# Patient Record
Sex: MALE | Race: BLACK OR AFRICAN AMERICAN | NOT HISPANIC OR LATINO
[De-identification: names, ages, dates, MRNs, and addresses within clinical notes are randomized per-mention and may not be internally consistent; named-entity substitution may affect disease eponyms.]

---

## 2017-01-23 ENCOUNTER — APPOINTMENT (OUTPATIENT)
Dept: UROLOGY | Facility: CLINIC | Age: 72
End: 2017-01-23

## 2017-01-23 VITALS
DIASTOLIC BLOOD PRESSURE: 83 MMHG | OXYGEN SATURATION: 99 % | HEIGHT: 62 IN | BODY MASS INDEX: 34.04 KG/M2 | WEIGHT: 185 LBS | HEART RATE: 42 BPM | SYSTOLIC BLOOD PRESSURE: 137 MMHG | TEMPERATURE: 98.1 F

## 2017-01-31 ENCOUNTER — FORM ENCOUNTER (OUTPATIENT)
Age: 72
End: 2017-01-31

## 2017-02-01 ENCOUNTER — OUTPATIENT (OUTPATIENT)
Dept: OUTPATIENT SERVICES | Facility: HOSPITAL | Age: 72
LOS: 1 days | End: 2017-02-01
Payer: MEDICARE

## 2017-02-01 PROCEDURE — 71260 CT THORAX DX C+: CPT

## 2017-02-01 PROCEDURE — 71260 CT THORAX DX C+: CPT | Mod: 26

## 2017-02-22 ENCOUNTER — APPOINTMENT (OUTPATIENT)
Dept: UROLOGY | Facility: CLINIC | Age: 72
End: 2017-02-22

## 2017-02-22 VITALS
SYSTOLIC BLOOD PRESSURE: 144 MMHG | TEMPERATURE: 97.7 F | HEART RATE: 51 BPM | WEIGHT: 181 LBS | RESPIRATION RATE: 99 BRPM | DIASTOLIC BLOOD PRESSURE: 76 MMHG | BODY MASS INDEX: 23.23 KG/M2 | HEIGHT: 74 IN

## 2017-02-23 ENCOUNTER — OTHER (OUTPATIENT)
Age: 72
End: 2017-02-23

## 2017-02-23 LAB
ALBUMIN SERPL ELPH-MCNC: 4.4 G/DL
ALP BLD-CCNC: 127 U/L
ALT SERPL-CCNC: 61 U/L
ANION GAP SERPL CALC-SCNC: 14 MMOL/L
AST SERPL-CCNC: 64 U/L
BASOPHILS # BLD AUTO: 0.03 K/UL
BASOPHILS NFR BLD AUTO: 0.4 %
BILIRUB SERPL-MCNC: 0.4 MG/DL
BUN SERPL-MCNC: 14 MG/DL
CALCIUM SERPL-MCNC: 9.3 MG/DL
CHLORIDE SERPL-SCNC: 101 MMOL/L
CO2 SERPL-SCNC: 26 MMOL/L
CREAT SERPL-MCNC: 1.22 MG/DL
EOSINOPHIL # BLD AUTO: 0.13 K/UL
EOSINOPHIL NFR BLD AUTO: 1.9 %
GLUCOSE SERPL-MCNC: 100 MG/DL
HCT VFR BLD CALC: 39 %
HGB BLD-MCNC: 12.7 G/DL
IMM GRANULOCYTES NFR BLD AUTO: 0.3 %
LYMPHOCYTES # BLD AUTO: 1.64 K/UL
LYMPHOCYTES NFR BLD AUTO: 24.2 %
MAN DIFF?: NORMAL
MCHC RBC-ENTMCNC: 32.3 PG
MCHC RBC-ENTMCNC: 32.6 GM/DL
MCV RBC AUTO: 99.2 FL
MONOCYTES # BLD AUTO: 0.69 K/UL
MONOCYTES NFR BLD AUTO: 10.2 %
NEUTROPHILS # BLD AUTO: 4.26 K/UL
NEUTROPHILS NFR BLD AUTO: 63 %
PLATELET # BLD AUTO: 201 K/UL
POTASSIUM SERPL-SCNC: 5 MMOL/L
PROT SERPL-MCNC: 7.8 G/DL
PSA SERPL-MCNC: 28.64 NG/ML
RBC # BLD: 3.93 M/UL
RBC # FLD: 12.9 %
SODIUM SERPL-SCNC: 141 MMOL/L
TESTOST SERPL-MCNC: 372.9 NG/DL
WBC # FLD AUTO: 6.77 K/UL

## 2017-03-10 ENCOUNTER — OTHER (OUTPATIENT)
Age: 72
End: 2017-03-10

## 2017-03-16 ENCOUNTER — APPOINTMENT (OUTPATIENT)
Dept: THORACIC SURGERY | Facility: CLINIC | Age: 72
End: 2017-03-16

## 2017-03-16 VITALS
RESPIRATION RATE: 17 BRPM | HEIGHT: 74 IN | HEART RATE: 57 BPM | DIASTOLIC BLOOD PRESSURE: 60 MMHG | WEIGHT: 184 LBS | SYSTOLIC BLOOD PRESSURE: 113 MMHG | TEMPERATURE: 98 F | BODY MASS INDEX: 23.61 KG/M2 | OXYGEN SATURATION: 99 %

## 2017-03-16 DIAGNOSIS — R06.02 SHORTNESS OF BREATH: ICD-10-CM

## 2017-03-16 DIAGNOSIS — Z87.09 PERSONAL HISTORY OF OTHER DISEASES OF THE RESPIRATORY SYSTEM: ICD-10-CM

## 2017-03-16 DIAGNOSIS — Z87.891 PERSONAL HISTORY OF NICOTINE DEPENDENCE: ICD-10-CM

## 2017-03-16 DIAGNOSIS — Z87.81 PERSONAL HISTORY OF (HEALED) TRAUMATIC FRACTURE: ICD-10-CM

## 2017-03-16 DIAGNOSIS — Z80.9 FAMILY HISTORY OF MALIGNANT NEOPLASM, UNSPECIFIED: ICD-10-CM

## 2017-03-16 DIAGNOSIS — Z86.79 PERSONAL HISTORY OF OTHER DISEASES OF THE CIRCULATORY SYSTEM: ICD-10-CM

## 2017-03-16 DIAGNOSIS — R51 HEADACHE: ICD-10-CM

## 2017-03-16 DIAGNOSIS — R40.20 UNSPECIFIED COMA: ICD-10-CM

## 2017-04-03 DIAGNOSIS — I77.819 AORTIC ECTASIA, UNSPECIFIED SITE: ICD-10-CM

## 2017-04-05 ENCOUNTER — APPOINTMENT (OUTPATIENT)
Dept: THORACIC SURGERY | Facility: CLINIC | Age: 72
End: 2017-04-05

## 2017-04-05 ENCOUNTER — APPOINTMENT (OUTPATIENT)
Dept: CARDIOTHORACIC SURGERY | Facility: CLINIC | Age: 72
End: 2017-04-05

## 2017-04-05 ENCOUNTER — OUTPATIENT (OUTPATIENT)
Dept: OUTPATIENT SERVICES | Facility: HOSPITAL | Age: 72
LOS: 1 days | End: 2017-04-05
Payer: MEDICARE

## 2017-04-05 VITALS
HEART RATE: 70 BPM | OXYGEN SATURATION: 99 % | TEMPERATURE: 97.5 F | DIASTOLIC BLOOD PRESSURE: 71 MMHG | BODY MASS INDEX: 24.14 KG/M2 | SYSTOLIC BLOOD PRESSURE: 139 MMHG | RESPIRATION RATE: 18 BRPM | WEIGHT: 188 LBS

## 2017-04-05 VITALS — BODY MASS INDEX: 24.14 KG/M2 | HEIGHT: 74 IN

## 2017-04-05 DIAGNOSIS — R59.9 ENLARGED LYMPH NODES, UNSPECIFIED: ICD-10-CM

## 2017-04-05 LAB
ALBUMIN SERPL ELPH-MCNC: 4.1 G/DL — SIGNIFICANT CHANGE UP (ref 3.4–5)
ALP SERPL-CCNC: 175 U/L — HIGH (ref 40–120)
ALT FLD-CCNC: 56 U/L — HIGH (ref 12–42)
ANION GAP SERPL CALC-SCNC: 4 MMOL/L — LOW (ref 9–16)
APTT BLD: 29.3 SEC — SIGNIFICANT CHANGE UP (ref 27.5–37.4)
AST SERPL-CCNC: 45 U/L — HIGH (ref 15–37)
BASOPHILS NFR BLD AUTO: 0.4 % — SIGNIFICANT CHANGE UP (ref 0–2)
BILIRUB SERPL-MCNC: 0.6 MG/DL — SIGNIFICANT CHANGE UP (ref 0.2–1.2)
BUN SERPL-MCNC: 15 MG/DL — SIGNIFICANT CHANGE UP (ref 7–23)
CALCIUM SERPL-MCNC: 9 MG/DL — SIGNIFICANT CHANGE UP (ref 8.5–10.5)
CHLORIDE SERPL-SCNC: 103 MMOL/L — SIGNIFICANT CHANGE UP (ref 96–108)
CO2 SERPL-SCNC: 33 MMOL/L — HIGH (ref 22–31)
CREAT SERPL-MCNC: 1.07 MG/DL — SIGNIFICANT CHANGE UP (ref 0.5–1.3)
EOSINOPHIL NFR BLD AUTO: 2.3 % — SIGNIFICANT CHANGE UP (ref 0–6)
GLUCOSE SERPL-MCNC: 106 MG/DL — HIGH (ref 70–99)
HCT VFR BLD CALC: 36.9 % — LOW (ref 39–50)
HGB BLD-MCNC: 12.8 G/DL — LOW (ref 13–17)
INR BLD: 1.1 — SIGNIFICANT CHANGE UP (ref 0.88–1.16)
LYMPHOCYTES # BLD AUTO: 26.7 % — SIGNIFICANT CHANGE UP (ref 13–44)
MCHC RBC-ENTMCNC: 32.8 PG — SIGNIFICANT CHANGE UP (ref 27–34)
MCHC RBC-ENTMCNC: 34.7 G/DL — SIGNIFICANT CHANGE UP (ref 32–36)
MCV RBC AUTO: 94.6 FL — SIGNIFICANT CHANGE UP (ref 80–100)
MONOCYTES NFR BLD AUTO: 9.2 % — SIGNIFICANT CHANGE UP (ref 2–14)
NEUTROPHILS NFR BLD AUTO: 61.4 % — SIGNIFICANT CHANGE UP (ref 43–77)
PLATELET # BLD AUTO: 188 K/UL — SIGNIFICANT CHANGE UP (ref 150–400)
POTASSIUM SERPL-MCNC: 4.8 MMOL/L — SIGNIFICANT CHANGE UP (ref 3.5–5.3)
POTASSIUM SERPL-SCNC: 4.8 MMOL/L — SIGNIFICANT CHANGE UP (ref 3.5–5.3)
PROT SERPL-MCNC: 8.2 G/DL — SIGNIFICANT CHANGE UP (ref 6.4–8.2)
PROTHROM AB SERPL-ACNC: 12.2 SEC — SIGNIFICANT CHANGE UP (ref 9.8–12.7)
RBC # BLD: 3.9 M/UL — LOW (ref 4.2–5.8)
RBC # FLD: 12.6 % — SIGNIFICANT CHANGE UP (ref 10.3–16.9)
SODIUM SERPL-SCNC: 140 MMOL/L — SIGNIFICANT CHANGE UP (ref 135–145)
WBC # BLD: 7.5 K/UL — SIGNIFICANT CHANGE UP (ref 3.8–10.5)
WBC # FLD AUTO: 7.5 K/UL — SIGNIFICANT CHANGE UP (ref 3.8–10.5)

## 2017-04-05 PROCEDURE — 85730 THROMBOPLASTIN TIME PARTIAL: CPT

## 2017-04-05 PROCEDURE — 80053 COMPREHEN METABOLIC PANEL: CPT

## 2017-04-05 PROCEDURE — 85025 COMPLETE CBC W/AUTO DIFF WBC: CPT

## 2017-04-05 PROCEDURE — 85610 PROTHROMBIN TIME: CPT

## 2017-04-05 RX ORDER — LISINOPRIL 10 MG/1
10 TABLET ORAL
Qty: 30 | Refills: 0 | Status: COMPLETED | COMMUNITY
Start: 2016-10-24

## 2017-04-11 ENCOUNTER — RESULT REVIEW (OUTPATIENT)
Age: 72
End: 2017-04-11

## 2017-04-12 ENCOUNTER — OUTPATIENT (OUTPATIENT)
Dept: OUTPATIENT SERVICES | Facility: HOSPITAL | Age: 72
LOS: 1 days | End: 2017-04-12
Payer: MEDICARE

## 2017-04-12 PROCEDURE — 10022: CPT

## 2017-04-12 PROCEDURE — 76942 ECHO GUIDE FOR BIOPSY: CPT | Mod: 26

## 2017-04-12 PROCEDURE — 76942 ECHO GUIDE FOR BIOPSY: CPT

## 2017-04-12 PROCEDURE — 88173 CYTOPATH EVAL FNA REPORT: CPT

## 2017-04-12 PROCEDURE — 88341 IMHCHEM/IMCYTCHM EA ADD ANTB: CPT

## 2017-04-12 PROCEDURE — 88305 TISSUE EXAM BY PATHOLOGIST: CPT

## 2017-04-13 LAB — NON-GYNECOLOGICAL CYTOLOGY STUDY: SIGNIFICANT CHANGE UP

## 2017-04-20 ENCOUNTER — RESULT CHARGE (OUTPATIENT)
Age: 72
End: 2017-04-20

## 2017-04-21 ENCOUNTER — OTHER (OUTPATIENT)
Age: 72
End: 2017-04-21

## 2017-04-21 ENCOUNTER — APPOINTMENT (OUTPATIENT)
Dept: THORACIC SURGERY | Facility: CLINIC | Age: 72
End: 2017-04-21

## 2017-04-27 ENCOUNTER — APPOINTMENT (OUTPATIENT)
Dept: PULMONOLOGY | Facility: CLINIC | Age: 72
End: 2017-04-27

## 2017-04-27 VITALS
SYSTOLIC BLOOD PRESSURE: 90 MMHG | WEIGHT: 188 LBS | HEART RATE: 54 BPM | OXYGEN SATURATION: 98 % | TEMPERATURE: 97.6 F | BODY MASS INDEX: 24.13 KG/M2 | DIASTOLIC BLOOD PRESSURE: 60 MMHG | HEIGHT: 74 IN

## 2017-04-27 DIAGNOSIS — R59.9 ENLARGED LYMPH NODES, UNSPECIFIED: ICD-10-CM

## 2017-04-28 ENCOUNTER — OTHER (OUTPATIENT)
Age: 72
End: 2017-04-28

## 2017-04-28 LAB
ANION GAP SERPL CALC-SCNC: 15 MMOL/L
BASOPHILS # BLD AUTO: 0.02 K/UL
BASOPHILS NFR BLD AUTO: 0.3 %
BUN SERPL-MCNC: 23 MG/DL
CALCIUM SERPL-MCNC: 9.2 MG/DL
CHLORIDE SERPL-SCNC: 99 MMOL/L
CO2 SERPL-SCNC: 25 MMOL/L
CREAT SERPL-MCNC: 1.28 MG/DL
EOSINOPHIL # BLD AUTO: 0.11 K/UL
EOSINOPHIL NFR BLD AUTO: 1.7 %
GLUCOSE SERPL-MCNC: 96 MG/DL
HCT VFR BLD CALC: 38.1 %
HGB BLD-MCNC: 12 G/DL
IMM GRANULOCYTES NFR BLD AUTO: 0.3 %
LYMPHOCYTES # BLD AUTO: 1.41 K/UL
LYMPHOCYTES NFR BLD AUTO: 21.9 %
MAN DIFF?: NORMAL
MCHC RBC-ENTMCNC: 31.5 GM/DL
MCHC RBC-ENTMCNC: 31.6 PG
MCV RBC AUTO: 100.3 FL
MONOCYTES # BLD AUTO: 0.42 K/UL
MONOCYTES NFR BLD AUTO: 6.5 %
NEUTROPHILS # BLD AUTO: 4.45 K/UL
NEUTROPHILS NFR BLD AUTO: 69.3 %
PLATELET # BLD AUTO: 201 K/UL
POTASSIUM SERPL-SCNC: 4 MMOL/L
RBC # BLD: 3.8 M/UL
RBC # FLD: 13.7 %
SODIUM SERPL-SCNC: 139 MMOL/L
WBC # FLD AUTO: 6.43 K/UL

## 2017-05-10 ENCOUNTER — OUTPATIENT (OUTPATIENT)
Dept: OUTPATIENT SERVICES | Facility: HOSPITAL | Age: 72
LOS: 1 days | End: 2017-05-10
Payer: MEDICARE

## 2017-05-10 DIAGNOSIS — I71.9 AORTIC ANEURYSM OF UNSPECIFIED SITE, WITHOUT RUPTURE: ICD-10-CM

## 2017-05-10 PROCEDURE — 93306 TTE W/DOPPLER COMPLETE: CPT | Mod: 26

## 2017-05-10 PROCEDURE — 93306 TTE W/DOPPLER COMPLETE: CPT

## 2017-05-16 ENCOUNTER — APPOINTMENT (OUTPATIENT)
Dept: PULMONOLOGY | Facility: HOSPITAL | Age: 72
End: 2017-05-16

## 2017-05-17 ENCOUNTER — APPOINTMENT (OUTPATIENT)
Dept: INTERNAL MEDICINE | Facility: CLINIC | Age: 72
End: 2017-05-17

## 2017-05-31 ENCOUNTER — OTHER (OUTPATIENT)
Age: 72
End: 2017-05-31

## 2017-06-05 ENCOUNTER — LABORATORY RESULT (OUTPATIENT)
Age: 72
End: 2017-06-05

## 2017-06-05 ENCOUNTER — APPOINTMENT (OUTPATIENT)
Dept: PULMONOLOGY | Facility: CLINIC | Age: 72
End: 2017-06-05

## 2017-06-05 VITALS
OXYGEN SATURATION: 99 % | HEART RATE: 62 BPM | DIASTOLIC BLOOD PRESSURE: 60 MMHG | BODY MASS INDEX: 23.61 KG/M2 | HEIGHT: 74 IN | SYSTOLIC BLOOD PRESSURE: 98 MMHG | TEMPERATURE: 97.9 F | WEIGHT: 184 LBS

## 2017-06-05 DIAGNOSIS — R91.1 SOLITARY PULMONARY NODULE: ICD-10-CM

## 2017-06-06 LAB
ANION GAP SERPL CALC-SCNC: 17 MMOL/L
BUN SERPL-MCNC: 16 MG/DL
CALCIUM SERPL-MCNC: 9.9 MG/DL
CHLORIDE SERPL-SCNC: 98 MMOL/L
CO2 SERPL-SCNC: 26 MMOL/L
CREAT SERPL-MCNC: 1.29 MG/DL
GLUCOSE SERPL-MCNC: 99 MG/DL
POTASSIUM SERPL-SCNC: 4.8 MMOL/L
SODIUM SERPL-SCNC: 141 MMOL/L

## 2017-06-07 ENCOUNTER — APPOINTMENT (OUTPATIENT)
Dept: UROLOGY | Facility: CLINIC | Age: 72
End: 2017-06-07

## 2017-06-07 VITALS
TEMPERATURE: 98.6 F | DIASTOLIC BLOOD PRESSURE: 71 MMHG | SYSTOLIC BLOOD PRESSURE: 104 MMHG | HEIGHT: 74 IN | HEART RATE: 77 BPM | WEIGHT: 184 LBS | BODY MASS INDEX: 23.61 KG/M2

## 2017-06-08 LAB
ALBUMIN SERPL ELPH-MCNC: 4.4 G/DL
ALP BLD-CCNC: 176 U/L
ALT SERPL-CCNC: 67 U/L
ANION GAP SERPL CALC-SCNC: 18 MMOL/L
AST SERPL-CCNC: 78 U/L
BILIRUB SERPL-MCNC: 0.3 MG/DL
BUN SERPL-MCNC: 13 MG/DL
CALCIUM SERPL-MCNC: 9.5 MG/DL
CHLORIDE SERPL-SCNC: 99 MMOL/L
CO2 SERPL-SCNC: 24 MMOL/L
CREAT SERPL-MCNC: 1.29 MG/DL
GLUCOSE SERPL-MCNC: 126 MG/DL
POTASSIUM SERPL-SCNC: 5 MMOL/L
PROT SERPL-MCNC: 8 G/DL
PSA SERPL-MCNC: 40.05 NG/ML
SODIUM SERPL-SCNC: 141 MMOL/L

## 2017-06-09 ENCOUNTER — RX RENEWAL (OUTPATIENT)
Age: 72
End: 2017-06-09

## 2017-06-09 LAB — TESTOST SERPL-MCNC: 532.6 NG/DL

## 2017-06-13 ENCOUNTER — APPOINTMENT (OUTPATIENT)
Dept: PULMONOLOGY | Facility: HOSPITAL | Age: 72
End: 2017-06-13

## 2017-06-15 ENCOUNTER — FORM ENCOUNTER (OUTPATIENT)
Age: 72
End: 2017-06-15

## 2017-06-16 ENCOUNTER — OUTPATIENT (OUTPATIENT)
Dept: OUTPATIENT SERVICES | Facility: HOSPITAL | Age: 72
LOS: 1 days | End: 2017-06-16
Payer: MEDICARE

## 2017-06-16 PROCEDURE — 71250 CT THORAX DX C-: CPT

## 2017-06-16 PROCEDURE — 71250 CT THORAX DX C-: CPT | Mod: 26

## 2017-06-20 ENCOUNTER — FORM ENCOUNTER (OUTPATIENT)
Age: 72
End: 2017-06-20

## 2017-06-21 ENCOUNTER — OUTPATIENT (OUTPATIENT)
Dept: OUTPATIENT SERVICES | Facility: HOSPITAL | Age: 72
LOS: 1 days | End: 2017-06-21
Payer: MEDICARE

## 2017-06-21 PROCEDURE — 71260 CT THORAX DX C+: CPT

## 2017-06-21 PROCEDURE — 71260 CT THORAX DX C+: CPT | Mod: 26

## 2017-06-21 PROCEDURE — 74177 CT ABD & PELVIS W/CONTRAST: CPT

## 2017-06-21 PROCEDURE — 74177 CT ABD & PELVIS W/CONTRAST: CPT | Mod: 26

## 2017-06-22 ENCOUNTER — FORM ENCOUNTER (OUTPATIENT)
Age: 72
End: 2017-06-22

## 2017-06-23 ENCOUNTER — RESULT REVIEW (OUTPATIENT)
Age: 72
End: 2017-06-23

## 2017-06-23 ENCOUNTER — APPOINTMENT (OUTPATIENT)
Dept: PULMONOLOGY | Facility: HOSPITAL | Age: 72
End: 2017-06-23

## 2017-06-23 ENCOUNTER — OUTPATIENT (OUTPATIENT)
Dept: OUTPATIENT SERVICES | Facility: HOSPITAL | Age: 72
LOS: 1 days | Discharge: HOME | End: 2017-06-23
Payer: MEDICARE

## 2017-06-23 PROCEDURE — 88173 CYTOPATH EVAL FNA REPORT: CPT

## 2017-06-23 PROCEDURE — 87305 ASPERGILLUS AG IA: CPT

## 2017-06-23 PROCEDURE — 31652 BRONCH EBUS SAMPLNG 1/2 NODE: CPT

## 2017-06-23 PROCEDURE — 31622 DX BRONCHOSCOPE/WASH: CPT | Mod: 59

## 2017-06-23 PROCEDURE — 87070 CULTURE OTHR SPECIMN AEROBIC: CPT

## 2017-06-23 PROCEDURE — 87102 FUNGUS ISOLATION CULTURE: CPT

## 2017-06-23 PROCEDURE — 31623 DX BRONCHOSCOPE/BRUSH: CPT

## 2017-06-23 PROCEDURE — 87449 NOS EACH ORGANISM AG IA: CPT

## 2017-06-23 PROCEDURE — 71045 X-RAY EXAM CHEST 1 VIEW: CPT

## 2017-06-23 PROCEDURE — 88342 IMHCHEM/IMCYTCHM 1ST ANTB: CPT

## 2017-06-23 PROCEDURE — 71010: CPT | Mod: 26

## 2017-06-23 PROCEDURE — 87206 SMEAR FLUORESCENT/ACID STAI: CPT

## 2017-06-23 PROCEDURE — 31624 DX BRONCHOSCOPE/LAVAGE: CPT

## 2017-06-23 PROCEDURE — 31627 NAVIGATIONAL BRONCHOSCOPY: CPT

## 2017-06-23 PROCEDURE — 31628 BRONCHOSCOPY/LUNG BX EACH: CPT

## 2017-06-23 PROCEDURE — 88305 TISSUE EXAM BY PATHOLOGIST: CPT

## 2017-06-23 PROCEDURE — 31654 BRONCH EBUS IVNTJ PERPH LES: CPT

## 2017-06-23 PROCEDURE — 87015 SPECIMEN INFECT AGNT CONCNTJ: CPT

## 2017-06-23 PROCEDURE — 88341 IMHCHEM/IMCYTCHM EA ADD ANTB: CPT

## 2017-06-23 PROCEDURE — 87116 MYCOBACTERIA CULTURE: CPT

## 2017-06-24 LAB
GRAM STN FLD: SIGNIFICANT CHANGE UP
NIGHT BLUE STAIN TISS: SIGNIFICANT CHANGE UP
SPECIMEN SOURCE: SIGNIFICANT CHANGE UP
SPECIMEN SOURCE: SIGNIFICANT CHANGE UP

## 2017-06-26 LAB
CULTURE RESULTS: SIGNIFICANT CHANGE UP
GALACTOMANNAN AG SPEC IA-ACNC: <0.5 INDEX — SIGNIFICANT CHANGE UP
NON-GYNECOLOGICAL CYTOLOGY STUDY: SIGNIFICANT CHANGE UP
NON-GYNECOLOGICAL CYTOLOGY STUDY: SIGNIFICANT CHANGE UP
SPECIMEN SOURCE: SIGNIFICANT CHANGE UP

## 2017-06-28 ENCOUNTER — APPOINTMENT (OUTPATIENT)
Dept: UROLOGY | Facility: CLINIC | Age: 72
End: 2017-06-28

## 2017-06-28 VITALS
HEIGHT: 74 IN | WEIGHT: 184 LBS | DIASTOLIC BLOOD PRESSURE: 75 MMHG | BODY MASS INDEX: 23.61 KG/M2 | SYSTOLIC BLOOD PRESSURE: 117 MMHG | TEMPERATURE: 98.4 F | HEART RATE: 60 BPM

## 2017-06-28 DIAGNOSIS — Z85.46 PERSONAL HISTORY OF MALIGNANT NEOPLASM OF PROSTATE: ICD-10-CM

## 2017-06-28 DIAGNOSIS — R91.1 SOLITARY PULMONARY NODULE: ICD-10-CM

## 2017-06-28 DIAGNOSIS — C77.1 SECONDARY AND UNSPECIFIED MALIGNANT NEOPLASM OF INTRATHORACIC LYMPH NODES: ICD-10-CM

## 2017-06-29 ENCOUNTER — OTHER (OUTPATIENT)
Age: 72
End: 2017-06-29

## 2017-06-29 LAB
FUNGITELL B-D-GLUCAN,  BRONCHIAL WASH: SIGNIFICANT CHANGE UP
PSA SERPL-MCNC: 5.03 NG/ML
SURGICAL PATHOLOGY STUDY: SIGNIFICANT CHANGE UP
TESTOST SERPL-MCNC: 477 NG/DL

## 2017-07-05 ENCOUNTER — APPOINTMENT (OUTPATIENT)
Dept: PULMONOLOGY | Facility: CLINIC | Age: 72
End: 2017-07-05

## 2017-07-05 ENCOUNTER — APPOINTMENT (OUTPATIENT)
Dept: PULMONOLOGY | Facility: HOSPITAL | Age: 72
End: 2017-07-05

## 2017-07-12 ENCOUNTER — APPOINTMENT (OUTPATIENT)
Dept: CARDIOTHORACIC SURGERY | Facility: CLINIC | Age: 72
End: 2017-07-12

## 2017-07-12 VITALS
TEMPERATURE: 97.7 F | BODY MASS INDEX: 24.39 KG/M2 | HEART RATE: 57 BPM | SYSTOLIC BLOOD PRESSURE: 115 MMHG | OXYGEN SATURATION: 99 % | DIASTOLIC BLOOD PRESSURE: 67 MMHG | RESPIRATION RATE: 17 BRPM | WEIGHT: 190 LBS

## 2017-07-12 VITALS
DIASTOLIC BLOOD PRESSURE: 67 MMHG | TEMPERATURE: 97.7 F | SYSTOLIC BLOOD PRESSURE: 115 MMHG | RESPIRATION RATE: 17 BRPM | HEIGHT: 74 IN | OXYGEN SATURATION: 99 % | WEIGHT: 190 LBS | HEART RATE: 57 BPM | BODY MASS INDEX: 24.38 KG/M2

## 2017-07-14 VITALS
SYSTOLIC BLOOD PRESSURE: 96 MMHG | RESPIRATION RATE: 17 BRPM | DIASTOLIC BLOOD PRESSURE: 54 MMHG | TEMPERATURE: 96 F | OXYGEN SATURATION: 99 % | HEART RATE: 46 BPM

## 2017-07-14 RX ORDER — CHLORHEXIDINE GLUCONATE 213 G/1000ML
1 SOLUTION TOPICAL ONCE
Qty: 0 | Refills: 0 | Status: DISCONTINUED | OUTPATIENT
Start: 2017-07-17 | End: 2017-07-17

## 2017-07-14 NOTE — H&P ADULT - ASSESSMENT
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 Recent repeat CT scan (6/21/2017) showed aortic root measuring 5.6x 5.2cm with aneurysmal dilatation of ascending aorta. Patient admits to SOB when walking too fast up a hill, but used to run for miles before he found out he had a problem with his aorta. 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 Recent repeat CT scan (6/21/2017) showed aortic root measuring 5.6x 5.2cm with aneurysmal dilatation of ascending aorta. Patient admits to SOB when walking too fast up a hill, but used to run for miles before he found out he had a problem with his aorta. In light of patient's risk factors, CT findings, and echocardiogram findings, patient is referred for right and left heart catheterization to r/o CAD in preparation for upcoming surgery. Patient does not yet have a scheduled surgery date.     Patient took ASA 81 mg this AM prior to arrival. As per Dr. Baldwin, patient loaded with  mg and Plavix 600 mg prior to procedure.     ASA III and Mallampati III  Risks & benefits of procedure and alternative therapy have been explained to the patient including but not limited to: allergic reaction, bleeding w/possible need for blood transfusion, infection, renal and vascular compromise, limb damage, arrhythmia, stroke, vessel dissection/perforation, Myocardial infarction, emergent CABG. Informed consent obtained and in chart.

## 2017-07-14 NOTE — H&P ADULT - NSHPSOCIALHISTORY_GEN_ALL_CORE
ETOH-used to occasionally drink, TOBACCO-quit 2006 used to smoke on and off since college, DRUGS-cocaine/marjuana in univeristy, but none recently. ETOH-used to occasionally drink, TOBACCO-quit 2006 used to smoke on and off since college, DRUGS-cocaine/marjuana in university, but none recently.

## 2017-07-14 NOTE — H&P ADULT - HISTORY OF PRESENT ILLNESS
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 Recent repeat CT scan (6/21/2017) showed aortic root measuring 5.6x 5.2cm with aneurysmal dilatation of ascending aorta.       Of note, PET CT scan revealed multiple FDG avid lymph nodes and the left supraclavicular node was biopsied. Pt follows with Dr Avalos (thoracic surgeon).     Echo (5/10/17): EF 40%, moderate concentric LVH, mild global hypokinesis of LV, impaired left ventricular relaxation with mildly elevated LA pressure, mild-moderate AR, PA pressure 25-30mmHg, dilated aortic root. 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 Recent repeat CT scan (6/21/2017) showed aortic root measuring 5.6x 5.2cm with aneurysmal dilatation of ascending aorta. Patient admits to SOB when walking too fast up a hill, but used to run for miles before he found out he had a problem with his aorta.     Echo (5/10/17): EF 40%, moderate concentric LVH, mild global hypokinesis of LV, impaired left ventricular relaxation with mildly elevated LA pressure, mild-moderate AR, PA pressure 25-30mmHg, dilated aortic root.      Of note, PET CT scan revealed multiple FDG avid lymph nodes and the left supraclavicular node was biopsied. Pt follows with Dr Avalos (thoracic surgeon). 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 Recent repeat CT scan (6/21/2017) showed aortic root measuring 5.6x 5.2cm with aneurysmal dilatation of ascending aorta. Patient admits to SOB when walking too fast up a hill, but used to run for miles before he found out he had a problem with his aorta.     Echo (5/10/17): EF 40%, moderate concentric LVH, mild global hypokinesis of LV, impaired left ventricular relaxation with mildly elevated LA pressure, mild-moderate AR, PA pressure 25-30mmHg, dilated aortic root.      Of note, PET CT scan revealed multiple FDG avid lymph nodes and the left supraclavicular node was biopsied. Pt follows with Dr Avalos (thoracic surgeon). 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 Recent repeat CT scan (6/21/2017) showed aortic root measuring 5.6x 5.2cm with aneurysmal dilatation of ascending aorta. Patient admits to SOB when walking too fast up a hill, but used to run for miles before he found out he had a problem with his aorta.     Echo (5/10/17): EF 40%, moderate concentric LVH, mild global hypokinesis of LV, impaired left ventricular relaxation with mildly elevated LA pressure, mild-moderate AR, PA pressure 25-30mmHg, dilated aortic root.      Of note, PET CT scan revealed multiple FDG avid lymph nodes and the left supraclavicular node was biopsied. Pt follows with Dr Avalos (thoracic surgeon). Follows with his Urologist for his cancer who referred him to an oncologist at [facility] who did not accept his insurance, currently looking for an oncologist

## 2017-07-16 ENCOUNTER — FORM ENCOUNTER (OUTPATIENT)
Age: 72
End: 2017-07-16

## 2017-07-17 ENCOUNTER — OUTPATIENT (OUTPATIENT)
Dept: OUTPATIENT SERVICES | Facility: HOSPITAL | Age: 72
LOS: 1 days | Discharge: NURSING, REHAB OR LONG-TERM CARE | End: 2017-07-17
Payer: MEDICARE

## 2017-07-17 DIAGNOSIS — S42.009A FRACTURE OF UNSPECIFIED PART OF UNSPECIFIED CLAVICLE, INITIAL ENCOUNTER FOR CLOSED FRACTURE: Chronic | ICD-10-CM

## 2017-07-17 LAB
ALBUMIN SERPL ELPH-MCNC: 4.1 G/DL — SIGNIFICANT CHANGE UP (ref 3.3–5)
ALP SERPL-CCNC: 151 U/L — HIGH (ref 40–120)
ALT FLD-CCNC: 38 U/L — SIGNIFICANT CHANGE UP (ref 10–45)
ANION GAP SERPL CALC-SCNC: 15 MMOL/L — SIGNIFICANT CHANGE UP (ref 5–17)
APPEARANCE UR: CLEAR — SIGNIFICANT CHANGE UP
APTT BLD: 26.7 SEC — LOW (ref 27.5–37.4)
AST SERPL-CCNC: 51 U/L — HIGH (ref 10–40)
BASOPHILS NFR BLD AUTO: 0.3 % — SIGNIFICANT CHANGE UP (ref 0–2)
BILIRUB SERPL-MCNC: 0.4 MG/DL — SIGNIFICANT CHANGE UP (ref 0.2–1.2)
BILIRUB UR-MCNC: NEGATIVE — SIGNIFICANT CHANGE UP
BLD GP AB SCN SERPL QL: NEGATIVE — SIGNIFICANT CHANGE UP
BUN SERPL-MCNC: 16 MG/DL — SIGNIFICANT CHANGE UP (ref 7–23)
CALCIUM SERPL-MCNC: 8.9 MG/DL — SIGNIFICANT CHANGE UP (ref 8.4–10.5)
CHLORIDE SERPL-SCNC: 103 MMOL/L — SIGNIFICANT CHANGE UP (ref 96–108)
CHOLEST SERPL-MCNC: 163 MG/DL — SIGNIFICANT CHANGE UP (ref 10–199)
CO2 SERPL-SCNC: 21 MMOL/L — LOW (ref 22–31)
COLOR SPEC: YELLOW — SIGNIFICANT CHANGE UP
CREAT SERPL-MCNC: 1.2 MG/DL — SIGNIFICANT CHANGE UP (ref 0.5–1.3)
DIFF PNL FLD: NEGATIVE — SIGNIFICANT CHANGE UP
EOSINOPHIL NFR BLD AUTO: 2.3 % — SIGNIFICANT CHANGE UP (ref 0–6)
GLUCOSE SERPL-MCNC: 136 MG/DL — HIGH (ref 70–99)
GLUCOSE UR QL: NEGATIVE — SIGNIFICANT CHANGE UP
HBA1C BLD-MCNC: 5.7 % — HIGH (ref 4–5.6)
HCT VFR BLD CALC: 37.5 % — LOW (ref 39–50)
HDLC SERPL-MCNC: 78 MG/DL — SIGNIFICANT CHANGE UP (ref 40–125)
HGB BLD-MCNC: 13.2 G/DL — SIGNIFICANT CHANGE UP (ref 13–17)
INR BLD: 1.06 — SIGNIFICANT CHANGE UP (ref 0.88–1.16)
KETONES UR-MCNC: NEGATIVE — SIGNIFICANT CHANGE UP
LEUKOCYTE ESTERASE UR-ACNC: NEGATIVE — SIGNIFICANT CHANGE UP
LIPID PNL WITH DIRECT LDL SERPL: 68 MG/DL — SIGNIFICANT CHANGE UP
LYMPHOCYTES # BLD AUTO: 27.9 % — SIGNIFICANT CHANGE UP (ref 13–44)
MCHC RBC-ENTMCNC: 32.7 PG — SIGNIFICANT CHANGE UP (ref 27–34)
MCHC RBC-ENTMCNC: 35.2 G/DL — SIGNIFICANT CHANGE UP (ref 32–36)
MCV RBC AUTO: 92.8 FL — SIGNIFICANT CHANGE UP (ref 80–100)
MONOCYTES NFR BLD AUTO: 6.8 % — SIGNIFICANT CHANGE UP (ref 2–14)
NEUTROPHILS NFR BLD AUTO: 62.7 % — SIGNIFICANT CHANGE UP (ref 43–77)
NITRITE UR-MCNC: NEGATIVE — SIGNIFICANT CHANGE UP
PH UR: 6 — SIGNIFICANT CHANGE UP (ref 5–8)
PLATELET # BLD AUTO: 185 K/UL — SIGNIFICANT CHANGE UP (ref 150–400)
POTASSIUM SERPL-MCNC: 3.9 MMOL/L — SIGNIFICANT CHANGE UP (ref 3.5–5.3)
POTASSIUM SERPL-SCNC: 3.9 MMOL/L — SIGNIFICANT CHANGE UP (ref 3.5–5.3)
PROT SERPL-MCNC: 8.2 G/DL — SIGNIFICANT CHANGE UP (ref 6–8.3)
PROT UR-MCNC: (no result) MG/DL
PROTHROM AB SERPL-ACNC: 11.8 SEC — SIGNIFICANT CHANGE UP (ref 9.8–12.7)
RBC # BLD: 4.04 M/UL — LOW (ref 4.2–5.8)
RBC # FLD: 13 % — SIGNIFICANT CHANGE UP (ref 10.3–16.9)
RH IG SCN BLD-IMP: POSITIVE — SIGNIFICANT CHANGE UP
SODIUM SERPL-SCNC: 139 MMOL/L — SIGNIFICANT CHANGE UP (ref 135–145)
SP GR SPEC: 1.02 — SIGNIFICANT CHANGE UP (ref 1–1.03)
TOTAL CHOLESTEROL/HDL RATIO MEASUREMENT: 2.1 RATIO — LOW (ref 3.4–9.6)
TRIGL SERPL-MCNC: 83 MG/DL — SIGNIFICANT CHANGE UP (ref 10–149)
TSH SERPL-MCNC: 2.87 UIU/ML — SIGNIFICANT CHANGE UP (ref 0.35–4.94)
UROBILINOGEN FLD QL: 0.2 E.U./DL — SIGNIFICANT CHANGE UP
WBC # BLD: 6.2 K/UL — SIGNIFICANT CHANGE UP (ref 3.8–10.5)
WBC # FLD AUTO: 6.2 K/UL — SIGNIFICANT CHANGE UP (ref 3.8–10.5)

## 2017-07-17 PROCEDURE — 86850 RBC ANTIBODY SCREEN: CPT

## 2017-07-17 PROCEDURE — 93567 NJX CAR CTH SPRVLV AORTGRPHY: CPT

## 2017-07-17 PROCEDURE — 93460 R&L HRT ART/VENTRICLE ANGIO: CPT

## 2017-07-17 PROCEDURE — 82248 BILIRUBIN DIRECT: CPT

## 2017-07-17 PROCEDURE — 80053 COMPREHEN METABOLIC PANEL: CPT

## 2017-07-17 PROCEDURE — C1889: CPT

## 2017-07-17 PROCEDURE — 93880 EXTRACRANIAL BILAT STUDY: CPT

## 2017-07-17 PROCEDURE — 93010 ELECTROCARDIOGRAM REPORT: CPT

## 2017-07-17 PROCEDURE — 83036 HEMOGLOBIN GLYCOSYLATED A1C: CPT

## 2017-07-17 PROCEDURE — 85025 COMPLETE CBC W/AUTO DIFF WBC: CPT

## 2017-07-17 PROCEDURE — 80061 LIPID PANEL: CPT

## 2017-07-17 PROCEDURE — 85610 PROTHROMBIN TIME: CPT

## 2017-07-17 PROCEDURE — 81001 URINALYSIS AUTO W/SCOPE: CPT

## 2017-07-17 PROCEDURE — 94150 VITAL CAPACITY TEST: CPT

## 2017-07-17 PROCEDURE — 94010 BREATHING CAPACITY TEST: CPT | Mod: 26

## 2017-07-17 PROCEDURE — C1887: CPT

## 2017-07-17 PROCEDURE — 85730 THROMBOPLASTIN TIME PARTIAL: CPT

## 2017-07-17 PROCEDURE — 71010: CPT | Mod: 26

## 2017-07-17 PROCEDURE — 93005 ELECTROCARDIOGRAM TRACING: CPT

## 2017-07-17 PROCEDURE — C1894: CPT

## 2017-07-17 PROCEDURE — 86900 BLOOD TYPING SEROLOGIC ABO: CPT

## 2017-07-17 PROCEDURE — 93880 EXTRACRANIAL BILAT STUDY: CPT | Mod: 26

## 2017-07-17 PROCEDURE — 93460 R&L HRT ART/VENTRICLE ANGIO: CPT | Mod: 26

## 2017-07-17 PROCEDURE — 71045 X-RAY EXAM CHEST 1 VIEW: CPT

## 2017-07-17 PROCEDURE — 86901 BLOOD TYPING SEROLOGIC RH(D): CPT

## 2017-07-17 PROCEDURE — C1769: CPT

## 2017-07-17 PROCEDURE — 84443 ASSAY THYROID STIM HORMONE: CPT

## 2017-07-17 RX ORDER — CLOPIDOGREL BISULFATE 75 MG/1
600 TABLET, FILM COATED ORAL ONCE
Qty: 0 | Refills: 0 | Status: COMPLETED | OUTPATIENT
Start: 2017-07-17 | End: 2017-07-17

## 2017-07-17 RX ORDER — ASPIRIN/CALCIUM CARB/MAGNESIUM 324 MG
243 TABLET ORAL ONCE
Qty: 0 | Refills: 0 | Status: COMPLETED | OUTPATIENT
Start: 2017-07-17 | End: 2017-07-17

## 2017-07-17 RX ADMIN — CLOPIDOGREL BISULFATE 600 MILLIGRAM(S): 75 TABLET, FILM COATED ORAL at 09:53

## 2017-07-17 RX ADMIN — Medication 243 MILLIGRAM(S): at 09:55

## 2017-07-17 NOTE — PROGRESS NOTE ADULT - SUBJECTIVE AND OBJECTIVE BOX
Interventional Cardiology PA SDA Discharge Note    Patient without complaints. 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 Recent repeat CT scan (6/21/2017) showed aortic root measuring 5.6x 5.2cm with aneurysmal dilatation of ascending aorta. Patient admits to SOB when walking too fast up a hill, but used to run for miles before he found out he had a problem with his aorta. In light of patient's risk factors, CT findings, and echocardiogram findings, patient is referred for right and left heart catheterization to r/o CAD in preparation for upcoming surgery. Patient does not yet have a scheduled surgery date. Patient took ASA 81 mg this AM prior to arrival. As per Dr. Baldwin, patient loaded with  mg and Plavix 600 mg prior to procedure. Cardiac Cath 7/17/17: R heart pressures normal(RV: 23/1, RA 4, PA 25/6, PCW 5, PA Sat 69%), LHC with normal coronaries, 3+ AI with root dilatation. EF 50%, EDP 6.                 1.	Stable for discharge as per attending Dr. Baldwin after bed rest, pt voids, wrist stable and 30 minutes of ambulation and all pre-op testing complete  2.	Follow-up with PMD/Cardiologist Dr. Fletcher for surgery  3.	Discharged forms signed and copies in chart

## 2017-07-19 ENCOUNTER — APPOINTMENT (OUTPATIENT)
Dept: UROLOGY | Facility: CLINIC | Age: 72
End: 2017-07-19

## 2017-07-19 VITALS
BODY MASS INDEX: 24.38 KG/M2 | DIASTOLIC BLOOD PRESSURE: 70 MMHG | TEMPERATURE: 98.5 F | SYSTOLIC BLOOD PRESSURE: 115 MMHG | WEIGHT: 190 LBS | HEIGHT: 74 IN | HEART RATE: 64 BPM

## 2017-07-20 LAB
PSA SERPL-MCNC: 1.35 NG/ML
TESTOST SERPL-MCNC: 568.7 NG/DL

## 2017-07-20 NOTE — H&P ADULT - ASSESSMENT
71 year old male, former smoker, with pmHx of HTN, Systolic CHF (EF 40%), MI @ [place] in 2016 (tx medically), questionable Afib (on aspirin only per patient), recurrent prostate cancer with metastasis to the vertebrae and lungs (dx 2011) s/p ADT and EBRT with increased severity of dyspnea on exertion presents for surgical consult. CTA chest revealed aortic root measuring 5.6x 5.2cm with aneurysmal dilatation of ascending aorta.  Dr. Fletcher reviewed the CTA, cardiac cath images and echocardiogram images with the patient and  and discussed the case with Dr. Avalos.  Dr. Fletcher discussed the risks, benefits and alternatives to surgery. Risks include but not limited to death, heart attack, bleeding, stroke, kidney problems and infection.  Dr. Fletcher quoted a 5% operative mortality and complication risk. Dr. Fletcher feels the patient will benefit from and is a candidate for Minimally invasive valve sparing aortic root remodeling, hemiarch repair.  All questions were addressed and the patient agrees to proceed with surgery.     Plan  PST completed 7/17/17   3 soaps given  SDA= Minimally invasive valve sparing aortic root remodeling, hemiarch repair.    Patient should follow up with oncologist Dr. Olmstead postoperatively

## 2017-07-20 NOTE — H&P ADULT - HISTORY OF PRESENT ILLNESS
71 year old male, former smoker, with pmHx of HTN, Systolic CHF (EF 40%), MI @ [place] in 2016 (tx medically), questionable Afib (on aspirin only per patient), recurrent prostate cancer with metastasis to the vertebrae and lungs (dx 2011) s/p ADT and EBRT was recently advised to have another ADT/monotherapy with high dose androgen blocker for his prostate cancer, but refused the treatment and would like to explore alternative therapies per MD note.  CT scan (6/21/2017) showed aortic root measuring 5.6x 5.2cm with aneurysmal dilatation of ascending aorta. Patient was originally seen by Dr. Avalos from thoracic surgery for prostate cancer metastases to the lungs (biopsy proven by Dr. Romano). Patient was presented to tumor boards, and it was the general consensus that patient should have surgery to repair aorta and forego further treatment with oncology and urologist Dr. Gomez. Patient was then seen by Dr. Fletcher in the office for surgical evaluation. Patient admits to SOB when walking too fast up a hill, but used to run for miles before he found out he had a problem with his aorta. Echo (5/10/17): EF 40%, moderate concentric LVH, mild global hypokinesis of LV, impaired left ventricular relaxation with mildly elevated LA pressure, mild-moderate AR, PA pressure 25-30mmHg, dilated aortic root. Patient had cardiac cath done on 7/17/17 by Dr. Grayson Baldwin which revealed no coronary artery disease. He now presents for elective surgery with Dr. Fletcher.

## 2017-07-20 NOTE — H&P ADULT - PMH
Benign prostatic hyperplasia, presence of lower urinary tract symptoms unspecified, unspecified morphology    Bronchitis    Congestive heart failure, unspecified congestive heart failure chronicity, unspecified congestive heart failure type    Metastasis    Paroxysmal atrial fibrillation    Prostate cancer    Shortness of breath

## 2017-07-20 NOTE — H&P ADULT - FAMILY HISTORY
Mother  Still living? Unknown  Family history of diabetes mellitus, Age at diagnosis: Age Unknown     Sibling  Still living? Unknown  Family history of cancer in sister, Age at diagnosis: Age Unknown

## 2017-07-20 NOTE — H&P ADULT - NSHPLABSRESULTS_GEN_ALL_CORE
Hgb A1C = 5.7  creat = 1.20  hct= 37.5  hgb= 13.2  plt= 185  WBC= 6.2  INR= 1.06  tot alb= 4.1  tot bili= 0.2    TSH= 32.872    Chest xray: stable enlarged cardio silhouette, without acute cardiopulmonary process    EKG: SB HR 46bpm    Carotid US: no carotid artery disease    PFT's: FEV 1 of 2.60L, 71% flow    Echo (5/10/17): EF 40%, moderate concentric LVH, mild global hypokinesis of LV, impaired left ventricular relaxation with mildly elevated LA pressure, mild-moderate AR, PA pressure 25-30mmHg, dilated aortic root    Cardiac Cath 7/17 with Dr. Grayson Baldwin- no coronary artery disease

## 2017-07-20 NOTE — H&P ADULT - NSHPPHYSICALEXAM_GEN_ALL_CORE
T/HR/RR/BP:  · Temp (F)	 96.5 Degrees F  · Temp (C)	 35.8 Degrees C  · Heart Rate	 46 /min  · Respiration Rate (breaths/min)	17 /min  · BP Systolic	 96 mm Hg  · BP Diastolic	 54 mm Hg  · BP Noninvasive Mean	68 mm Hg  · SpO2 (%)	99 %  · O2 delivery	room air    Physical Exam:  · Constitutional	Well-developed, well nourished  · Eyes	EOMI; PERRL; no drainage or redness  · ENMT	No oral lesions; no gross abnormalities  · Neck	No bruits; no thyromegaly or nodules  · Breasts	not examined  · Back	No deformity or limitation of movement  · Respiratory	Breath Sounds equal & clear to percussion & auscultation, no accessory muscle use  · Cardiovascular	detailed exam  · Cardiovascular Details	bradycardia  · Gastrointestinal	Soft, non-tender, no hepatosplenomegaly, normal bowel sounds  · Genitourinary	not examined  · Rectal	patient refused  · Extremities	No cyanosis, clubbing or edema  · Vascular	detailed exam  · Carotid Pulse	right normal; left normal; 2+ B/L  · Radial Pulse	right normal; left normal; 2+ B/L  · Femoral Pulse	right normal; left normal; 2+ B/L  · DP Pulse	right normal; left normal; 2+ B/L  · PT Pulse	right normal; left normal; 2+ B/L  · Neurological	Alert & oriented; no sensory, motor or coordination deficits, normal reflexes  · Skin	No lesions; no rash  · Lymph Nodes	not examined  · Musculoskeletal	No joint pain, swelling or deformity; no limitation of movement  · Psychiatric	Affect and characteristics of appearance, verbalizations, behaviors are appropriate

## 2017-07-22 LAB
CULTURE RESULTS: SIGNIFICANT CHANGE UP
SPECIMEN SOURCE: SIGNIFICANT CHANGE UP

## 2017-07-24 PROBLEM — I50.9 HEART FAILURE, UNSPECIFIED: Chronic | Status: ACTIVE | Noted: 2017-07-20

## 2017-07-24 PROBLEM — R06.02 SHORTNESS OF BREATH: Chronic | Status: ACTIVE | Noted: 2017-07-20

## 2017-07-24 PROBLEM — N40.0 BENIGN PROSTATIC HYPERPLASIA WITHOUT LOWER URINARY TRACT SYMPTOMS: Chronic | Status: ACTIVE | Noted: 2017-07-20

## 2017-07-24 PROBLEM — C61 MALIGNANT NEOPLASM OF PROSTATE: Chronic | Status: ACTIVE | Noted: 2017-07-20

## 2017-07-24 PROBLEM — C79.9 SECONDARY MALIGNANT NEOPLASM OF UNSPECIFIED SITE: Chronic | Status: ACTIVE | Noted: 2017-07-20

## 2017-07-24 PROBLEM — I48.0 PAROXYSMAL ATRIAL FIBRILLATION: Chronic | Status: ACTIVE | Noted: 2017-07-20

## 2017-07-24 PROBLEM — J40 BRONCHITIS, NOT SPECIFIED AS ACUTE OR CHRONIC: Chronic | Status: ACTIVE | Noted: 2017-07-20

## 2017-07-25 DIAGNOSIS — Z01.810 ENCOUNTER FOR PREPROCEDURAL CARDIOVASCULAR EXAMINATION: ICD-10-CM

## 2017-07-25 DIAGNOSIS — I10 ESSENTIAL (PRIMARY) HYPERTENSION: ICD-10-CM

## 2017-07-25 DIAGNOSIS — I25.110 ATHEROSCLEROTIC HEART DISEASE OF NATIVE CORONARY ARTERY WITH UNSTABLE ANGINA PECTORIS: ICD-10-CM

## 2017-07-25 DIAGNOSIS — E78.5 HYPERLIPIDEMIA, UNSPECIFIED: ICD-10-CM

## 2017-07-26 ENCOUNTER — APPOINTMENT (OUTPATIENT)
Dept: INTERNAL MEDICINE | Facility: CLINIC | Age: 72
End: 2017-07-26

## 2017-07-26 VITALS
WEIGHT: 188.94 LBS | HEART RATE: 50 BPM | RESPIRATION RATE: 16 BRPM | TEMPERATURE: 97 F | HEIGHT: 74 IN | DIASTOLIC BLOOD PRESSURE: 72 MMHG | SYSTOLIC BLOOD PRESSURE: 158 MMHG

## 2017-07-28 RX ORDER — LISINOPRIL 2.5 MG/1
1 TABLET ORAL
Qty: 0 | Refills: 0 | COMMUNITY

## 2017-07-31 ENCOUNTER — INPATIENT (INPATIENT)
Facility: HOSPITAL | Age: 72
LOS: 9 days | Discharge: HOME HEALTH | DRG: 220 | End: 2017-08-10
Attending: THORACIC SURGERY (CARDIOTHORACIC VASCULAR SURGERY) | Admitting: THORACIC SURGERY (CARDIOTHORACIC VASCULAR SURGERY)
Payer: MEDICARE

## 2017-07-31 ENCOUNTER — APPOINTMENT (OUTPATIENT)
Dept: CARDIOTHORACIC SURGERY | Facility: HOSPITAL | Age: 72
End: 2017-07-31
Payer: MEDICARE

## 2017-07-31 ENCOUNTER — RESULT REVIEW (OUTPATIENT)
Age: 72
End: 2017-07-31

## 2017-07-31 DIAGNOSIS — J90 PLEURAL EFFUSION, NOT ELSEWHERE CLASSIFIED: ICD-10-CM
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
## 2017-07-31 DIAGNOSIS — S42.009A FRACTURE OF UNSPECIFIED PART OF UNSPECIFIED CLAVICLE, INITIAL ENCOUNTER FOR CLOSED FRACTURE: Chronic | ICD-10-CM

## 2017-07-31 LAB
ALBUMIN SERPL ELPH-MCNC: 3.7 G/DL — SIGNIFICANT CHANGE UP (ref 3.3–5)
ALP SERPL-CCNC: 77 U/L — SIGNIFICANT CHANGE UP (ref 40–120)
ALT FLD-CCNC: 42 U/L — SIGNIFICANT CHANGE UP (ref 10–45)
ANION GAP SERPL CALC-SCNC: 13 MMOL/L — SIGNIFICANT CHANGE UP (ref 5–17)
ANION GAP SERPL CALC-SCNC: 15 MMOL/L — SIGNIFICANT CHANGE UP (ref 5–17)
ANION GAP SERPL CALC-SCNC: 19 MMOL/L — HIGH (ref 5–17)
APTT BLD: 28.2 SEC — SIGNIFICANT CHANGE UP (ref 27.5–37.4)
APTT BLD: 29.3 SEC — SIGNIFICANT CHANGE UP (ref 27.5–37.4)
APTT BLD: 34 SEC — SIGNIFICANT CHANGE UP (ref 27.5–37.4)
AST SERPL-CCNC: 97 U/L — HIGH (ref 10–40)
BASE EXCESS BLDA CALC-SCNC: -1.1 MMOL/L — SIGNIFICANT CHANGE UP (ref -2–3)
BASE EXCESS BLDA CALC-SCNC: -5.5 MMOL/L — LOW (ref -2–3)
BASE EXCESS BLDA CALC-SCNC: -6.3 MMOL/L — LOW (ref -2–3)
BASOPHILS NFR BLD AUTO: 0.1 % — SIGNIFICANT CHANGE UP (ref 0–2)
BILIRUB SERPL-MCNC: 2.2 MG/DL — HIGH (ref 0.2–1.2)
BUN SERPL-MCNC: 13 MG/DL — SIGNIFICANT CHANGE UP (ref 7–23)
BUN SERPL-MCNC: 14 MG/DL — SIGNIFICANT CHANGE UP (ref 7–23)
BUN SERPL-MCNC: 16 MG/DL — SIGNIFICANT CHANGE UP (ref 7–23)
CA-I BLDA-SCNC: 1.1 MMOL/L — LOW (ref 1.12–1.3)
CALCIUM SERPL-MCNC: 10.4 MG/DL — SIGNIFICANT CHANGE UP (ref 8.4–10.5)
CALCIUM SERPL-MCNC: 9.5 MG/DL — SIGNIFICANT CHANGE UP (ref 8.4–10.5)
CALCIUM SERPL-MCNC: 9.8 MG/DL — SIGNIFICANT CHANGE UP (ref 8.4–10.5)
CHLORIDE SERPL-SCNC: 100 MMOL/L — SIGNIFICANT CHANGE UP (ref 96–108)
CHLORIDE SERPL-SCNC: 102 MMOL/L — SIGNIFICANT CHANGE UP (ref 96–108)
CHLORIDE SERPL-SCNC: 104 MMOL/L — SIGNIFICANT CHANGE UP (ref 96–108)
CO2 SERPL-SCNC: 18 MMOL/L — LOW (ref 22–31)
CO2 SERPL-SCNC: 22 MMOL/L — SIGNIFICANT CHANGE UP (ref 22–31)
CO2 SERPL-SCNC: 23 MMOL/L — SIGNIFICANT CHANGE UP (ref 22–31)
COHGB MFR BLDA: 0.3 % — SIGNIFICANT CHANGE UP
CREAT SERPL-MCNC: 0.6 MG/DL — SIGNIFICANT CHANGE UP (ref 0.5–1.3)
CREAT SERPL-MCNC: 1 MG/DL — SIGNIFICANT CHANGE UP (ref 0.5–1.3)
CREAT SERPL-MCNC: 1.1 MG/DL — SIGNIFICANT CHANGE UP (ref 0.5–1.3)
EOSINOPHIL NFR BLD AUTO: 0.1 % — SIGNIFICANT CHANGE UP (ref 0–6)
GAS PNL BLDA: SIGNIFICANT CHANGE UP
GLUCOSE SERPL-MCNC: 156 MG/DL — HIGH (ref 70–99)
GLUCOSE SERPL-MCNC: 189 MG/DL — HIGH (ref 70–99)
GLUCOSE SERPL-MCNC: 192 MG/DL — HIGH (ref 70–99)
HCO3 BLDA-SCNC: 20 MMOL/L — LOW (ref 21–28)
HCO3 BLDA-SCNC: 21 MMOL/L — SIGNIFICANT CHANGE UP (ref 21–28)
HCO3 BLDA-SCNC: 21 MMOL/L — SIGNIFICANT CHANGE UP (ref 21–28)
HCT VFR BLD CALC: 25.5 % — LOW (ref 39–50)
HCT VFR BLD CALC: 29.7 % — LOW (ref 39–50)
HCT VFR BLD CALC: 31.6 % — LOW (ref 39–50)
HGB BLD-MCNC: 10.8 G/DL — LOW (ref 13–17)
HGB BLD-MCNC: 8.4 G/DL — LOW (ref 13–17)
HGB BLD-MCNC: 9.9 G/DL — LOW (ref 13–17)
HGB BLDA-MCNC: 11.8 G/DL — LOW (ref 13–17)
INR BLD: 1.03 — SIGNIFICANT CHANGE UP (ref 0.88–1.16)
INR BLD: 1.04 — SIGNIFICANT CHANGE UP (ref 0.88–1.16)
INR BLD: 1.04 — SIGNIFICANT CHANGE UP (ref 0.88–1.16)
LACTATE SERPL-SCNC: 2.7 MMOL/L — HIGH (ref 0.5–2)
LACTATE SERPL-SCNC: 4.7 MMOL/L — CRITICAL HIGH (ref 0.5–2)
LYMPHOCYTES # BLD AUTO: 11 % — LOW (ref 13–44)
MAGNESIUM SERPL-MCNC: 2.8 MG/DL — HIGH (ref 1.6–2.6)
MAGNESIUM SERPL-MCNC: 3 MG/DL — HIGH (ref 1.6–2.6)
MAGNESIUM SERPL-MCNC: 3 MG/DL — HIGH (ref 1.6–2.6)
MCHC RBC-ENTMCNC: 31.2 PG — SIGNIFICANT CHANGE UP (ref 27–34)
MCHC RBC-ENTMCNC: 31.5 PG — SIGNIFICANT CHANGE UP (ref 27–34)
MCHC RBC-ENTMCNC: 31.6 PG — SIGNIFICANT CHANGE UP (ref 27–34)
MCHC RBC-ENTMCNC: 32.9 G/DL — SIGNIFICANT CHANGE UP (ref 32–36)
MCHC RBC-ENTMCNC: 33.3 G/DL — SIGNIFICANT CHANGE UP (ref 32–36)
MCHC RBC-ENTMCNC: 34.2 G/DL — SIGNIFICANT CHANGE UP (ref 32–36)
MCV RBC AUTO: 92.4 FL — SIGNIFICANT CHANGE UP (ref 80–100)
MCV RBC AUTO: 93.7 FL — SIGNIFICANT CHANGE UP (ref 80–100)
MCV RBC AUTO: 95.5 FL — SIGNIFICANT CHANGE UP (ref 80–100)
METHGB MFR BLDA: 0.4 % — SIGNIFICANT CHANGE UP
MONOCYTES NFR BLD AUTO: 7.3 % — SIGNIFICANT CHANGE UP (ref 2–14)
NEUTROPHILS NFR BLD AUTO: 81.5 % — HIGH (ref 43–77)
O2 CT VFR BLDA CALC: 17.5 ML/DL — SIGNIFICANT CHANGE UP (ref 15–23)
OXYHGB MFR BLDA: 98 % — SIGNIFICANT CHANGE UP (ref 94–100)
PCO2 BLDA: 29 MMHG — LOW (ref 35–48)
PCO2 BLDA: 40 MMHG — SIGNIFICANT CHANGE UP (ref 35–48)
PCO2 BLDA: 45 MMHG — SIGNIFICANT CHANGE UP (ref 35–48)
PH BLDA: 7.29 — LOW (ref 7.35–7.45)
PH BLDA: 7.3 — LOW (ref 7.35–7.45)
PH BLDA: 7.49 — HIGH (ref 7.35–7.45)
PHOSPHATE SERPL-MCNC: 3.6 MG/DL — SIGNIFICANT CHANGE UP (ref 2.5–4.5)
PHOSPHATE SERPL-MCNC: 4.8 MG/DL — HIGH (ref 2.5–4.5)
PLATELET # BLD AUTO: 150 K/UL — SIGNIFICANT CHANGE UP (ref 150–400)
PLATELET # BLD AUTO: 166 K/UL — SIGNIFICANT CHANGE UP (ref 150–400)
PLATELET # BLD AUTO: 169 K/UL — SIGNIFICANT CHANGE UP (ref 150–400)
PO2 BLDA: 332 MMHG — HIGH (ref 83–108)
PO2 BLDA: 421 MMHG — HIGH (ref 83–108)
PO2 BLDA: 91 MMHG — SIGNIFICANT CHANGE UP (ref 83–108)
POTASSIUM BLDA-SCNC: 4 MMOL/L — SIGNIFICANT CHANGE UP (ref 3.5–4.9)
POTASSIUM SERPL-MCNC: 4.3 MMOL/L — SIGNIFICANT CHANGE UP (ref 3.5–5.3)
POTASSIUM SERPL-MCNC: 4.6 MMOL/L — SIGNIFICANT CHANGE UP (ref 3.5–5.3)
POTASSIUM SERPL-MCNC: 4.8 MMOL/L — SIGNIFICANT CHANGE UP (ref 3.5–5.3)
POTASSIUM SERPL-SCNC: 4.3 MMOL/L — SIGNIFICANT CHANGE UP (ref 3.5–5.3)
POTASSIUM SERPL-SCNC: 4.6 MMOL/L — SIGNIFICANT CHANGE UP (ref 3.5–5.3)
POTASSIUM SERPL-SCNC: 4.8 MMOL/L — SIGNIFICANT CHANGE UP (ref 3.5–5.3)
PROT SERPL-MCNC: 6.8 G/DL — SIGNIFICANT CHANGE UP (ref 6–8.3)
PROTHROM AB SERPL-ACNC: 11.4 SEC — SIGNIFICANT CHANGE UP (ref 9.8–12.7)
PROTHROM AB SERPL-ACNC: 11.5 SEC — SIGNIFICANT CHANGE UP (ref 9.8–12.7)
PROTHROM AB SERPL-ACNC: 11.6 SEC — SIGNIFICANT CHANGE UP (ref 9.8–12.7)
RBC # BLD: 2.67 M/UL — LOW (ref 4.2–5.8)
RBC # BLD: 3.17 M/UL — LOW (ref 4.2–5.8)
RBC # BLD: 3.42 M/UL — LOW (ref 4.2–5.8)
RBC # FLD: 13.8 % — SIGNIFICANT CHANGE UP (ref 10.3–16.9)
RBC # FLD: 14.6 % — SIGNIFICANT CHANGE UP (ref 10.3–16.9)
RBC # FLD: 15.1 % — SIGNIFICANT CHANGE UP (ref 10.3–16.9)
SAO2 % BLDA: 100 % — SIGNIFICANT CHANGE UP (ref 95–100)
SAO2 % BLDA: 96 % — SIGNIFICANT CHANGE UP (ref 95–100)
SAO2 % BLDA: 99 % — SIGNIFICANT CHANGE UP (ref 95–100)
SODIUM BLDA-SCNC: 140 MMOL/L — SIGNIFICANT CHANGE UP (ref 138–146)
SODIUM SERPL-SCNC: 137 MMOL/L — SIGNIFICANT CHANGE UP (ref 135–145)
SODIUM SERPL-SCNC: 139 MMOL/L — SIGNIFICANT CHANGE UP (ref 135–145)
SODIUM SERPL-SCNC: 140 MMOL/L — SIGNIFICANT CHANGE UP (ref 135–145)
WBC # BLD: 11.4 K/UL — HIGH (ref 3.8–10.5)
WBC # BLD: 7.7 K/UL — SIGNIFICANT CHANGE UP (ref 3.8–10.5)
WBC # BLD: 8.3 K/UL — SIGNIFICANT CHANGE UP (ref 3.8–10.5)
WBC # FLD AUTO: 11.4 K/UL — HIGH (ref 3.8–10.5)
WBC # FLD AUTO: 7.7 K/UL — SIGNIFICANT CHANGE UP (ref 3.8–10.5)
WBC # FLD AUTO: 8.3 K/UL — SIGNIFICANT CHANGE UP (ref 3.8–10.5)

## 2017-07-31 PROCEDURE — 33864 ASCENDING AORTIC GRAFT: CPT

## 2017-07-31 PROCEDURE — 93010 ELECTROCARDIOGRAM REPORT: CPT

## 2017-07-31 PROCEDURE — 71010: CPT | Mod: 26

## 2017-07-31 PROCEDURE — 33390 VALVULOPLASTY AORTIC VALVE: CPT | Mod: 59

## 2017-07-31 PROCEDURE — 33870: CPT | Mod: 80,59

## 2017-07-31 PROCEDURE — 33864 ASCENDING AORTIC GRAFT: CPT | Mod: 80

## 2017-07-31 PROCEDURE — 33870: CPT | Mod: 59

## 2017-07-31 PROCEDURE — 33390 VALVULOPLASTY AORTIC VALVE: CPT | Mod: 80,59

## 2017-07-31 PROCEDURE — 99291 CRITICAL CARE FIRST HOUR: CPT

## 2017-07-31 RX ORDER — INSULIN HUMAN 100 [IU]/ML
1 INJECTION, SOLUTION SUBCUTANEOUS
Qty: 250 | Refills: 0 | Status: DISCONTINUED | OUTPATIENT
Start: 2017-07-31 | End: 2017-08-01

## 2017-07-31 RX ORDER — CEFAZOLIN SODIUM 1 G
2000 VIAL (EA) INJECTION EVERY 8 HOURS
Qty: 0 | Refills: 0 | Status: COMPLETED | OUTPATIENT
Start: 2017-07-31 | End: 2017-08-02

## 2017-07-31 RX ORDER — BICALUTAMIDE 50 MG/1
50 TABLET, FILM COATED ORAL DAILY
Qty: 0 | Refills: 0 | Status: DISCONTINUED | OUTPATIENT
Start: 2017-07-31 | End: 2017-08-02

## 2017-07-31 RX ORDER — HEPARIN SODIUM 5000 [USP'U]/ML
5000 INJECTION INTRAVENOUS; SUBCUTANEOUS EVERY 8 HOURS
Qty: 0 | Refills: 0 | Status: DISCONTINUED | OUTPATIENT
Start: 2017-07-31 | End: 2017-08-09

## 2017-07-31 RX ORDER — FENTANYL CITRATE 50 UG/ML
25 INJECTION INTRAVENOUS
Qty: 0 | Refills: 0 | Status: DISCONTINUED | OUTPATIENT
Start: 2017-07-31 | End: 2017-07-31

## 2017-07-31 RX ORDER — ACETAMINOPHEN 500 MG
1000 TABLET ORAL ONCE
Qty: 0 | Refills: 0 | Status: COMPLETED | OUTPATIENT
Start: 2017-07-31 | End: 2017-08-01

## 2017-07-31 RX ORDER — DEXMEDETOMIDINE HYDROCHLORIDE IN 0.9% SODIUM CHLORIDE 4 UG/ML
0.2 INJECTION INTRAVENOUS
Qty: 200 | Refills: 0 | Status: DISCONTINUED | OUTPATIENT
Start: 2017-07-31 | End: 2017-08-01

## 2017-07-31 RX ORDER — MORPHINE SULFATE 50 MG/1
2 CAPSULE, EXTENDED RELEASE ORAL ONCE
Qty: 0 | Refills: 0 | Status: DISCONTINUED | OUTPATIENT
Start: 2017-07-31 | End: 2017-07-31

## 2017-07-31 RX ORDER — TAMSULOSIN HYDROCHLORIDE 0.4 MG/1
0.4 CAPSULE ORAL AT BEDTIME
Qty: 0 | Refills: 0 | Status: DISCONTINUED | OUTPATIENT
Start: 2017-07-31 | End: 2017-08-10

## 2017-07-31 RX ORDER — FAMOTIDINE 10 MG/ML
20 INJECTION INTRAVENOUS EVERY 12 HOURS
Qty: 0 | Refills: 0 | Status: DISCONTINUED | OUTPATIENT
Start: 2017-07-31 | End: 2017-08-01

## 2017-07-31 RX ORDER — ATORVASTATIN CALCIUM 80 MG/1
40 TABLET, FILM COATED ORAL AT BEDTIME
Qty: 0 | Refills: 0 | Status: DISCONTINUED | OUTPATIENT
Start: 2017-07-31 | End: 2017-08-10

## 2017-07-31 RX ORDER — SODIUM BICARBONATE 1 MEQ/ML
50 SYRINGE (ML) INTRAVENOUS ONCE
Qty: 0 | Refills: 0 | Status: COMPLETED | OUTPATIENT
Start: 2017-07-31 | End: 2017-07-31

## 2017-07-31 RX ORDER — SODIUM CHLORIDE 9 MG/ML
1000 INJECTION, SOLUTION INTRAVENOUS
Qty: 0 | Refills: 0 | Status: DISCONTINUED | OUTPATIENT
Start: 2017-07-31 | End: 2017-08-10

## 2017-07-31 RX ORDER — MORPHINE SULFATE 50 MG/1
2 CAPSULE, EXTENDED RELEASE ORAL
Qty: 0 | Refills: 0 | Status: DISCONTINUED | OUTPATIENT
Start: 2017-07-31 | End: 2017-08-01

## 2017-07-31 RX ORDER — MEPERIDINE HYDROCHLORIDE 50 MG/ML
25 INJECTION INTRAMUSCULAR; INTRAVENOUS; SUBCUTANEOUS ONCE
Qty: 0 | Refills: 0 | Status: DISCONTINUED | OUTPATIENT
Start: 2017-07-31 | End: 2017-07-31

## 2017-07-31 RX ORDER — EPINEPHRINE 0.3 MG/.3ML
0.01 INJECTION INTRAMUSCULAR; SUBCUTANEOUS
Qty: 4 | Refills: 0 | Status: DISCONTINUED | OUTPATIENT
Start: 2017-07-31 | End: 2017-08-01

## 2017-07-31 RX ORDER — CLEVIDIPINE BUTYRATE 50MG/100ML
2 VIAL (ML) INTRAVENOUS
Qty: 25 | Refills: 0 | Status: DISCONTINUED | OUTPATIENT
Start: 2017-07-31 | End: 2017-08-01

## 2017-07-31 RX ORDER — PROPOFOL 10 MG/ML
5 INJECTION, EMULSION INTRAVENOUS
Qty: 1000 | Refills: 0 | Status: DISCONTINUED | OUTPATIENT
Start: 2017-07-31 | End: 2017-08-01

## 2017-07-31 RX ADMIN — MORPHINE SULFATE 2 MILLIGRAM(S): 50 CAPSULE, EXTENDED RELEASE ORAL at 19:09

## 2017-07-31 RX ADMIN — MORPHINE SULFATE 2 MILLIGRAM(S): 50 CAPSULE, EXTENDED RELEASE ORAL at 21:00

## 2017-07-31 RX ADMIN — DEXMEDETOMIDINE HYDROCHLORIDE IN 0.9% SODIUM CHLORIDE 4.29 MICROGRAM(S)/KG/HR: 4 INJECTION INTRAVENOUS at 16:55

## 2017-07-31 RX ADMIN — PROPOFOL 2.57 MICROGRAM(S)/KG/MIN: 10 INJECTION, EMULSION INTRAVENOUS at 16:16

## 2017-07-31 RX ADMIN — Medication 100 MILLIGRAM(S): at 21:34

## 2017-07-31 RX ADMIN — MORPHINE SULFATE 2 MILLIGRAM(S): 50 CAPSULE, EXTENDED RELEASE ORAL at 21:15

## 2017-07-31 RX ADMIN — EPINEPHRINE 3.21 MICROGRAM(S)/KG/MIN: 0.3 INJECTION INTRAMUSCULAR; SUBCUTANEOUS at 16:56

## 2017-07-31 RX ADMIN — Medication 50 MILLIEQUIVALENT(S): at 17:46

## 2017-07-31 RX ADMIN — INSULIN HUMAN 1 UNIT(S)/HR: 100 INJECTION, SOLUTION SUBCUTANEOUS at 16:55

## 2017-07-31 NOTE — PROGRESS NOTE ADULT - ASSESSMENT
70yo with history of prostate cancer mets to lung, lumbar spine (L4), supraclavicular and RP lymph nodes.  Pt had recent CT scan that showed aortic root aneurysm 5.5 X5.1 cm, per daughter dyspnea on exertion and recurrent episodes of  syncope.  Pt has h/o CHF with EF 40%.  He had an elective valve sparing root with  hemiarch replacement.  Received multiple blood products in the OR, arrived on low dose pressors which were quickly weaned.      Problems  1. S/p complex aortic root aneurysm replacement  2. hemodynamic instability  3. post op respiratory failure    Plan  Neuro -- pt s/p Circ arrest, early sedation weaning once hemodynamics have stabilized   CVS - hemodynamic support, monitor for arrhythmia.  Monitor for bleeding. Goal SBP < 120  wean pressors as tolerated  chest tube management  Pulm - vent weaning as tolerated   GI - GI proph   - monitor UOP  Endo - glycemic control  Heme - correct coagulapathy, monitor for bleeding  ID - periop antibiotics.       Critical post op.    Critical care time spent 50 min

## 2017-07-31 NOTE — BRIEF OPERATIVE NOTE - PROCEDURE
Aortic root replacement, valve sparing  07/31/2017  Aortic Valve Repair, Hemiarch replacement  Active  MODONOGUE

## 2017-07-31 NOTE — PROGRESS NOTE ADULT - SUBJECTIVE AND OBJECTIVE BOX
Date of surgery : 7/31/2017    Surgeon : Chance     Anesthesia : Leggat    Procedure : Valve sparing aortic root with pt's own native valve sewn into valsalva graft, re-implanted coronaries, Hemiarch replacement with selective antegrade cerebral perfusion     Pump Time :        156cm                 Aortic X -Clamp :      147cm                     Circulatory Arrest : CA+ ACP =19 min    EF :     Pre OP :       40             Post OP :        45%                                   Airway :      Difficult Airway :                   [ ] Yes     [ x] No      ETT             Size :       8      Lip Line :    21cm         Ventilator setting :              [x ] ON          [ x] BS +           [x ] ETCO2 : 33      Mode: AC/ CMV (Assist Control/ Continuous Mandatory Ventilation)  RR (machine): 16  TV (machine): 500  FiO2: 80  PEEP: 10  ITime: 1  MAP: 14  PIP: 24          Lines :      [ ] PA catheter      [x ] Radial Arterial Line              [ x ] Right              [  ] Left       [ ] Femoral Arterial Line          [  ] Right              [  ] Left      [ x] Central Line   IJ                     [ x ] Right              [  ] Left      [ ] Femoral Central line            [  ] Right              [  ] Left     Tubes :      Blakes :                                      [ 3 ] Med.             [  ] Pleural      Chest Tubes :                           [ right ] Med.             [  ] Pleural       Pacing     Wires :             [   ] Atrial                  [x ]  Venticular      Pacer Setting :        VVI @ 40        Threshold  :                Sensitivity :       Intake     Drips :   Epi 0.02mcg/kg/min     IVF : 3000ml     Albumin :     Product :             [  3]  PRBC       [2] Platelet     [ 4  ] FFP          [ 10units] Cryoprecipitate                                  [   ]  Cell saver : 650                                  [   ]  Hemostatic agent :                                  [  x ]   Factors :  FEIBA 1000units     Output      EBL :      Urine : 2100ml       Antibiotics : Ancef   ICU Vital Signs Last 24 Hrs  T(C): 35.9 (07-31-17 @ 15:15), Max: 35.9 (07-31-17 @ 15:15)  T(F): 96.7 (07-31-17 @ 15:15), Max: 96.7 (07-31-17 @ 15:15)  HR: 78 (07-31-17 @ 16:45) (76 - 86)  ABP: 120/72 (07-31-17 @ 16:45) (106/66 - 126/84)  ABP(mean): 90 (07-31-17 @ 16:45) (82 - 100)  RR: 16 (07-31-17 @ 16:45) (15 - 23)  SpO2: 100% (07-31-17 @ 16:45) (100% - 100%)    I&O's Summary    31 Jul 2017 07:01  -  31 Jul 2017 16:57  --------------------------------------------------------  IN: 335 mL / OUT: 260 mL / NET: 75 mL      ABG - ( 31 Jul 2017 16:37 )  pH: 7.29  /  pCO2: 45    /  pO2: 332   / HCO3: 21    / Base Excess: -5.5  /  SaO2: 100                             8.4    11.4  )-----------( 166      ( 31 Jul 2017 15:55 )             25.5     31 Jul 2017 15:54    137    |  100    |  13     ----------------------------<  189    4.3     |  18     |  0.60     Ca    10.4       31 Jul 2017 15:54  Phos  4.8       31 Jul 2017 15:54  Mg     3.0       31 Jul 2017 15:54      PT/INR - ( 31 Jul 2017 15:55 )   PT: 11.4 sec;   INR: 1.03      PTT - ( 31 Jul 2017 15:55 )  PTT:29.3 sec    MEDICATIONS  (STANDING):  sodium chloride 0.45%. 1000 milliLiter(s) IV Continuous <Continuous>  heparin  Injectable 5000 Unit(s) SubCutaneous every 8 hours  ceFAZolin   IVPB 2000 milliGRAM(s) IV Intermittent every 8 hours  insulin Infusion 1 Unit(s)/Hr IV Continuous <Continuous>  propofol Infusion 5 MICROgram(s)/kG/Min IV Continuous <Continuous>  EPINEPHrine     Infusion 0.01 MICROgram(s)/kG/Min IV Continuous <Continuous>  dexmedetomidine Infusion 0.2 MICROgram(s)/kG/Hr IV Continuous <Continuous>      Shortness of breath  Bronchitis  Paroxysmal atrial fibrillation  Congestive heart failure, unspecified congestive heart failure chronicity, unspecified congestive heart failure type  Metastasis  Prostate cancer  Benign prostatic hyperplasia, presence of lower urinary tract symptoms unspecified, unspecified morphology  Aortic root aneurysm  Aortic root replacement, valve sparing  Collar bone fracture      PHYSICAL EXAM:  Gen : no acute distress  Neck: No LAD, No JVD  Respiratory: decreased in the bases  Cardiovascular: S1 and S2, RRR, no M/G/R  Gastrointestinal: BS+, soft, NT/ND  Extremities: No peripheral edema  Vascular: 2+ peripheral pulses  Neurological: A/O x 3, no focal deficits  Incision: clean dry/ no sign of infection  Lines: no sign of infection Date of surgery : 7/31/2017    Surgeon : Chance     Anesthesia : Leggat    Procedure : Valve sparing aortic root with pt's own native valve sewn into valsalva graft, re-implanted coronaries, Hemiarch replacement with selective antegrade cerebral perfusion     Pump Time :        156cm                 Aortic X -Clamp :      147cm                     Circulatory Arrest : CA+ ACP =19 min    EF :     Pre OP :       40             Post OP :        45%                                   Airway :      Difficult Airway :                   [ ] Yes     [ x] No      ETT             Size :       8      Lip Line :    21cm         Ventilator setting :              [x ] ON          [ x] BS +           [x ] ETCO2 : 33      Mode: AC/ CMV (Assist Control/ Continuous Mandatory Ventilation)  RR (machine): 16  TV (machine): 500  FiO2: 80  PEEP: 10  ITime: 1  MAP: 14  PIP: 24          Lines :      [ ] PA catheter      [x ] Radial Arterial Line              [ x ] Right              [  ] Left       [ ] Femoral Arterial Line          [  ] Right              [  ] Left      [ x] Central Line   IJ                     [ x ] Right              [  ] Left      [ ] Femoral Central line            [  ] Right              [  ] Left     Tubes :      Blakes :                                      [ 3 ] Med.             [  ] Pleural      Chest Tubes :                           [ right ] Med.             [  ] Pleural       Pacing     Wires :             [   ] Atrial                  [x ]  Venticular      Pacer Setting :        VVI @ 40        Threshold  :                Sensitivity :       Intake     Drips :   Epi 0.02mcg/kg/min     IVF : 3000ml     Albumin :     Product :             [  3]  PRBC       [2] Platelet     [ 4  ] FFP          [ 10units] Cryoprecipitate                                  [   ]  Cell saver : 650                                  [   ]  Hemostatic agent :                                  [  x ]   Factors :  FEIBA 1000units     Output      EBL :      Urine : 2100ml       Antibiotics : Ancef   ICU Vital Signs Last 24 Hrs  T(C): 35.9 (07-31-17 @ 15:15), Max: 35.9 (07-31-17 @ 15:15)  T(F): 96.7 (07-31-17 @ 15:15), Max: 96.7 (07-31-17 @ 15:15)  HR: 78 (07-31-17 @ 16:45) (76 - 86)  ABP: 120/72 (07-31-17 @ 16:45) (106/66 - 126/84)  ABP(mean): 90 (07-31-17 @ 16:45) (82 - 100)  RR: 16 (07-31-17 @ 16:45) (15 - 23)  SpO2: 100% (07-31-17 @ 16:45) (100% - 100%)    I&O's Summary    31 Jul 2017 07:01  -  31 Jul 2017 16:57  --------------------------------------------------------  IN: 335 mL / OUT: 260 mL / NET: 75 mL      ABG - ( 31 Jul 2017 16:37 )  pH: 7.29  /  pCO2: 45    /  pO2: 332   / HCO3: 21    / Base Excess: -5.5  /  SaO2: 100                             8.4    11.4  )-----------( 166      ( 31 Jul 2017 15:55 )             25.5     31 Jul 2017 15:54    137    |  100    |  13     ----------------------------<  189    4.3     |  18     |  0.60     Ca    10.4       31 Jul 2017 15:54  Phos  4.8       31 Jul 2017 15:54  Mg     3.0       31 Jul 2017 15:54      PT/INR - ( 31 Jul 2017 15:55 )   PT: 11.4 sec;   INR: 1.03      PTT - ( 31 Jul 2017 15:55 )  PTT:29.3 sec    MEDICATIONS  (STANDING):  sodium chloride 0.45%. 1000 milliLiter(s) IV Continuous <Continuous>  heparin  Injectable 5000 Unit(s) SubCutaneous every 8 hours  ceFAZolin   IVPB 2000 milliGRAM(s) IV Intermittent every 8 hours  insulin Infusion 1 Unit(s)/Hr IV Continuous <Continuous>  propofol Infusion 5 MICROgram(s)/kG/Min IV Continuous <Continuous>  EPINEPHrine     Infusion 0.01 MICROgram(s)/kG/Min IV Continuous <Continuous>  dexmedetomidine Infusion 0.2 MICROgram(s)/kG/Hr IV Continuous <Continuous>      Paroxysmal atrial fibrillation  Congestive heart failure, unspecified congestive heart failure chronicity, unspecified congestive heart failure type  Metastasis  Prostate cancer  Benign prostatic hyperplasia, presence of lower urinary tract symptoms unspecified, unspecified morphology  Aortic root aneurysm  Aortic root replacement, valve sparing  Collar bone fracture      PHYSICAL EXAM:  Gen : iontubated sedated   Respiratory: decreased in the bases  Cardiovascular: S1 and S2, RRR, no M/G/R  Gastrointestinal: BS+, soft, NT/ND  Extremities: No peripheral edema  Vascular: 2+ peripheral pulses  Incision: clean dry/ no sign of infection

## 2017-08-01 LAB
ALBUMIN SERPL ELPH-MCNC: 3.4 G/DL — SIGNIFICANT CHANGE UP (ref 3.3–5)
ALBUMIN SERPL ELPH-MCNC: 3.4 G/DL — SIGNIFICANT CHANGE UP (ref 3.3–5)
ALBUMIN SERPL ELPH-MCNC: 3.6 G/DL — SIGNIFICANT CHANGE UP (ref 3.3–5)
ALP SERPL-CCNC: 61 U/L — SIGNIFICANT CHANGE UP (ref 40–120)
ALP SERPL-CCNC: 68 U/L — SIGNIFICANT CHANGE UP (ref 40–120)
ALP SERPL-CCNC: 73 U/L — SIGNIFICANT CHANGE UP (ref 40–120)
ALT FLD-CCNC: 29 U/L — SIGNIFICANT CHANGE UP (ref 10–45)
ALT FLD-CCNC: 35 U/L — SIGNIFICANT CHANGE UP (ref 10–45)
ALT FLD-CCNC: 37 U/L — SIGNIFICANT CHANGE UP (ref 10–45)
ANION GAP SERPL CALC-SCNC: 12 MMOL/L — SIGNIFICANT CHANGE UP (ref 5–17)
ANION GAP SERPL CALC-SCNC: 15 MMOL/L — SIGNIFICANT CHANGE UP (ref 5–17)
ANION GAP SERPL CALC-SCNC: 17 MMOL/L — SIGNIFICANT CHANGE UP (ref 5–17)
APTT BLD: 24.8 SEC — LOW (ref 27.5–37.4)
APTT BLD: 27.6 SEC — SIGNIFICANT CHANGE UP (ref 27.5–37.4)
AST SERPL-CCNC: 105 U/L — HIGH (ref 10–40)
AST SERPL-CCNC: 106 U/L — HIGH (ref 10–40)
AST SERPL-CCNC: 99 U/L — HIGH (ref 10–40)
BASE EXCESS BLDA CALC-SCNC: -3 MMOL/L — LOW (ref -2–3)
BILIRUB SERPL-MCNC: 0.8 MG/DL — SIGNIFICANT CHANGE UP (ref 0.2–1.2)
BILIRUB SERPL-MCNC: 1.3 MG/DL — HIGH (ref 0.2–1.2)
BILIRUB SERPL-MCNC: 2 MG/DL — HIGH (ref 0.2–1.2)
BUN SERPL-MCNC: 18 MG/DL — SIGNIFICANT CHANGE UP (ref 7–23)
BUN SERPL-MCNC: 18 MG/DL — SIGNIFICANT CHANGE UP (ref 7–23)
BUN SERPL-MCNC: 23 MG/DL — SIGNIFICANT CHANGE UP (ref 7–23)
BUN SERPL-MCNC: 32 MG/DL — HIGH (ref 7–23)
BUN SERPL-MCNC: 36 MG/DL — HIGH (ref 7–23)
CALCIUM SERPL-MCNC: 8.6 MG/DL — SIGNIFICANT CHANGE UP (ref 8.4–10.5)
CALCIUM SERPL-MCNC: 8.6 MG/DL — SIGNIFICANT CHANGE UP (ref 8.4–10.5)
CALCIUM SERPL-MCNC: 8.9 MG/DL — SIGNIFICANT CHANGE UP (ref 8.4–10.5)
CALCIUM SERPL-MCNC: 9.1 MG/DL — SIGNIFICANT CHANGE UP (ref 8.4–10.5)
CALCIUM SERPL-MCNC: 9.4 MG/DL — SIGNIFICANT CHANGE UP (ref 8.4–10.5)
CHLORIDE SERPL-SCNC: 102 MMOL/L — SIGNIFICANT CHANGE UP (ref 96–108)
CHLORIDE SERPL-SCNC: 102 MMOL/L — SIGNIFICANT CHANGE UP (ref 96–108)
CHLORIDE SERPL-SCNC: 105 MMOL/L — SIGNIFICANT CHANGE UP (ref 96–108)
CHLORIDE SERPL-SCNC: 105 MMOL/L — SIGNIFICANT CHANGE UP (ref 96–108)
CHLORIDE SERPL-SCNC: 106 MMOL/L — SIGNIFICANT CHANGE UP (ref 96–108)
CO2 SERPL-SCNC: 18 MMOL/L — LOW (ref 22–31)
CO2 SERPL-SCNC: 20 MMOL/L — LOW (ref 22–31)
CO2 SERPL-SCNC: 22 MMOL/L — SIGNIFICANT CHANGE UP (ref 22–31)
CO2 SERPL-SCNC: 23 MMOL/L — SIGNIFICANT CHANGE UP (ref 22–31)
CO2 SERPL-SCNC: 23 MMOL/L — SIGNIFICANT CHANGE UP (ref 22–31)
CREAT SERPL-MCNC: 1 MG/DL — SIGNIFICANT CHANGE UP (ref 0.5–1.3)
CREAT SERPL-MCNC: 1 MG/DL — SIGNIFICANT CHANGE UP (ref 0.5–1.3)
CREAT SERPL-MCNC: 1.1 MG/DL — SIGNIFICANT CHANGE UP (ref 0.5–1.3)
CREAT SERPL-MCNC: 1.5 MG/DL — HIGH (ref 0.5–1.3)
CREAT SERPL-MCNC: 1.6 MG/DL — HIGH (ref 0.5–1.3)
GAS PNL BLDA: SIGNIFICANT CHANGE UP
GLUCOSE SERPL-MCNC: 101 MG/DL — HIGH (ref 70–99)
GLUCOSE SERPL-MCNC: 156 MG/DL — HIGH (ref 70–99)
GLUCOSE SERPL-MCNC: 198 MG/DL — HIGH (ref 70–99)
GLUCOSE SERPL-MCNC: 204 MG/DL — HIGH (ref 70–99)
GLUCOSE SERPL-MCNC: 96 MG/DL — SIGNIFICANT CHANGE UP (ref 70–99)
HCO3 BLDA-SCNC: 20 MMOL/L — LOW (ref 21–28)
HCT VFR BLD CALC: 27 % — LOW (ref 39–50)
HCT VFR BLD CALC: 28 % — LOW (ref 39–50)
HCT VFR BLD CALC: 29.1 % — LOW (ref 39–50)
HCT VFR BLD CALC: 31.2 % — LOW (ref 39–50)
HGB BLD-MCNC: 10.2 G/DL — LOW (ref 13–17)
HGB BLD-MCNC: 10.6 G/DL — LOW (ref 13–17)
HGB BLD-MCNC: 9.4 G/DL — LOW (ref 13–17)
HGB BLD-MCNC: 9.6 G/DL — LOW (ref 13–17)
INR BLD: 1.16 — SIGNIFICANT CHANGE UP (ref 0.88–1.16)
INR BLD: 1.41 — HIGH (ref 0.88–1.16)
LACTATE SERPL-SCNC: 1.5 MMOL/L — SIGNIFICANT CHANGE UP (ref 0.5–2)
LACTATE SERPL-SCNC: 1.5 MMOL/L — SIGNIFICANT CHANGE UP (ref 0.5–2)
LACTATE SERPL-SCNC: 2.8 MMOL/L — HIGH (ref 0.5–2)
LACTATE SERPL-SCNC: 3.5 MMOL/L — HIGH (ref 0.5–2)
MAGNESIUM SERPL-MCNC: 2.4 MG/DL — SIGNIFICANT CHANGE UP (ref 1.6–2.6)
MAGNESIUM SERPL-MCNC: 2.4 MG/DL — SIGNIFICANT CHANGE UP (ref 1.6–2.6)
MAGNESIUM SERPL-MCNC: 2.5 MG/DL — SIGNIFICANT CHANGE UP (ref 1.6–2.6)
MAGNESIUM SERPL-MCNC: 2.6 MG/DL — SIGNIFICANT CHANGE UP (ref 1.6–2.6)
MCHC RBC-ENTMCNC: 31 PG — SIGNIFICANT CHANGE UP (ref 27–34)
MCHC RBC-ENTMCNC: 31.2 PG — SIGNIFICANT CHANGE UP (ref 27–34)
MCHC RBC-ENTMCNC: 31.2 PG — SIGNIFICANT CHANGE UP (ref 27–34)
MCHC RBC-ENTMCNC: 31.8 PG — SIGNIFICANT CHANGE UP (ref 27–34)
MCHC RBC-ENTMCNC: 34 G/DL — SIGNIFICANT CHANGE UP (ref 32–36)
MCHC RBC-ENTMCNC: 34.3 G/DL — SIGNIFICANT CHANGE UP (ref 32–36)
MCHC RBC-ENTMCNC: 34.8 G/DL — SIGNIFICANT CHANGE UP (ref 32–36)
MCHC RBC-ENTMCNC: 35.1 G/DL — SIGNIFICANT CHANGE UP (ref 32–36)
MCV RBC AUTO: 89 FL — SIGNIFICANT CHANGE UP (ref 80–100)
MCV RBC AUTO: 89.1 FL — SIGNIFICANT CHANGE UP (ref 80–100)
MCV RBC AUTO: 91.8 FL — SIGNIFICANT CHANGE UP (ref 80–100)
MCV RBC AUTO: 92.7 FL — SIGNIFICANT CHANGE UP (ref 80–100)
PCO2 BLDA: 29 MMHG — LOW (ref 35–48)
PCO2 BLDA: 30 MMHG — LOW (ref 35–48)
PH BLDA: 7.44 — SIGNIFICANT CHANGE UP (ref 7.35–7.45)
PH BLDA: 7.45 — SIGNIFICANT CHANGE UP (ref 7.35–7.45)
PHOSPHATE SERPL-MCNC: 3.4 MG/DL — SIGNIFICANT CHANGE UP (ref 2.5–4.5)
PHOSPHATE SERPL-MCNC: 3.6 MG/DL — SIGNIFICANT CHANGE UP (ref 2.5–4.5)
PHOSPHATE SERPL-MCNC: 4.4 MG/DL — SIGNIFICANT CHANGE UP (ref 2.5–4.5)
PLATELET # BLD AUTO: 124 K/UL — LOW (ref 150–400)
PLATELET # BLD AUTO: 129 K/UL — LOW (ref 150–400)
PLATELET # BLD AUTO: 154 K/UL — SIGNIFICANT CHANGE UP (ref 150–400)
PLATELET # BLD AUTO: 165 K/UL — SIGNIFICANT CHANGE UP (ref 150–400)
PO2 BLDA: 101 MMHG — SIGNIFICANT CHANGE UP (ref 83–108)
PO2 BLDA: 126 MMHG — HIGH (ref 83–108)
POTASSIUM SERPL-MCNC: 4 MMOL/L — SIGNIFICANT CHANGE UP (ref 3.5–5.3)
POTASSIUM SERPL-MCNC: 4.2 MMOL/L — SIGNIFICANT CHANGE UP (ref 3.5–5.3)
POTASSIUM SERPL-MCNC: 4.2 MMOL/L — SIGNIFICANT CHANGE UP (ref 3.5–5.3)
POTASSIUM SERPL-MCNC: 4.4 MMOL/L — SIGNIFICANT CHANGE UP (ref 3.5–5.3)
POTASSIUM SERPL-MCNC: 4.6 MMOL/L — SIGNIFICANT CHANGE UP (ref 3.5–5.3)
POTASSIUM SERPL-SCNC: 4 MMOL/L — SIGNIFICANT CHANGE UP (ref 3.5–5.3)
POTASSIUM SERPL-SCNC: 4.2 MMOL/L — SIGNIFICANT CHANGE UP (ref 3.5–5.3)
POTASSIUM SERPL-SCNC: 4.2 MMOL/L — SIGNIFICANT CHANGE UP (ref 3.5–5.3)
POTASSIUM SERPL-SCNC: 4.4 MMOL/L — SIGNIFICANT CHANGE UP (ref 3.5–5.3)
POTASSIUM SERPL-SCNC: 4.6 MMOL/L — SIGNIFICANT CHANGE UP (ref 3.5–5.3)
PROT SERPL-MCNC: 6.6 G/DL — SIGNIFICANT CHANGE UP (ref 6–8.3)
PROT SERPL-MCNC: 6.7 G/DL — SIGNIFICANT CHANGE UP (ref 6–8.3)
PROT SERPL-MCNC: 6.7 G/DL — SIGNIFICANT CHANGE UP (ref 6–8.3)
PROTHROM AB SERPL-ACNC: 12.9 SEC — HIGH (ref 9.8–12.7)
PROTHROM AB SERPL-ACNC: 15.8 SEC — HIGH (ref 9.8–12.7)
RBC # BLD: 3.02 M/UL — LOW (ref 4.2–5.8)
RBC # BLD: 3.03 M/UL — LOW (ref 4.2–5.8)
RBC # BLD: 3.27 M/UL — LOW (ref 4.2–5.8)
RBC # BLD: 3.4 M/UL — LOW (ref 4.2–5.8)
RBC # FLD: 15.2 % — SIGNIFICANT CHANGE UP (ref 10.3–16.9)
RBC # FLD: 15.3 % — SIGNIFICANT CHANGE UP (ref 10.3–16.9)
RBC # FLD: 15.5 % — SIGNIFICANT CHANGE UP (ref 10.3–16.9)
RBC # FLD: 15.7 % — SIGNIFICANT CHANGE UP (ref 10.3–16.9)
SAO2 % BLDA: 98 % — SIGNIFICANT CHANGE UP (ref 95–100)
SAO2 % BLDA: 99 % — SIGNIFICANT CHANGE UP (ref 95–100)
SODIUM SERPL-SCNC: 136 MMOL/L — SIGNIFICANT CHANGE UP (ref 135–145)
SODIUM SERPL-SCNC: 137 MMOL/L — SIGNIFICANT CHANGE UP (ref 135–145)
SODIUM SERPL-SCNC: 140 MMOL/L — SIGNIFICANT CHANGE UP (ref 135–145)
SODIUM SERPL-SCNC: 140 MMOL/L — SIGNIFICANT CHANGE UP (ref 135–145)
SODIUM SERPL-SCNC: 141 MMOL/L — SIGNIFICANT CHANGE UP (ref 135–145)
WBC # BLD: 11.4 K/UL — HIGH (ref 3.8–10.5)
WBC # BLD: 11.7 K/UL — HIGH (ref 3.8–10.5)
WBC # BLD: 12.3 K/UL — HIGH (ref 3.8–10.5)
WBC # BLD: 8.2 K/UL — SIGNIFICANT CHANGE UP (ref 3.8–10.5)
WBC # FLD AUTO: 11.4 K/UL — HIGH (ref 3.8–10.5)
WBC # FLD AUTO: 11.7 K/UL — HIGH (ref 3.8–10.5)
WBC # FLD AUTO: 12.3 K/UL — HIGH (ref 3.8–10.5)
WBC # FLD AUTO: 8.2 K/UL — SIGNIFICANT CHANGE UP (ref 3.8–10.5)

## 2017-08-01 PROCEDURE — 99291 CRITICAL CARE FIRST HOUR: CPT

## 2017-08-01 PROCEDURE — 93010 ELECTROCARDIOGRAM REPORT: CPT

## 2017-08-01 PROCEDURE — 71010: CPT | Mod: 26

## 2017-08-01 RX ORDER — POLYETHYLENE GLYCOL 3350 17 G/17G
17 POWDER, FOR SOLUTION ORAL DAILY
Qty: 0 | Refills: 0 | Status: DISCONTINUED | OUTPATIENT
Start: 2017-08-01 | End: 2017-08-10

## 2017-08-01 RX ORDER — AMIODARONE HYDROCHLORIDE 400 MG/1
200 TABLET ORAL DAILY
Qty: 0 | Refills: 0 | Status: COMPLETED | OUTPATIENT
Start: 2017-08-05 | End: 2018-07-04

## 2017-08-01 RX ORDER — GLUCAGON INJECTION, SOLUTION 0.5 MG/.1ML
1 INJECTION, SOLUTION SUBCUTANEOUS ONCE
Qty: 0 | Refills: 0 | Status: DISCONTINUED | OUTPATIENT
Start: 2017-08-01 | End: 2017-08-10

## 2017-08-01 RX ORDER — DEXTROSE 50 % IN WATER 50 %
25 SYRINGE (ML) INTRAVENOUS ONCE
Qty: 0 | Refills: 0 | Status: DISCONTINUED | OUTPATIENT
Start: 2017-08-01 | End: 2017-08-10

## 2017-08-01 RX ORDER — FENTANYL CITRATE 50 UG/ML
12.5 INJECTION INTRAVENOUS ONCE
Qty: 0 | Refills: 0 | Status: DISCONTINUED | OUTPATIENT
Start: 2017-08-01 | End: 2017-08-01

## 2017-08-01 RX ORDER — AMIODARONE HYDROCHLORIDE 400 MG/1
400 TABLET ORAL EVERY 8 HOURS
Qty: 0 | Refills: 0 | Status: COMPLETED | OUTPATIENT
Start: 2017-08-01 | End: 2017-08-04

## 2017-08-01 RX ORDER — SODIUM CHLORIDE 9 MG/ML
500 INJECTION INTRAMUSCULAR; INTRAVENOUS; SUBCUTANEOUS ONCE
Qty: 0 | Refills: 0 | Status: COMPLETED | OUTPATIENT
Start: 2017-08-01 | End: 2017-08-01

## 2017-08-01 RX ORDER — KETOROLAC TROMETHAMINE 30 MG/ML
15 SYRINGE (ML) INJECTION ONCE
Qty: 0 | Refills: 0 | Status: DISCONTINUED | OUTPATIENT
Start: 2017-08-01 | End: 2017-08-01

## 2017-08-01 RX ORDER — ACETAMINOPHEN 500 MG
650 TABLET ORAL EVERY 6 HOURS
Qty: 0 | Refills: 0 | Status: DISCONTINUED | OUTPATIENT
Start: 2017-08-01 | End: 2017-08-10

## 2017-08-01 RX ORDER — DEXTROSE 50 % IN WATER 50 %
1 SYRINGE (ML) INTRAVENOUS ONCE
Qty: 0 | Refills: 0 | Status: DISCONTINUED | OUTPATIENT
Start: 2017-08-01 | End: 2017-08-10

## 2017-08-01 RX ORDER — OXYCODONE AND ACETAMINOPHEN 5; 325 MG/1; MG/1
1 TABLET ORAL EVERY 4 HOURS
Qty: 0 | Refills: 0 | Status: DISCONTINUED | OUTPATIENT
Start: 2017-08-01 | End: 2017-08-03

## 2017-08-01 RX ORDER — FAMOTIDINE 10 MG/ML
20 INJECTION INTRAVENOUS
Qty: 0 | Refills: 0 | Status: DISCONTINUED | OUTPATIENT
Start: 2017-08-01 | End: 2017-08-10

## 2017-08-01 RX ORDER — METOCLOPRAMIDE HCL 10 MG
10 TABLET ORAL ONCE
Qty: 0 | Refills: 0 | Status: COMPLETED | OUTPATIENT
Start: 2017-08-01 | End: 2017-08-01

## 2017-08-01 RX ORDER — SODIUM CHLORIDE 9 MG/ML
1000 INJECTION, SOLUTION INTRAVENOUS
Qty: 0 | Refills: 0 | Status: DISCONTINUED | OUTPATIENT
Start: 2017-08-01 | End: 2017-08-10

## 2017-08-01 RX ORDER — FENTANYL CITRATE 50 UG/ML
25 INJECTION INTRAVENOUS ONCE
Qty: 0 | Refills: 0 | Status: DISCONTINUED | OUTPATIENT
Start: 2017-08-01 | End: 2017-08-01

## 2017-08-01 RX ORDER — DOCUSATE SODIUM 100 MG
100 CAPSULE ORAL THREE TIMES A DAY
Qty: 0 | Refills: 0 | Status: DISCONTINUED | OUTPATIENT
Start: 2017-08-01 | End: 2017-08-10

## 2017-08-01 RX ORDER — INSULIN LISPRO 100/ML
VIAL (ML) SUBCUTANEOUS
Qty: 0 | Refills: 0 | Status: DISCONTINUED | OUTPATIENT
Start: 2017-08-01 | End: 2017-08-07

## 2017-08-01 RX ORDER — OXYCODONE AND ACETAMINOPHEN 5; 325 MG/1; MG/1
2 TABLET ORAL EVERY 6 HOURS
Qty: 0 | Refills: 0 | Status: DISCONTINUED | OUTPATIENT
Start: 2017-08-01 | End: 2017-08-07

## 2017-08-01 RX ORDER — ASPIRIN/CALCIUM CARB/MAGNESIUM 324 MG
81 TABLET ORAL DAILY
Qty: 0 | Refills: 0 | Status: DISCONTINUED | OUTPATIENT
Start: 2017-08-01 | End: 2017-08-10

## 2017-08-01 RX ORDER — LIDOCAINE 4 G/100G
1 CREAM TOPICAL DAILY
Qty: 0 | Refills: 0 | Status: DISCONTINUED | OUTPATIENT
Start: 2017-08-01 | End: 2017-08-10

## 2017-08-01 RX ORDER — MORPHINE SULFATE 50 MG/1
2 CAPSULE, EXTENDED RELEASE ORAL ONCE
Qty: 0 | Refills: 0 | Status: DISCONTINUED | OUTPATIENT
Start: 2017-08-01 | End: 2017-08-01

## 2017-08-01 RX ORDER — SENNA PLUS 8.6 MG/1
2 TABLET ORAL AT BEDTIME
Qty: 0 | Refills: 0 | Status: DISCONTINUED | OUTPATIENT
Start: 2017-08-01 | End: 2017-08-10

## 2017-08-01 RX ORDER — ONDANSETRON 8 MG/1
4 TABLET, FILM COATED ORAL ONCE
Qty: 0 | Refills: 0 | Status: COMPLETED | OUTPATIENT
Start: 2017-08-01 | End: 2017-08-01

## 2017-08-01 RX ORDER — METOPROLOL TARTRATE 50 MG
12.5 TABLET ORAL EVERY 12 HOURS
Qty: 0 | Refills: 0 | Status: DISCONTINUED | OUTPATIENT
Start: 2017-08-01 | End: 2017-08-02

## 2017-08-01 RX ORDER — DEXTROSE 50 % IN WATER 50 %
12.5 SYRINGE (ML) INTRAVENOUS ONCE
Qty: 0 | Refills: 0 | Status: DISCONTINUED | OUTPATIENT
Start: 2017-08-01 | End: 2017-08-10

## 2017-08-01 RX ORDER — POTASSIUM CHLORIDE 20 MEQ
20 PACKET (EA) ORAL ONCE
Qty: 0 | Refills: 0 | Status: COMPLETED | OUTPATIENT
Start: 2017-08-01 | End: 2017-08-01

## 2017-08-01 RX ORDER — TRAMADOL HYDROCHLORIDE 50 MG/1
50 TABLET ORAL EVERY 6 HOURS
Qty: 0 | Refills: 0 | Status: DISCONTINUED | OUTPATIENT
Start: 2017-08-01 | End: 2017-08-08

## 2017-08-01 RX ADMIN — Medication 100 MILLIEQUIVALENT(S): at 04:59

## 2017-08-01 RX ADMIN — Medication 1000 MILLIGRAM(S): at 05:00

## 2017-08-01 RX ADMIN — FAMOTIDINE 20 MILLIGRAM(S): 10 INJECTION INTRAVENOUS at 17:07

## 2017-08-01 RX ADMIN — LIDOCAINE 1 PATCH: 4 CREAM TOPICAL at 23:00

## 2017-08-01 RX ADMIN — MORPHINE SULFATE 2 MILLIGRAM(S): 50 CAPSULE, EXTENDED RELEASE ORAL at 20:59

## 2017-08-01 RX ADMIN — FENTANYL CITRATE 12.5 MICROGRAM(S): 50 INJECTION INTRAVENOUS at 10:05

## 2017-08-01 RX ADMIN — FENTANYL CITRATE 12.5 MICROGRAM(S): 50 INJECTION INTRAVENOUS at 09:50

## 2017-08-01 RX ADMIN — ONDANSETRON 4 MILLIGRAM(S): 8 TABLET, FILM COATED ORAL at 04:45

## 2017-08-01 RX ADMIN — HEPARIN SODIUM 5000 UNIT(S): 5000 INJECTION INTRAVENOUS; SUBCUTANEOUS at 14:00

## 2017-08-01 RX ADMIN — MORPHINE SULFATE 2 MILLIGRAM(S): 50 CAPSULE, EXTENDED RELEASE ORAL at 06:00

## 2017-08-01 RX ADMIN — AMIODARONE HYDROCHLORIDE 400 MILLIGRAM(S): 400 TABLET ORAL at 22:41

## 2017-08-01 RX ADMIN — MORPHINE SULFATE 2 MILLIGRAM(S): 50 CAPSULE, EXTENDED RELEASE ORAL at 05:00

## 2017-08-01 RX ADMIN — Medication 100 MILLIGRAM(S): at 22:41

## 2017-08-01 RX ADMIN — AMIODARONE HYDROCHLORIDE 400 MILLIGRAM(S): 400 TABLET ORAL at 09:19

## 2017-08-01 RX ADMIN — FENTANYL CITRATE 12.5 MICROGRAM(S): 50 INJECTION INTRAVENOUS at 19:35

## 2017-08-01 RX ADMIN — LIDOCAINE 1 PATCH: 4 CREAM TOPICAL at 11:12

## 2017-08-01 RX ADMIN — OXYCODONE AND ACETAMINOPHEN 1 TABLET(S): 5; 325 TABLET ORAL at 17:05

## 2017-08-01 RX ADMIN — MORPHINE SULFATE 2 MILLIGRAM(S): 50 CAPSULE, EXTENDED RELEASE ORAL at 04:45

## 2017-08-01 RX ADMIN — SODIUM CHLORIDE 1000 MILLILITER(S): 9 INJECTION INTRAMUSCULAR; INTRAVENOUS; SUBCUTANEOUS at 10:15

## 2017-08-01 RX ADMIN — OXYCODONE AND ACETAMINOPHEN 2 TABLET(S): 5; 325 TABLET ORAL at 23:00

## 2017-08-01 RX ADMIN — Medication 4: at 17:07

## 2017-08-01 RX ADMIN — Medication 100 MILLIGRAM(S): at 14:00

## 2017-08-01 RX ADMIN — Medication 15 MILLIGRAM(S): at 09:05

## 2017-08-01 RX ADMIN — FENTANYL CITRATE 12.5 MICROGRAM(S): 50 INJECTION INTRAVENOUS at 19:20

## 2017-08-01 RX ADMIN — Medication 100 MILLIGRAM(S): at 06:21

## 2017-08-01 RX ADMIN — Medication 400 MILLIGRAM(S): at 04:45

## 2017-08-01 RX ADMIN — Medication 2: at 12:22

## 2017-08-01 RX ADMIN — TRAMADOL HYDROCHLORIDE 50 MILLIGRAM(S): 50 TABLET ORAL at 17:49

## 2017-08-01 RX ADMIN — Medication 15 MILLIGRAM(S): at 09:20

## 2017-08-01 RX ADMIN — OXYCODONE AND ACETAMINOPHEN 1 TABLET(S): 5; 325 TABLET ORAL at 17:35

## 2017-08-01 RX ADMIN — Medication 2: at 22:41

## 2017-08-01 RX ADMIN — Medication 12.5 MILLIGRAM(S): at 18:00

## 2017-08-01 RX ADMIN — SENNA PLUS 2 TABLET(S): 8.6 TABLET ORAL at 22:41

## 2017-08-01 RX ADMIN — OXYCODONE AND ACETAMINOPHEN 2 TABLET(S): 5; 325 TABLET ORAL at 22:41

## 2017-08-01 RX ADMIN — MORPHINE SULFATE 2 MILLIGRAM(S): 50 CAPSULE, EXTENDED RELEASE ORAL at 02:16

## 2017-08-01 RX ADMIN — TRAMADOL HYDROCHLORIDE 50 MILLIGRAM(S): 50 TABLET ORAL at 19:03

## 2017-08-01 RX ADMIN — Medication 81 MILLIGRAM(S): at 11:12

## 2017-08-01 RX ADMIN — HEPARIN SODIUM 5000 UNIT(S): 5000 INJECTION INTRAVENOUS; SUBCUTANEOUS at 06:20

## 2017-08-01 RX ADMIN — HEPARIN SODIUM 5000 UNIT(S): 5000 INJECTION INTRAVENOUS; SUBCUTANEOUS at 22:40

## 2017-08-01 RX ADMIN — TAMSULOSIN HYDROCHLORIDE 0.4 MILLIGRAM(S): 0.4 CAPSULE ORAL at 22:41

## 2017-08-01 RX ADMIN — MORPHINE SULFATE 2 MILLIGRAM(S): 50 CAPSULE, EXTENDED RELEASE ORAL at 21:20

## 2017-08-01 RX ADMIN — MORPHINE SULFATE 2 MILLIGRAM(S): 50 CAPSULE, EXTENDED RELEASE ORAL at 06:15

## 2017-08-01 RX ADMIN — ATORVASTATIN CALCIUM 40 MILLIGRAM(S): 80 TABLET, FILM COATED ORAL at 22:41

## 2017-08-01 RX ADMIN — AMIODARONE HYDROCHLORIDE 400 MILLIGRAM(S): 400 TABLET ORAL at 13:41

## 2017-08-01 RX ADMIN — Medication 10 MILLIGRAM(S): at 06:20

## 2017-08-01 RX ADMIN — MORPHINE SULFATE 2 MILLIGRAM(S): 50 CAPSULE, EXTENDED RELEASE ORAL at 02:31

## 2017-08-01 NOTE — PHYSICAL THERAPY INITIAL EVALUATION ADULT - GENERAL OBSERVATIONS, REHAB EVAL
Patient received seated out of bed no acute distress +telemetry +6L O2 NC +3 blakes to wall suction +dupree +IV +Gabriela +temp PPM. Patient left as found all lines intact +call ace. RN Yaz Livingston present and aware.

## 2017-08-01 NOTE — PROGRESS NOTE ADULT - SUBJECTIVE AND OBJECTIVE BOX
POD #1 s/p Valve sparing root and hemiarch replacement for aortic root aneurysm    Extubated around 430AM this morning currently stable on 6 L NC  OOB to chair  Stable hemodynamics, Cleviprex weaned off @ 5AM  Minimal drain output  Short runs of SVT noted on tele this AM, of note pt has h/o AFib.       PMH :  Shortness of breath  Bronchitis  Paroxysmal atrial fibrillation  Congestive heart failure, unspecified congestive heart failure chronicity, unspecified congestive heart failure type  Metastasis  Prostate cancer  Benign prostatic hyperplasia, presence of lower urinary tract symptoms unspecified, unspecified morphology  Aortic root replacement, valve sparing  Collar bone fracture    ROS + mild discomfort  All other systems reviewed and negative.    ICU Vital Signs Last 24 Hrs  T(C): 36.3 (08-01-17 @ 04:58), Max: 37 (07-31-17 @ 17:00)  T(F): 97.4 (08-01-17 @ 04:58), Max: 98.6 (07-31-17 @ 17:00)  HR: 74 (08-01-17 @ 08:00) (62 - 86)  BP: 115/80 (07-31-17 @ 22:00) (115/80 - 115/80)  BP(mean): 99 (07-31-17 @ 22:00) (99 - 99)  ABP: 108/56 (08-01-17 @ 08:00) (104/64 - 140/84)  ABP(mean): 72 (08-01-17 @ 08:00) (72 - 104)  RR: 25 (08-01-17 @ 08:00) (14 - 29)  SpO2: 100% (08-01-17 @ 08:00) (96% - 100%)    I&O's Summary    31 Jul 2017 07:01  -  01 Aug 2017 07:00  --------------------------------------------------------  IN: 1232.3 mL / OUT: 1950 mL / NET: -717.7 mL    01 Aug 2017 07:01  -  01 Aug 2017 08:36  --------------------------------------------------------  IN: 10 mL / OUT: 100 mL / NET: -90 mL      Mode: AC/ CMV (Assist Control/ Continuous Mandatory Ventilation)  RR (machine): 16  TV (machine): 500  FiO2: 40  PEEP: 7  ITime: 1  MAP: 10  PIP: 22    ABG - ( 01 Aug 2017 04:51 )  pH: 7.39  /  pCO2: 35    /  pO2: 102   / HCO3: 21    / Base Excess: -3.2  /  SaO2: 98                              10.6   8.2   )-----------( 154      ( 01 Aug 2017 03:40 )             31.2     01 Aug 2017 03:40    140    |  105    |  18     ----------------------------<  96     4.0     |  23     |  1.00     Ca    9.1        01 Aug 2017 03:40  Phos  4.4       01 Aug 2017 03:40  Mg     2.5       01 Aug 2017 03:40    TPro  6.7    /  Alb  3.4    /  TBili  2.0    /  DBili  x      /  AST  99     /  ALT  37     /  AlkPhos  73     01 Aug 2017 03:40    PT/INR - ( 01 Aug 2017 03:40 )   PT: 12.9 sec;   INR: 1.16     PTT - ( 01 Aug 2017 03:40 )  PTT:24.8 sec    MEDICATIONS  (STANDING):  sodium chloride 0.45%. 1000 milliLiter(s) IV Continuous <Continuous>  heparin  Injectable 5000 Unit(s) SubCutaneous every 8 hours  ceFAZolin   IVPB 2000 milliGRAM(s) IV Intermittent every 8 hours  clevidipine Infusion 2 mG/Hr IV Continuous <Continuous>  bicalutamide 50 milliGRAM(s) Oral daily  tamsulosin 0.4 milliGRAM(s) Oral at bedtime  atorvastatin 40 milliGRAM(s) Oral at bedtime  insulin lispro (HumaLOG) corrective regimen sliding scale   SubCutaneous Before meals and at bedtime  famotidine    Tablet 20 milliGRAM(s) Oral two times a day  aspirin enteric coated 81 milliGRAM(s) Oral daily    PHYSICAL EXAM:  Gen : no acute distress  Respiratory: decreased in the bases  Cardiovascular: S1 and S2, RRR, no M/G/R  Gastrointestinal: BS+, soft, NT/ND  Extremities: No peripheral edema  Vascular: 2+ peripheral pulses

## 2017-08-01 NOTE — PHYSICAL THERAPY INITIAL EVALUATION ADULT - ADDITIONAL COMMENTS
patient reports he was independent for all mobility and ADLs, uses straight cane sometimes patient reports he uses straight cane sometimes; has home health aide 5 days a week for 4 hours/day

## 2017-08-01 NOTE — PHYSICAL THERAPY INITIAL EVALUATION ADULT - PERTINENT HX OF CURRENT PROBLEM, REHAB EVAL
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 Patient now presents to [facility] for elective surgery.

## 2017-08-02 LAB
ALBUMIN SERPL ELPH-MCNC: 3.3 G/DL — SIGNIFICANT CHANGE UP (ref 3.3–5)
ALBUMIN SERPL ELPH-MCNC: 3.6 G/DL — SIGNIFICANT CHANGE UP (ref 3.3–5)
ALP SERPL-CCNC: 55 U/L — SIGNIFICANT CHANGE UP (ref 40–120)
ALP SERPL-CCNC: 56 U/L — SIGNIFICANT CHANGE UP (ref 40–120)
ALT FLD-CCNC: 17 U/L — SIGNIFICANT CHANGE UP (ref 10–45)
ALT FLD-CCNC: 25 U/L — SIGNIFICANT CHANGE UP (ref 10–45)
ANION GAP SERPL CALC-SCNC: 11 MMOL/L — SIGNIFICANT CHANGE UP (ref 5–17)
ANION GAP SERPL CALC-SCNC: 11 MMOL/L — SIGNIFICANT CHANGE UP (ref 5–17)
ANION GAP SERPL CALC-SCNC: 12 MMOL/L — SIGNIFICANT CHANGE UP (ref 5–17)
APTT BLD: 27.8 SEC — SIGNIFICANT CHANGE UP (ref 27.5–37.4)
APTT BLD: 28.2 SEC — SIGNIFICANT CHANGE UP (ref 27.5–37.4)
APTT BLD: 28.3 SEC — SIGNIFICANT CHANGE UP (ref 27.5–37.4)
AST SERPL-CCNC: 60 U/L — HIGH (ref 10–40)
AST SERPL-CCNC: 93 U/L — HIGH (ref 10–40)
BASE EXCESS BLDA CALC-SCNC: 0.1 MMOL/L — SIGNIFICANT CHANGE UP (ref -2–3)
BILIRUB SERPL-MCNC: 0.6 MG/DL — SIGNIFICANT CHANGE UP (ref 0.2–1.2)
BILIRUB SERPL-MCNC: 0.8 MG/DL — SIGNIFICANT CHANGE UP (ref 0.2–1.2)
BUN SERPL-MCNC: 31 MG/DL — HIGH (ref 7–23)
BUN SERPL-MCNC: 34 MG/DL — HIGH (ref 7–23)
BUN SERPL-MCNC: 36 MG/DL — HIGH (ref 7–23)
CALCIUM SERPL-MCNC: 8.1 MG/DL — LOW (ref 8.4–10.5)
CALCIUM SERPL-MCNC: 8.6 MG/DL — SIGNIFICANT CHANGE UP (ref 8.4–10.5)
CALCIUM SERPL-MCNC: 8.8 MG/DL — SIGNIFICANT CHANGE UP (ref 8.4–10.5)
CHLORIDE SERPL-SCNC: 100 MMOL/L — SIGNIFICANT CHANGE UP (ref 96–108)
CHLORIDE SERPL-SCNC: 100 MMOL/L — SIGNIFICANT CHANGE UP (ref 96–108)
CHLORIDE SERPL-SCNC: 101 MMOL/L — SIGNIFICANT CHANGE UP (ref 96–108)
CO2 SERPL-SCNC: 21 MMOL/L — LOW (ref 22–31)
CO2 SERPL-SCNC: 22 MMOL/L — SIGNIFICANT CHANGE UP (ref 22–31)
CO2 SERPL-SCNC: 23 MMOL/L — SIGNIFICANT CHANGE UP (ref 22–31)
CREAT SERPL-MCNC: 1.2 MG/DL — SIGNIFICANT CHANGE UP (ref 0.5–1.3)
CREAT SERPL-MCNC: 1.3 MG/DL — SIGNIFICANT CHANGE UP (ref 0.5–1.3)
CREAT SERPL-MCNC: 1.5 MG/DL — HIGH (ref 0.5–1.3)
GAS PNL BLDA: SIGNIFICANT CHANGE UP
GLUCOSE SERPL-MCNC: 157 MG/DL — HIGH (ref 70–99)
GLUCOSE SERPL-MCNC: 161 MG/DL — HIGH (ref 70–99)
GLUCOSE SERPL-MCNC: 166 MG/DL — HIGH (ref 70–99)
HCO3 BLDA-SCNC: 24 MMOL/L — SIGNIFICANT CHANGE UP (ref 21–28)
HCT VFR BLD CALC: 25.5 % — LOW (ref 39–50)
HCT VFR BLD CALC: 26.2 % — LOW (ref 39–50)
HCT VFR BLD CALC: 27.5 % — LOW (ref 39–50)
HGB BLD-MCNC: 8.4 G/DL — LOW (ref 13–17)
HGB BLD-MCNC: 8.9 G/DL — LOW (ref 13–17)
HGB BLD-MCNC: 9.1 G/DL — LOW (ref 13–17)
INR BLD: 1.41 — HIGH (ref 0.88–1.16)
INR BLD: 1.43 — HIGH (ref 0.88–1.16)
LACTATE SERPL-SCNC: 1.1 MMOL/L — SIGNIFICANT CHANGE UP (ref 0.5–2)
LACTATE SERPL-SCNC: 1.2 MMOL/L — SIGNIFICANT CHANGE UP (ref 0.5–2)
MAGNESIUM SERPL-MCNC: 2.3 MG/DL — SIGNIFICANT CHANGE UP (ref 1.6–2.6)
MAGNESIUM SERPL-MCNC: 2.4 MG/DL — SIGNIFICANT CHANGE UP (ref 1.6–2.6)
MAGNESIUM SERPL-MCNC: 2.4 MG/DL — SIGNIFICANT CHANGE UP (ref 1.6–2.6)
MCHC RBC-ENTMCNC: 30.6 PG — SIGNIFICANT CHANGE UP (ref 27–34)
MCHC RBC-ENTMCNC: 31.3 PG — SIGNIFICANT CHANGE UP (ref 27–34)
MCHC RBC-ENTMCNC: 31.4 PG — SIGNIFICANT CHANGE UP (ref 27–34)
MCHC RBC-ENTMCNC: 32.9 G/DL — SIGNIFICANT CHANGE UP (ref 32–36)
MCHC RBC-ENTMCNC: 33.1 G/DL — SIGNIFICANT CHANGE UP (ref 32–36)
MCHC RBC-ENTMCNC: 34 G/DL — SIGNIFICANT CHANGE UP (ref 32–36)
MCV RBC AUTO: 90 FL — SIGNIFICANT CHANGE UP (ref 80–100)
MCV RBC AUTO: 94.8 FL — SIGNIFICANT CHANGE UP (ref 80–100)
MCV RBC AUTO: 95.1 FL — SIGNIFICANT CHANGE UP (ref 80–100)
PCO2 BLDA: 36 MMHG — SIGNIFICANT CHANGE UP (ref 35–48)
PH BLDA: 7.44 — SIGNIFICANT CHANGE UP (ref 7.35–7.45)
PHOSPHATE SERPL-MCNC: 1.8 MG/DL — LOW (ref 2.5–4.5)
PHOSPHATE SERPL-MCNC: 3.5 MG/DL — SIGNIFICANT CHANGE UP (ref 2.5–4.5)
PLATELET # BLD AUTO: 104 K/UL — LOW (ref 150–400)
PLATELET # BLD AUTO: 110 K/UL — LOW (ref 150–400)
PLATELET # BLD AUTO: 93 K/UL — LOW (ref 150–400)
PO2 BLDA: 103 MMHG — SIGNIFICANT CHANGE UP (ref 83–108)
POTASSIUM SERPL-MCNC: 4 MMOL/L — SIGNIFICANT CHANGE UP (ref 3.5–5.3)
POTASSIUM SERPL-MCNC: 4.3 MMOL/L — SIGNIFICANT CHANGE UP (ref 3.5–5.3)
POTASSIUM SERPL-MCNC: 4.3 MMOL/L — SIGNIFICANT CHANGE UP (ref 3.5–5.3)
POTASSIUM SERPL-SCNC: 4 MMOL/L — SIGNIFICANT CHANGE UP (ref 3.5–5.3)
POTASSIUM SERPL-SCNC: 4.3 MMOL/L — SIGNIFICANT CHANGE UP (ref 3.5–5.3)
POTASSIUM SERPL-SCNC: 4.3 MMOL/L — SIGNIFICANT CHANGE UP (ref 3.5–5.3)
PROT SERPL-MCNC: 6.2 G/DL — SIGNIFICANT CHANGE UP (ref 6–8.3)
PROT SERPL-MCNC: 6.5 G/DL — SIGNIFICANT CHANGE UP (ref 6–8.3)
PROTHROM AB SERPL-ACNC: 15.8 SEC — HIGH (ref 9.8–12.7)
PROTHROM AB SERPL-ACNC: 16 SEC — HIGH (ref 9.8–12.7)
RBC # BLD: 2.68 M/UL — LOW (ref 4.2–5.8)
RBC # BLD: 2.9 M/UL — LOW (ref 4.2–5.8)
RBC # BLD: 2.91 M/UL — LOW (ref 4.2–5.8)
RBC # FLD: 14.5 % — SIGNIFICANT CHANGE UP (ref 10.3–16.9)
RBC # FLD: 14.9 % — SIGNIFICANT CHANGE UP (ref 10.3–16.9)
RBC # FLD: 15.3 % — SIGNIFICANT CHANGE UP (ref 10.3–16.9)
SAO2 % BLDA: 98 % — SIGNIFICANT CHANGE UP (ref 95–100)
SODIUM SERPL-SCNC: 132 MMOL/L — LOW (ref 135–145)
SODIUM SERPL-SCNC: 134 MMOL/L — LOW (ref 135–145)
SODIUM SERPL-SCNC: 135 MMOL/L — SIGNIFICANT CHANGE UP (ref 135–145)
WBC # BLD: 11 K/UL — HIGH (ref 3.8–10.5)
WBC # BLD: 11.3 K/UL — HIGH (ref 3.8–10.5)
WBC # BLD: 12.2 K/UL — HIGH (ref 3.8–10.5)
WBC # FLD AUTO: 11 K/UL — HIGH (ref 3.8–10.5)
WBC # FLD AUTO: 11.3 K/UL — HIGH (ref 3.8–10.5)
WBC # FLD AUTO: 12.2 K/UL — HIGH (ref 3.8–10.5)

## 2017-08-02 PROCEDURE — 93010 ELECTROCARDIOGRAM REPORT: CPT

## 2017-08-02 PROCEDURE — 99291 CRITICAL CARE FIRST HOUR: CPT

## 2017-08-02 PROCEDURE — 71010: CPT | Mod: 26

## 2017-08-02 PROCEDURE — 93306 TTE W/DOPPLER COMPLETE: CPT | Mod: 26

## 2017-08-02 PROCEDURE — 99292 CRITICAL CARE ADDL 30 MIN: CPT

## 2017-08-02 RX ORDER — METOCLOPRAMIDE HCL 10 MG
10 TABLET ORAL ONCE
Qty: 0 | Refills: 0 | Status: COMPLETED | OUTPATIENT
Start: 2017-08-02 | End: 2017-08-02

## 2017-08-02 RX ORDER — MORPHINE SULFATE 50 MG/1
2 CAPSULE, EXTENDED RELEASE ORAL ONCE
Qty: 0 | Refills: 0 | Status: DISCONTINUED | OUTPATIENT
Start: 2017-08-02 | End: 2017-08-02

## 2017-08-02 RX ORDER — ALBUMIN HUMAN 25 %
250 VIAL (ML) INTRAVENOUS ONCE
Qty: 0 | Refills: 0 | Status: COMPLETED | OUTPATIENT
Start: 2017-08-02 | End: 2017-08-02

## 2017-08-02 RX ORDER — ACETAMINOPHEN 500 MG
1000 TABLET ORAL ONCE
Qty: 0 | Refills: 0 | Status: COMPLETED | OUTPATIENT
Start: 2017-08-02 | End: 2017-08-02

## 2017-08-02 RX ORDER — FENTANYL CITRATE 50 UG/ML
12.5 INJECTION INTRAVENOUS ONCE
Qty: 0 | Refills: 0 | Status: DISCONTINUED | OUTPATIENT
Start: 2017-08-02 | End: 2017-08-02

## 2017-08-02 RX ORDER — BICALUTAMIDE 50 MG/1
50 TABLET, FILM COATED ORAL DAILY
Qty: 0 | Refills: 0 | Status: DISCONTINUED | OUTPATIENT
Start: 2017-08-02 | End: 2017-08-10

## 2017-08-02 RX ADMIN — Medication 100 MILLIGRAM(S): at 21:50

## 2017-08-02 RX ADMIN — Medication 12.5 MILLIGRAM(S): at 05:45

## 2017-08-02 RX ADMIN — BICALUTAMIDE 50 MILLIGRAM(S): 50 TABLET, FILM COATED ORAL at 15:29

## 2017-08-02 RX ADMIN — MORPHINE SULFATE 2 MILLIGRAM(S): 50 CAPSULE, EXTENDED RELEASE ORAL at 02:20

## 2017-08-02 RX ADMIN — AMIODARONE HYDROCHLORIDE 400 MILLIGRAM(S): 400 TABLET ORAL at 14:05

## 2017-08-02 RX ADMIN — Medication 125 MILLILITER(S): at 02:05

## 2017-08-02 RX ADMIN — OXYCODONE AND ACETAMINOPHEN 2 TABLET(S): 5; 325 TABLET ORAL at 21:50

## 2017-08-02 RX ADMIN — MORPHINE SULFATE 2 MILLIGRAM(S): 50 CAPSULE, EXTENDED RELEASE ORAL at 02:40

## 2017-08-02 RX ADMIN — SENNA PLUS 2 TABLET(S): 8.6 TABLET ORAL at 21:49

## 2017-08-02 RX ADMIN — HEPARIN SODIUM 5000 UNIT(S): 5000 INJECTION INTRAVENOUS; SUBCUTANEOUS at 05:45

## 2017-08-02 RX ADMIN — Medication 1000 MILLIGRAM(S): at 19:58

## 2017-08-02 RX ADMIN — TRAMADOL HYDROCHLORIDE 50 MILLIGRAM(S): 50 TABLET ORAL at 00:28

## 2017-08-02 RX ADMIN — Medication 100 MILLIGRAM(S): at 05:46

## 2017-08-02 RX ADMIN — Medication 81 MILLIGRAM(S): at 11:47

## 2017-08-02 RX ADMIN — AMIODARONE HYDROCHLORIDE 400 MILLIGRAM(S): 400 TABLET ORAL at 21:49

## 2017-08-02 RX ADMIN — Medication 125 MILLILITER(S): at 22:00

## 2017-08-02 RX ADMIN — Medication 2: at 06:55

## 2017-08-02 RX ADMIN — OXYCODONE AND ACETAMINOPHEN 2 TABLET(S): 5; 325 TABLET ORAL at 22:10

## 2017-08-02 RX ADMIN — ATORVASTATIN CALCIUM 40 MILLIGRAM(S): 80 TABLET, FILM COATED ORAL at 21:50

## 2017-08-02 RX ADMIN — Medication 125 MILLILITER(S): at 21:00

## 2017-08-02 RX ADMIN — LIDOCAINE 1 PATCH: 4 CREAM TOPICAL at 23:00

## 2017-08-02 RX ADMIN — Medication 2: at 12:32

## 2017-08-02 RX ADMIN — Medication 10 MILLIGRAM(S): at 05:45

## 2017-08-02 RX ADMIN — TRAMADOL HYDROCHLORIDE 50 MILLIGRAM(S): 50 TABLET ORAL at 01:00

## 2017-08-02 RX ADMIN — Medication 400 MILLIGRAM(S): at 19:35

## 2017-08-02 RX ADMIN — FAMOTIDINE 20 MILLIGRAM(S): 10 INJECTION INTRAVENOUS at 18:08

## 2017-08-02 RX ADMIN — MORPHINE SULFATE 2 MILLIGRAM(S): 50 CAPSULE, EXTENDED RELEASE ORAL at 22:50

## 2017-08-02 RX ADMIN — FENTANYL CITRATE 12.5 MICROGRAM(S): 50 INJECTION INTRAVENOUS at 10:15

## 2017-08-02 RX ADMIN — FENTANYL CITRATE 12.5 MICROGRAM(S): 50 INJECTION INTRAVENOUS at 10:00

## 2017-08-02 RX ADMIN — Medication 400 MILLIGRAM(S): at 05:47

## 2017-08-02 RX ADMIN — TAMSULOSIN HYDROCHLORIDE 0.4 MILLIGRAM(S): 0.4 CAPSULE ORAL at 21:49

## 2017-08-02 RX ADMIN — Medication 2: at 21:49

## 2017-08-02 RX ADMIN — Medication 100 MILLIGRAM(S): at 14:06

## 2017-08-02 RX ADMIN — HEPARIN SODIUM 5000 UNIT(S): 5000 INJECTION INTRAVENOUS; SUBCUTANEOUS at 14:05

## 2017-08-02 RX ADMIN — Medication 100 MILLIGRAM(S): at 05:45

## 2017-08-02 RX ADMIN — AMIODARONE HYDROCHLORIDE 400 MILLIGRAM(S): 400 TABLET ORAL at 05:46

## 2017-08-02 RX ADMIN — HEPARIN SODIUM 5000 UNIT(S): 5000 INJECTION INTRAVENOUS; SUBCUTANEOUS at 21:49

## 2017-08-02 RX ADMIN — LIDOCAINE 1 PATCH: 4 CREAM TOPICAL at 11:47

## 2017-08-02 RX ADMIN — TRAMADOL HYDROCHLORIDE 50 MILLIGRAM(S): 50 TABLET ORAL at 22:30

## 2017-08-02 RX ADMIN — Medication 1000 MILLIGRAM(S): at 06:05

## 2017-08-02 RX ADMIN — FAMOTIDINE 20 MILLIGRAM(S): 10 INJECTION INTRAVENOUS at 05:45

## 2017-08-02 RX ADMIN — TRAMADOL HYDROCHLORIDE 50 MILLIGRAM(S): 50 TABLET ORAL at 22:11

## 2017-08-02 RX ADMIN — MORPHINE SULFATE 2 MILLIGRAM(S): 50 CAPSULE, EXTENDED RELEASE ORAL at 22:30

## 2017-08-02 NOTE — PROGRESS NOTE ADULT - ASSESSMENT
70yo with history of metastatic prostate CA, cardiomyopathy with EF 40%, PAF on recent imaging for cancer follow up diagnosed with Aortic root aneurysm.  s/p Valve sparing aortic root and hemiarch replacement.    Overall doing well    Problems  1. s/p complex aortic surgery  2. SVTs, H/o AFib  3. Cardiomyopathy  4. Metastatic prostate CA    Plan  Neuro -- Pain control  CVS - hemodynamic support, monitor for arrhythmia.  low dose BB , Amio for SVTs   chest tube management  will follow up on ECHO for recurrent orthostasis  ASA prior to discharge  Pulm - IS, ambulation   GI - GI proph   - monitor UOP  Endo - glycemic control  Heme - high risk for VTE with underlying cancer and recent surgery. SCDs, Heparin for DVT proph  Will resume  ID - periop antibiotics.       Critical post op.    Critical care time spent 40 min

## 2017-08-02 NOTE — PROGRESS NOTE ADULT - SUBJECTIVE AND OBJECTIVE BOX
POD #2 s/p Valve sparing root and hemiarch replacement for aortic root aneurysm    Continues to complain of orthostasis  yesterday afternoon had mild lactic acidosis  Echo ordered this morning.   pain improving       PMH :  Shortness of breath  Bronchitis  Paroxysmal atrial fibrillation  Congestive heart failure, unspecified congestive heart failure chronicity, unspecified congestive heart failure type  Metastasis  Prostate cancer  Benign prostatic hyperplasia, presence of lower urinary tract symptoms unspecified, unspecified morphology  Aortic root aneurysm  Aortic root replacement, valve sparing  Collar bone fracture    ROS  All other systems reviewed and negative.    ICU Vital Signs Last 24 Hrs  T(C): 35.7 (08-02-17 @ 09:00), Max: 37.4 (08-01-17 @ 16:50)  T(F): 96.3 (08-02-17 @ 09:00), Max: 99.4 (08-01-17 @ 16:50)  HR: 66 (08-02-17 @ 12:00) (64 - 78)  ABP: 122/60 (08-02-17 @ 12:00) (110/106 - 148/64)  ABP(mean): 78 (08-02-17 @ 12:00) (74 - 108)  RR: 16 (08-02-17 @ 12:00) (16 - 33)  SpO2: 99% (08-02-17 @ 12:00) (93% - 100%)    I&O's Summary    01 Aug 2017 07:01  -  02 Aug 2017 07:00  --------------------------------------------------------  IN: 2120 mL / OUT: 1785 mL / NET: 335 mL    02 Aug 2017 07:01  -  02 Aug 2017 12:18  --------------------------------------------------------  IN: 240 mL / OUT: 380 mL / NET: -140 mL        ABG - ( 02 Aug 2017 02:32 )  pH: 7.45  /  pCO2: 34    /  pO2: 122   / HCO3: 23    / Base Excess: -0.7  /  SaO2: 98                            8.9    11.0  )-----------( 110      ( 02 Aug 2017 02:39 )             26.2     02 Aug 2017 02:39    135    |  101    |  36     ----------------------------<  157    4.3     |  22     |  1.50     Ca    8.6        02 Aug 2017 02:39  Phos  3.5       02 Aug 2017 02:39  Mg     2.4       02 Aug 2017 02:39    TPro  6.5    /  Alb  3.6    /  TBili  0.8    /  DBili  x      /  AST  93     /  ALT  25     /  AlkPhos  55     02 Aug 2017 02:39    PT/INR - ( 02 Aug 2017 02:39 )   PT: 15.8 sec;   INR: 1.41          PTT - ( 02 Aug 2017 02:39 )  PTT:28.2 sec  MEDICATIONS  (STANDING):  sodium chloride 0.45%. 1000 milliLiter(s) IV Continuous <Continuous>  heparin  Injectable 5000 Unit(s) SubCutaneous every 8 hours  tamsulosin 0.4 milliGRAM(s) Oral at bedtime  atorvastatin 40 milliGRAM(s) Oral at bedtime  insulin lispro (HumaLOG) corrective regimen sliding scale   SubCutaneous Before meals and at bedtime  famotidine    Tablet 20 milliGRAM(s) Oral two times a day  aspirin enteric coated 81 milliGRAM(s) Oral daily  metoprolol 12.5 milliGRAM(s) Oral every 12 hours  amiodarone    Tablet 400 milliGRAM(s) Oral every 8 hours  lidocaine   Patch 1 Patch Transdermal daily  docusate sodium 100 milliGRAM(s) Oral three times a day  senna 2 Tablet(s) Oral at bedtime    PHYSICAL EXAM:  Gen : no acute distress  Respiratory: decreased in the bases  Cardiovascular: S1 and S2, RRR, no M/G/R  Gastrointestinal: BS+, soft, NT/ND  Extremities: No peripheral edema  Vascular: 2+ peripheral pulses  Neurological: A/O x 3, no focal deficits  Incision: clean dry/ no sign of infection  Lines: no sign of infection

## 2017-08-02 NOTE — PROGRESS NOTE ADULT - SUBJECTIVE AND OBJECTIVE BOX
CTICU  CRITICAL  CARE  attending     Hand off received 					   Pertinent clinical, laboratory, radiographic, hemodynamic, echocardiographic, respiratory data, microbiologic data and chart were reviewed and analyzed frequently throughout the course of the day and night  Patient seen and examined with CTS/ SH attending at bedside  Pt is a 71y , Male with HTN, paroxysmal atrial fibrillation, H/O metastatic  prostate CA, dilated aortic root and AI.      FAMILY HISTORY:  Family history of cancer in sister (Sibling)  Family history of diabetes mellitus (Mother)  PAST MEDICAL & SURGICAL HISTORY:  Shortness of breath  Bronchitis  Paroxysmal atrial fibrillation  Congestive heart failure, unspecified congestive heart failure chronicity, unspecified congestive heart failure type  Metastasis  Prostate cancer  Benign prostatic hyperplasia, presence of lower urinary tract symptoms unspecified, unspecified morphology  Collar bone fracture    Patient is a 71y old  Male who presents with a chief complaint of aortic Aneurysm (26 Jul 2017 09:51)      14 system review was unremarkable  acute changes include acute respiratory failure  Vital signs, hemodynamic and respiratory parameters were reviewed from the bedside nursing flowsheet.  ICU Vital Signs Last 24 Hrs  T(C): 36.1 (02 Aug 2017 20:50), Max: 37 (02 Aug 2017 16:42)  T(F): 97 (02 Aug 2017 20:50), Max: 98.6 (02 Aug 2017 16:42)  HR: 70 (02 Aug 2017 22:00) (64 - 78)  BP: 125/43 (02 Aug 2017 11:15) (125/43 - 125/43)  BP(mean): 88 (02 Aug 2017 11:15) (88 - 88)  ABP: 136/68 (02 Aug 2017 22:00) (110/48 - 148/64)  ABP(mean): 90 (02 Aug 2017 22:00) (66 - 108)  RR: 30 (02 Aug 2017 22:00) (15 - 33)  SpO2: 88% (02 Aug 2017 22:00) (88% - 100%)    Adult Advanced Hemodynamics Last 24 Hrs  CVP(mm Hg): 20 (02 Aug 2017 22:00) (6 - 20)  CVP(cm H2O): --  CO: --  CI: --  PA: --  PA(mean): --  PCWP: --  SVR: --  SVRI: --  PVR: --  PVRI: --, ABG - ( 02 Aug 2017 21:12 )  pH: 7.47  /  pCO2: 32    /  pO2: 80    / HCO3: 22    / Base Excess: -0.9  /  SaO2: 96                  Intake and output was reviewed and the fluid balance was calculated  Daily     Daily   I&O's Summary    01 Aug 2017 07:01  -  02 Aug 2017 07:00  --------------------------------------------------------  IN: 2120 mL / OUT: 1785 mL / NET: 335 mL    02 Aug 2017 07:01  -  02 Aug 2017 22:45  --------------------------------------------------------  IN: 1060 mL / OUT: 1100 mL / NET: -40 mL        All lines and drain sites were assessed  Glycemic trend was reviewed CAPILLARY BLOOD GLUCOSE: 179 (02 Aug 2017 22:00)        NEURO: No acute change in mental status.  CVS: S1, S2, NoS3.  RESPIRATORY: Auscultation of the chest reveals equal breath sounds.  GI: Abdomen is soft. No  tenderness. + Bowel sounds.  Extremities are warm and well perfused  Wounds appear clean and unremarkable.  VASCULAR: + Distal pulses.  Antibiotics are perioperative cefazolin.    labs  CBC Full  -  ( 02 Aug 2017 21:19 )  WBC Count : 11.3 K/uL  Hemoglobin : 8.4 g/dL  Hematocrit : 25.5 %  Platelet Count - Automated : 93 K/uL  Mean Cell Volume : 95.1 fL  Mean Cell Hemoglobin : 31.3 pg  Mean Cell Hemoglobin Concentration : 32.9 g/dL  Auto Neutrophil # : x  Auto Lymphocyte # : x  Auto Monocyte # : x  Auto Eosinophil # : x  Auto Basophil # : x  Auto Neutrophil % : x  Auto Lymphocyte % : x  Auto Monocyte % : x  Auto Eosinophil % : x  Auto Basophil % : x    08-02    132<L>  |  100  |  31<H>  ----------------------------<  166<H>  4.0   |  21<L>  |  1.20    Ca    8.1<L>      02 Aug 2017 21:19  Phos  1.8     08-02  Mg     2.3     08-02    TPro  6.2  /  Alb  3.3  /  TBili  0.6  /  DBili  x   /  AST  60<H>  /  ALT  17  /  AlkPhos  56  08-02    PT/INR - ( 02 Aug 2017 21:19 )   PT: 16.0 sec;   INR: 1.43          PTT - ( 02 Aug 2017 21:19 )  PTT:27.8 sec  The current medications were reviewed   MEDICATIONS  (STANDING):  sodium chloride 0.45%. 1000 milliLiter(s) (10 mL/Hr) IV Continuous <Continuous>  heparin  Injectable 5000 Unit(s) SubCutaneous every 8 hours  tamsulosin 0.4 milliGRAM(s) Oral at bedtime  atorvastatin 40 milliGRAM(s) Oral at bedtime  insulin lispro (HumaLOG) corrective regimen sliding scale   SubCutaneous Before meals and at bedtime  dextrose 5%. 1000 milliLiter(s) (50 mL/Hr) IV Continuous <Continuous>  dextrose 50% Injectable 12.5 Gram(s) IV Push once  dextrose 50% Injectable 25 Gram(s) IV Push once  dextrose 50% Injectable 25 Gram(s) IV Push once  famotidine    Tablet 20 milliGRAM(s) Oral two times a day  aspirin enteric coated 81 milliGRAM(s) Oral daily  amiodarone    Tablet 400 milliGRAM(s) Oral every 8 hours  lidocaine   Patch 1 Patch Transdermal daily  docusate sodium 100 milliGRAM(s) Oral three times a day  senna 2 Tablet(s) Oral at bedtime  bicalutamide 50 milliGRAM(s) Oral daily    MEDICATIONS  (PRN):  dextrose Gel 1 Dose(s) Oral once PRN Blood Glucose LESS THAN 70 milliGRAM(s)/deciliter  glucagon  Injectable 1 milliGRAM(s) IntraMuscular once PRN Glucose LESS THAN 70 milligrams/deciliter  acetaminophen   Tablet. 650 milliGRAM(s) Oral every 6 hours PRN Mild Pain (1 - 3)  oxyCODONE    5 mG/acetaminophen 325 mG 1 Tablet(s) Oral every 4 hours PRN Moderate Pain (4 - 6)  traMADol 50 milliGRAM(s) Oral every 6 hours PRN Severe Pain (7 - 10)  polyethylene glycol 3350 17 Gram(s) Oral daily PRN Constipation  oxyCODONE    5 mG/acetaminophen 325 mG 2 Tablet(s) Oral every 6 hours PRN Severe Pain (7 - 10)       PROBLEM LIST/ ASSESSMENT:  HEALTH ISSUES - PROBLEM Dx:         Patient is a 71y old  Male who presents with aortic Aneurysm   S/P valve sparing aortic root replacement.  S/P replacement of ascending aorta and hemiarch.  Hemodynamically stable.  Fair urine output.  BUN/Cr: 31/1.2.  Mild metabolic acidosis.  Good oxygenation.    My plan includes :  Continue amiodarone.  Hold beta blocker (+Orthostasis).  Monitor for arrhythmias and monitor parameters for organ perfusion  Monitor neurologic status  Head of the bed should remain elevated to 45 deg .   Chest PT and IS will be encouraged  Monitor adequacy of oxygenation and ventilation and attempt to wean oxygen  Nutritional goals will be met using po eventually , ensure adequate caloric intake and monitor the same  Stress ulcer and VTE prophylaxis will be achieved    Glycemic control is satisfactory  Electrolytes have been repleted as necessary and wound care has been carried out. Pain control has been achieved.   Aggressive physical therapy and early mobility and ambulation goals will be met   The family was updated about the course and plan  CRITICAL CARE TIME SPENT in evaluation and management, reassessments, review and interpretation of labs and x-rays, ventilator and hemodynamic management, formulating a plan and coordinating care: ___30____ MIN.  Time does not include procedural time.  CTICU ATTENDING     					    Matias Hull MD

## 2017-08-03 LAB
ALBUMIN SERPL ELPH-MCNC: 3.2 G/DL — LOW (ref 3.3–5)
ALBUMIN SERPL ELPH-MCNC: 3.4 G/DL — SIGNIFICANT CHANGE UP (ref 3.3–5)
ALP SERPL-CCNC: 53 U/L — SIGNIFICANT CHANGE UP (ref 40–120)
ALP SERPL-CCNC: 55 U/L — SIGNIFICANT CHANGE UP (ref 40–120)
ALT FLD-CCNC: 16 U/L — SIGNIFICANT CHANGE UP (ref 10–45)
ALT FLD-CCNC: 17 U/L — SIGNIFICANT CHANGE UP (ref 10–45)
ANION GAP SERPL CALC-SCNC: 11 MMOL/L — SIGNIFICANT CHANGE UP (ref 5–17)
ANION GAP SERPL CALC-SCNC: 13 MMOL/L — SIGNIFICANT CHANGE UP (ref 5–17)
APTT BLD: 28.8 SEC — SIGNIFICANT CHANGE UP (ref 27.5–37.4)
APTT BLD: 30.4 SEC — SIGNIFICANT CHANGE UP (ref 27.5–37.4)
AST SERPL-CCNC: 52 U/L — HIGH (ref 10–40)
AST SERPL-CCNC: 57 U/L — HIGH (ref 10–40)
BILIRUB DIRECT SERPL-MCNC: 0.3 MG/DL — HIGH (ref 0–0.2)
BILIRUB INDIRECT FLD-MCNC: 1.1 MG/DL — HIGH (ref 0.2–1)
BILIRUB SERPL-MCNC: 0.7 MG/DL — SIGNIFICANT CHANGE UP (ref 0.2–1.2)
BILIRUB SERPL-MCNC: 1.4 MG/DL — HIGH (ref 0.2–1.2)
BLD GP AB SCN SERPL QL: NEGATIVE — SIGNIFICANT CHANGE UP
BUN SERPL-MCNC: 23 MG/DL — SIGNIFICANT CHANGE UP (ref 7–23)
BUN SERPL-MCNC: 27 MG/DL — HIGH (ref 7–23)
CALCIUM SERPL-MCNC: 8.4 MG/DL — SIGNIFICANT CHANGE UP (ref 8.4–10.5)
CALCIUM SERPL-MCNC: 8.8 MG/DL — SIGNIFICANT CHANGE UP (ref 8.4–10.5)
CHLORIDE SERPL-SCNC: 100 MMOL/L — SIGNIFICANT CHANGE UP (ref 96–108)
CHLORIDE SERPL-SCNC: 99 MMOL/L — SIGNIFICANT CHANGE UP (ref 96–108)
CO2 SERPL-SCNC: 21 MMOL/L — LOW (ref 22–31)
CO2 SERPL-SCNC: 23 MMOL/L — SIGNIFICANT CHANGE UP (ref 22–31)
CREAT SERPL-MCNC: 0.9 MG/DL — SIGNIFICANT CHANGE UP (ref 0.5–1.3)
CREAT SERPL-MCNC: 1.1 MG/DL — SIGNIFICANT CHANGE UP (ref 0.5–1.3)
GAS PNL BLDA: SIGNIFICANT CHANGE UP
GLUCOSE SERPL-MCNC: 138 MG/DL — HIGH (ref 70–99)
GLUCOSE SERPL-MCNC: 160 MG/DL — HIGH (ref 70–99)
HCT VFR BLD CALC: 24.4 % — LOW (ref 39–50)
HCT VFR BLD CALC: 29.5 % — LOW (ref 39–50)
HGB BLD-MCNC: 8 G/DL — LOW (ref 13–17)
HGB BLD-MCNC: 9.7 G/DL — LOW (ref 13–17)
INR BLD: 1.3 — HIGH (ref 0.88–1.16)
INR BLD: 1.35 — HIGH (ref 0.88–1.16)
LACTATE SERPL-SCNC: 1 MMOL/L — SIGNIFICANT CHANGE UP (ref 0.5–2)
MAGNESIUM SERPL-MCNC: 2.2 MG/DL — SIGNIFICANT CHANGE UP (ref 1.6–2.6)
MCHC RBC-ENTMCNC: 31 PG — SIGNIFICANT CHANGE UP (ref 27–34)
MCHC RBC-ENTMCNC: 31.4 PG — SIGNIFICANT CHANGE UP (ref 27–34)
MCHC RBC-ENTMCNC: 32.8 G/DL — SIGNIFICANT CHANGE UP (ref 32–36)
MCHC RBC-ENTMCNC: 32.9 G/DL — SIGNIFICANT CHANGE UP (ref 32–36)
MCV RBC AUTO: 94.2 FL — SIGNIFICANT CHANGE UP (ref 80–100)
MCV RBC AUTO: 95.7 FL — SIGNIFICANT CHANGE UP (ref 80–100)
PHOSPHATE SERPL-MCNC: 3.1 MG/DL — SIGNIFICANT CHANGE UP (ref 2.5–4.5)
PLATELET # BLD AUTO: 77 K/UL — LOW (ref 150–400)
PLATELET # BLD AUTO: 94 K/UL — LOW (ref 150–400)
POTASSIUM SERPL-MCNC: 3.9 MMOL/L — SIGNIFICANT CHANGE UP (ref 3.5–5.3)
POTASSIUM SERPL-MCNC: 4 MMOL/L — SIGNIFICANT CHANGE UP (ref 3.5–5.3)
POTASSIUM SERPL-SCNC: 3.9 MMOL/L — SIGNIFICANT CHANGE UP (ref 3.5–5.3)
POTASSIUM SERPL-SCNC: 4 MMOL/L — SIGNIFICANT CHANGE UP (ref 3.5–5.3)
PROT SERPL-MCNC: 6.1 G/DL — SIGNIFICANT CHANGE UP (ref 6–8.3)
PROT SERPL-MCNC: 6.5 G/DL — SIGNIFICANT CHANGE UP (ref 6–8.3)
PROTHROM AB SERPL-ACNC: 14.5 SEC — HIGH (ref 9.8–12.7)
PROTHROM AB SERPL-ACNC: 15.1 SEC — HIGH (ref 9.8–12.7)
RBC # BLD: 2.55 M/UL — LOW (ref 4.2–5.8)
RBC # BLD: 3.13 M/UL — LOW (ref 4.2–5.8)
RBC # FLD: 14.2 % — SIGNIFICANT CHANGE UP (ref 10.3–16.9)
RBC # FLD: 14.3 % — SIGNIFICANT CHANGE UP (ref 10.3–16.9)
RH IG SCN BLD-IMP: POSITIVE — SIGNIFICANT CHANGE UP
SODIUM SERPL-SCNC: 133 MMOL/L — LOW (ref 135–145)
SODIUM SERPL-SCNC: 134 MMOL/L — LOW (ref 135–145)
WBC # BLD: 10.1 K/UL — SIGNIFICANT CHANGE UP (ref 3.8–10.5)
WBC # BLD: 11 K/UL — HIGH (ref 3.8–10.5)
WBC # FLD AUTO: 10.1 K/UL — SIGNIFICANT CHANGE UP (ref 3.8–10.5)
WBC # FLD AUTO: 11 K/UL — HIGH (ref 3.8–10.5)

## 2017-08-03 PROCEDURE — 93010 ELECTROCARDIOGRAM REPORT: CPT

## 2017-08-03 PROCEDURE — 99292 CRITICAL CARE ADDL 30 MIN: CPT

## 2017-08-03 PROCEDURE — 99291 CRITICAL CARE FIRST HOUR: CPT

## 2017-08-03 PROCEDURE — 71010: CPT | Mod: 26

## 2017-08-03 PROCEDURE — 93306 TTE W/DOPPLER COMPLETE: CPT | Mod: 26

## 2017-08-03 RX ORDER — CALCIUM GLUCONATE 100 MG/ML
1 VIAL (ML) INTRAVENOUS ONCE
Qty: 1 | Refills: 0 | Status: COMPLETED | OUTPATIENT
Start: 2017-08-03 | End: 2017-08-03

## 2017-08-03 RX ORDER — POTASSIUM PHOSPHATE, MONOBASIC POTASSIUM PHOSPHATE, DIBASIC 236; 224 MG/ML; MG/ML
15 INJECTION, SOLUTION INTRAVENOUS ONCE
Qty: 0 | Refills: 0 | Status: COMPLETED | OUTPATIENT
Start: 2017-08-03 | End: 2017-08-03

## 2017-08-03 RX ORDER — AMIODARONE HYDROCHLORIDE 400 MG/1
200 TABLET ORAL DAILY
Qty: 0 | Refills: 0 | Status: DISCONTINUED | OUTPATIENT
Start: 2017-08-05 | End: 2017-08-10

## 2017-08-03 RX ORDER — FUROSEMIDE 40 MG
10 TABLET ORAL ONCE
Qty: 0 | Refills: 0 | Status: COMPLETED | OUTPATIENT
Start: 2017-08-03 | End: 2017-08-03

## 2017-08-03 RX ORDER — MORPHINE SULFATE 50 MG/1
1 CAPSULE, EXTENDED RELEASE ORAL ONCE
Qty: 0 | Refills: 0 | Status: DISCONTINUED | OUTPATIENT
Start: 2017-08-03 | End: 2017-08-03

## 2017-08-03 RX ORDER — MAGNESIUM HYDROXIDE 400 MG/1
30 TABLET, CHEWABLE ORAL DAILY
Qty: 0 | Refills: 0 | Status: DISCONTINUED | OUTPATIENT
Start: 2017-08-03 | End: 2017-08-10

## 2017-08-03 RX ORDER — AMIODARONE HYDROCHLORIDE 400 MG/1
150 TABLET ORAL ONCE
Qty: 0 | Refills: 0 | Status: DISCONTINUED | OUTPATIENT
Start: 2017-08-03 | End: 2017-08-03

## 2017-08-03 RX ORDER — AMIODARONE HYDROCHLORIDE 400 MG/1
1 TABLET ORAL
Qty: 900 | Refills: 0 | Status: DISCONTINUED | OUTPATIENT
Start: 2017-08-03 | End: 2017-08-03

## 2017-08-03 RX ORDER — ACETAMINOPHEN 500 MG
1000 TABLET ORAL ONCE
Qty: 0 | Refills: 0 | Status: COMPLETED | OUTPATIENT
Start: 2017-08-03 | End: 2017-08-03

## 2017-08-03 RX ORDER — KETOROLAC TROMETHAMINE 30 MG/ML
15 SYRINGE (ML) INJECTION ONCE
Qty: 0 | Refills: 0 | Status: DISCONTINUED | OUTPATIENT
Start: 2017-08-03 | End: 2017-08-03

## 2017-08-03 RX ORDER — POTASSIUM CHLORIDE 20 MEQ
10 PACKET (EA) ORAL ONCE
Qty: 0 | Refills: 0 | Status: COMPLETED | OUTPATIENT
Start: 2017-08-03 | End: 2017-08-03

## 2017-08-03 RX ADMIN — Medication 4: at 17:06

## 2017-08-03 RX ADMIN — Medication 1000 MILLIGRAM(S): at 09:49

## 2017-08-03 RX ADMIN — MORPHINE SULFATE 1 MILLIGRAM(S): 50 CAPSULE, EXTENDED RELEASE ORAL at 00:44

## 2017-08-03 RX ADMIN — MORPHINE SULFATE 1 MILLIGRAM(S): 50 CAPSULE, EXTENDED RELEASE ORAL at 01:00

## 2017-08-03 RX ADMIN — OXYCODONE AND ACETAMINOPHEN 2 TABLET(S): 5; 325 TABLET ORAL at 18:05

## 2017-08-03 RX ADMIN — Medication 2: at 12:13

## 2017-08-03 RX ADMIN — AMIODARONE HYDROCHLORIDE 400 MILLIGRAM(S): 400 TABLET ORAL at 15:32

## 2017-08-03 RX ADMIN — FAMOTIDINE 20 MILLIGRAM(S): 10 INJECTION INTRAVENOUS at 05:22

## 2017-08-03 RX ADMIN — Medication 200 GRAM(S): at 00:12

## 2017-08-03 RX ADMIN — TAMSULOSIN HYDROCHLORIDE 0.4 MILLIGRAM(S): 0.4 CAPSULE ORAL at 21:34

## 2017-08-03 RX ADMIN — Medication 100 MILLIGRAM(S): at 21:34

## 2017-08-03 RX ADMIN — OXYCODONE AND ACETAMINOPHEN 2 TABLET(S): 5; 325 TABLET ORAL at 18:21

## 2017-08-03 RX ADMIN — Medication 15 MILLIGRAM(S): at 17:45

## 2017-08-03 RX ADMIN — HEPARIN SODIUM 5000 UNIT(S): 5000 INJECTION INTRAVENOUS; SUBCUTANEOUS at 15:32

## 2017-08-03 RX ADMIN — MORPHINE SULFATE 2 MILLIGRAM(S): 50 CAPSULE, EXTENDED RELEASE ORAL at 09:49

## 2017-08-03 RX ADMIN — Medication 400 MILLIGRAM(S): at 09:17

## 2017-08-03 RX ADMIN — Medication 100 MILLIGRAM(S): at 05:22

## 2017-08-03 RX ADMIN — Medication 100 MILLIGRAM(S): at 15:32

## 2017-08-03 RX ADMIN — AMIODARONE HYDROCHLORIDE 400 MILLIGRAM(S): 400 TABLET ORAL at 21:34

## 2017-08-03 RX ADMIN — HEPARIN SODIUM 5000 UNIT(S): 5000 INJECTION INTRAVENOUS; SUBCUTANEOUS at 21:34

## 2017-08-03 RX ADMIN — FAMOTIDINE 20 MILLIGRAM(S): 10 INJECTION INTRAVENOUS at 18:17

## 2017-08-03 RX ADMIN — AMIODARONE HYDROCHLORIDE 400 MILLIGRAM(S): 400 TABLET ORAL at 05:22

## 2017-08-03 RX ADMIN — ATORVASTATIN CALCIUM 40 MILLIGRAM(S): 80 TABLET, FILM COATED ORAL at 21:34

## 2017-08-03 RX ADMIN — LIDOCAINE 1 PATCH: 4 CREAM TOPICAL at 12:12

## 2017-08-03 RX ADMIN — Medication 81 MILLIGRAM(S): at 12:14

## 2017-08-03 RX ADMIN — BICALUTAMIDE 50 MILLIGRAM(S): 50 TABLET, FILM COATED ORAL at 09:47

## 2017-08-03 RX ADMIN — Medication 100 MILLIEQUIVALENT(S): at 04:50

## 2017-08-03 RX ADMIN — SENNA PLUS 2 TABLET(S): 8.6 TABLET ORAL at 21:34

## 2017-08-03 RX ADMIN — Medication 15 MILLIGRAM(S): at 17:20

## 2017-08-03 RX ADMIN — Medication 10 MILLIGRAM(S): at 00:11

## 2017-08-03 RX ADMIN — HEPARIN SODIUM 5000 UNIT(S): 5000 INJECTION INTRAVENOUS; SUBCUTANEOUS at 05:22

## 2017-08-03 RX ADMIN — POTASSIUM PHOSPHATE, MONOBASIC POTASSIUM PHOSPHATE, DIBASIC 62.5 MILLIMOLE(S): 236; 224 INJECTION, SOLUTION INTRAVENOUS at 00:25

## 2017-08-03 NOTE — PROGRESS NOTE ADULT - SUBJECTIVE AND OBJECTIVE BOX
CTICU  CRITICAL  CARE  attending     Hand off received 					   Pertinent clinical, laboratory, radiographic, hemodynamic, echocardiographic, respiratory data, microbiologic data and chart were reviewed and analyzed frequently throughout the course of the day and night  Patient seen and examined with CTS/ SH attending at bedside  Pt is a 71y , Male, HEALTH ISSUES - PROBLEM Dx:      , FAMILY HISTORY:  Family history of cancer in sister (Sibling)  Family history of diabetes mellitus (Mother)  PAST MEDICAL & SURGICAL HISTORY:  Shortness of breath  Bronchitis  Paroxysmal atrial fibrillation  Congestive heart failure, unspecified congestive heart failure chronicity, unspecified congestive heart failure type  Metastasis  Prostate cancer  Benign prostatic hyperplasia, presence of lower urinary tract symptoms unspecified, unspecified morphology  Collar bone fracture    Patient is a 71y old  Male who presents with a chief complaint of aortic Aneurysm (2017 09:51)      14 system review was unremarkable  acute changes include acute respiratory failure  Vital signs, hemodynamic and respiratory parameters were reviewed from the bedside nursing flowsheet.  ICU Vital Signs Last 24 Hrs  T(C): 36.1 (03 Aug 2017 21:05), Max: 36.6 (03 Aug 2017 05:00)  T(F): 96.9 (03 Aug 2017 21:05), Max: 97.9 (03 Aug 2017 05:00)  HR: 64 (03 Aug 2017 21:00) (64 - 106)  BP: 119/80 (03 Aug 2017 21:00) (111/72 - 126/80)  BP(mean): 102 (03 Aug 2017 18:30) (85 - 102)  ABP: 118/58 (03 Aug 2017 15:00) (90/42 - 136/72)  ABP(mean): 75 (03 Aug 2017 15:00) (56 - 92)  RR: 21 (03 Aug 2017 21:00) (11 - 36)  SpO2: 99% (03 Aug 2017 21:00) (79% - 99%)    Adult Advanced Hemodynamics Last 24 Hrs  CVP(mm Hg): 22 (03 Aug 2017 07:00) (11 - 22)  CVP(cm H2O): --  CO: --  CI: --  PA: --  PA(mean): --  PCWP: --  SVR: --  SVRI: --  PVR: --  PVRI: --, ABG - ( 03 Aug 2017 09:03 )  pH: 7.46  /  pCO2: 32    /  pO2: 116   / HCO3: 22    / Base Excess: -1.0  /  SaO2: 98                  Intake and output was reviewed and the fluid balance was calculated  Daily     Daily Weight in k.7 (03 Aug 2017 14:21)  I&O's Summary    02 Aug 2017 07:01  -  03 Aug 2017 07:00  --------------------------------------------------------  IN: 2040 mL / OUT: 1940 mL / NET: 100 mL    03 Aug 2017 07:01  -  03 Aug 2017 21:25  --------------------------------------------------------  IN: 763.2 mL / OUT: 395 mL / NET: 368.2 mL        All lines and drain sites were assessed  Glycemic trend was reviewedCAPILLARY BLOOD GLUCOSE  116 (03 Aug 2017 21:05)        No acute change in mental status  Auscultation of the chest reveals equal bs  Abdomen is soft  Extremities are warm and well perfused  Wounds appear clean and unremarkable  Antibiotics are periop    labs  CBC Full  -  ( 03 Aug 2017 09:00 )  WBC Count : 11.0 K/uL  Hemoglobin : 9.7 g/dL  Hematocrit : 29.5 %  Platelet Count - Automated : 94 K/uL  Mean Cell Volume : 94.2 fL  Mean Cell Hemoglobin : 31.0 pg  Mean Cell Hemoglobin Concentration : 32.9 g/dL  Auto Neutrophil # : x  Auto Lymphocyte # : x  Auto Monocyte # : x  Auto Eosinophil # : x  Auto Basophil # : x  Auto Neutrophil % : x  Auto Lymphocyte % : x  Auto Monocyte % : x  Auto Eosinophil % : x  Auto Basophil % : x    08-03    134<L>  |  100  |  23  ----------------------------<  160<H>  3.9   |  21<L>  |  0.90    Ca    8.4      03 Aug 2017 09:00  Phos  3.1     08-03  Mg     2.2     08-03    TPro  6.5  /  Alb  3.4  /  TBili  1.4<H>  /  DBili  0.3<H>  /  AST  52<H>  /  ALT  17  /  AlkPhos  53  08-03    PT/INR - ( 03 Aug 2017 09:00 )   PT: 14.5 sec;   INR: 1.30          PTT - ( 03 Aug 2017 09:00 )  PTT:28.8 sec  The current medications were reviewed   MEDICATIONS  (STANDING):  sodium chloride 0.45%. 1000 milliLiter(s) (10 mL/Hr) IV Continuous <Continuous>  heparin  Injectable 5000 Unit(s) SubCutaneous every 8 hours  tamsulosin 0.4 milliGRAM(s) Oral at bedtime  atorvastatin 40 milliGRAM(s) Oral at bedtime  insulin lispro (HumaLOG) corrective regimen sliding scale   SubCutaneous Before meals and at bedtime  dextrose 5%. 1000 milliLiter(s) (50 mL/Hr) IV Continuous <Continuous>  dextrose 50% Injectable 12.5 Gram(s) IV Push once  dextrose 50% Injectable 25 Gram(s) IV Push once  dextrose 50% Injectable 25 Gram(s) IV Push once  famotidine    Tablet 20 milliGRAM(s) Oral two times a day  aspirin enteric coated 81 milliGRAM(s) Oral daily  amiodarone    Tablet 400 milliGRAM(s) Oral every 8 hours  lidocaine   Patch 1 Patch Transdermal daily  docusate sodium 100 milliGRAM(s) Oral three times a day  senna 2 Tablet(s) Oral at bedtime  bicalutamide 50 milliGRAM(s) Oral daily    MEDICATIONS  (PRN):  dextrose Gel 1 Dose(s) Oral once PRN Blood Glucose LESS THAN 70 milliGRAM(s)/deciliter  glucagon  Injectable 1 milliGRAM(s) IntraMuscular once PRN Glucose LESS THAN 70 milligrams/deciliter  acetaminophen   Tablet. 650 milliGRAM(s) Oral every 6 hours PRN Mild Pain (1 - 3)  traMADol 50 milliGRAM(s) Oral every 6 hours PRN Moderate Pain (4 - 6)  polyethylene glycol 3350 17 Gram(s) Oral daily PRN Constipation  oxyCODONE    5 mG/acetaminophen 325 mG 2 Tablet(s) Oral every 6 hours PRN Severe Pain (7 - 10)  magnesium hydroxide Suspension 30 milliLiter(s) Oral daily PRN Constipation       PROBLEM LIST/ ASSESSMENT:  HEALTH ISSUES - PROBLEM Dx:      ,   Patient is a 71y old  Male who presents with a chief complaint of aortic Aneurysm (2017 09:51)       acute changes include acute respiratory failure    My plan includes :  close hemodynamic, ventilatory and drain monitoring and management per post op routine    Monitor for arrhythmias and monitor parameters for organ perfusion  monitor neurologic status  Head of the bed should remain elevated to 45 deg .   chest PT and IS will be encouraged  monitor adequacy of oxygenation and ventilation and attempt to wean oxygen  Nutritional goals will be met using po eventually , ensure adequate caloric intake and montior the same  Stress ulcer and VTE prophylaxis will be achieved    Glycemic control is satisfactory  Electrolytes have been repleted as necessary and wound care has been carried out. Pain control has been achieved.   agressive physical therapy and early mobility and ambulation goals will be met   The family was updated about the course and plan  CRITICAL CARE TIME SPENT in evaluation and management, reassessments, review and interpretation of labs and x-rays, ventilator and hemodynamic management, formulating a plan and coordinating care: ___90____ MIN.  Time does not include procedural time.  CTICU ATTENDING     					    Deo Fonseca MD

## 2017-08-03 NOTE — DIETITIAN INITIAL EVALUATION ADULT. - PERTINENT MEDS FT
aspirin, heparin, amiodarone, mag hydroxide, D5, D50, dex gel, glucagon, SSI, colace, pepcid, miralax, percocet, senna, lipitor, chemo

## 2017-08-03 NOTE — CHART NOTE - NSCHARTNOTEFT_GEN_A_CORE
Mediastinal and Pleural estela with minimal drainage in 12 hours.  As discussed on morning rounds, right pleural estela and 2 mediastinal blakes removed at the bedside without incidence.  Patient tolerated procedure well.  Occlusive dressing in place.  Xray pending. Mediastinal and Pleural estela with minimal drainage in 12 hours.  As discussed on morning rounds, right pleural estela and 2 mediastinal blakes removed at the bedside without incidence.  Patient tolerated procedure well.  Occlusive dressing in place.  Post procedure CXR without obvious PTX.

## 2017-08-03 NOTE — DIETITIAN INITIAL EVALUATION ADULT. - ENERGY NEEDS
IBW 86.4Kg  %IBW 99%  BMI 24.3    Utilized ABW to calculate needs, pt falls within % of IBW. Adjusted for age/catabolic illness and post-op. Fluids per MD 2/2 CHF.

## 2017-08-03 NOTE — DIETITIAN INITIAL EVALUATION ADULT. - OTHER INFO
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 Pt seen asleep in bed on NC. Per RN, pt with a fair appetite and intake; unsure of meal consumption this am. No apparent GI distress noted. Pain being managed. No family at bedside and unable to obtain wt and diet history. Will follow to monitor meal intake/tolerance and provide education as appropriate.

## 2017-08-04 ENCOUNTER — TRANSCRIPTION ENCOUNTER (OUTPATIENT)
Age: 72
End: 2017-08-04

## 2017-08-04 LAB
ANION GAP SERPL CALC-SCNC: 15 MMOL/L — SIGNIFICANT CHANGE UP (ref 5–17)
APTT BLD: 26.3 SEC — LOW (ref 27.5–37.4)
BUN SERPL-MCNC: 27 MG/DL — HIGH (ref 7–23)
CALCIUM SERPL-MCNC: 9 MG/DL — SIGNIFICANT CHANGE UP (ref 8.4–10.5)
CHLORIDE SERPL-SCNC: 98 MMOL/L — SIGNIFICANT CHANGE UP (ref 96–108)
CO2 SERPL-SCNC: 22 MMOL/L — SIGNIFICANT CHANGE UP (ref 22–31)
CREAT SERPL-MCNC: 1.1 MG/DL — SIGNIFICANT CHANGE UP (ref 0.5–1.3)
GLUCOSE SERPL-MCNC: 123 MG/DL — HIGH (ref 70–99)
HCT VFR BLD CALC: 29.3 % — LOW (ref 39–50)
HGB BLD-MCNC: 9.9 G/DL — LOW (ref 13–17)
INR BLD: 1.17 — HIGH (ref 0.88–1.16)
MAGNESIUM SERPL-MCNC: 2.4 MG/DL — SIGNIFICANT CHANGE UP (ref 1.6–2.6)
MCHC RBC-ENTMCNC: 31.6 PG — SIGNIFICANT CHANGE UP (ref 27–34)
MCHC RBC-ENTMCNC: 33.8 G/DL — SIGNIFICANT CHANGE UP (ref 32–36)
MCV RBC AUTO: 93.6 FL — SIGNIFICANT CHANGE UP (ref 80–100)
PLATELET # BLD AUTO: 118 K/UL — LOW (ref 150–400)
POTASSIUM SERPL-MCNC: 4.1 MMOL/L — SIGNIFICANT CHANGE UP (ref 3.5–5.3)
POTASSIUM SERPL-SCNC: 4.1 MMOL/L — SIGNIFICANT CHANGE UP (ref 3.5–5.3)
PROTHROM AB SERPL-ACNC: 13 SEC — HIGH (ref 9.8–12.7)
RBC # BLD: 3.13 M/UL — LOW (ref 4.2–5.8)
RBC # FLD: 14.6 % — SIGNIFICANT CHANGE UP (ref 10.3–16.9)
SODIUM SERPL-SCNC: 135 MMOL/L — SIGNIFICANT CHANGE UP (ref 135–145)
SURGICAL PATHOLOGY STUDY: SIGNIFICANT CHANGE UP
WBC # BLD: 10 K/UL — SIGNIFICANT CHANGE UP (ref 3.8–10.5)
WBC # FLD AUTO: 10 K/UL — SIGNIFICANT CHANGE UP (ref 3.8–10.5)

## 2017-08-04 PROCEDURE — 71010: CPT | Mod: 26

## 2017-08-04 RX ORDER — ONDANSETRON 8 MG/1
4 TABLET, FILM COATED ORAL ONCE
Qty: 0 | Refills: 0 | Status: COMPLETED | OUTPATIENT
Start: 2017-08-04 | End: 2017-08-04

## 2017-08-04 RX ORDER — METOPROLOL TARTRATE 50 MG
12.5 TABLET ORAL EVERY 12 HOURS
Qty: 0 | Refills: 0 | Status: DISCONTINUED | OUTPATIENT
Start: 2017-08-04 | End: 2017-08-06

## 2017-08-04 RX ADMIN — FAMOTIDINE 20 MILLIGRAM(S): 10 INJECTION INTRAVENOUS at 17:28

## 2017-08-04 RX ADMIN — AMIODARONE HYDROCHLORIDE 400 MILLIGRAM(S): 400 TABLET ORAL at 14:31

## 2017-08-04 RX ADMIN — HEPARIN SODIUM 5000 UNIT(S): 5000 INJECTION INTRAVENOUS; SUBCUTANEOUS at 21:45

## 2017-08-04 RX ADMIN — FAMOTIDINE 20 MILLIGRAM(S): 10 INJECTION INTRAVENOUS at 05:35

## 2017-08-04 RX ADMIN — Medication 100 MILLIGRAM(S): at 05:35

## 2017-08-04 RX ADMIN — TRAMADOL HYDROCHLORIDE 50 MILLIGRAM(S): 50 TABLET ORAL at 18:37

## 2017-08-04 RX ADMIN — BICALUTAMIDE 50 MILLIGRAM(S): 50 TABLET, FILM COATED ORAL at 12:50

## 2017-08-04 RX ADMIN — Medication 100 MILLIGRAM(S): at 21:45

## 2017-08-04 RX ADMIN — Medication 12.5 MILLIGRAM(S): at 17:28

## 2017-08-04 RX ADMIN — LIDOCAINE 1 PATCH: 4 CREAM TOPICAL at 00:19

## 2017-08-04 RX ADMIN — Medication 100 MILLIGRAM(S): at 14:31

## 2017-08-04 RX ADMIN — OXYCODONE AND ACETAMINOPHEN 2 TABLET(S): 5; 325 TABLET ORAL at 22:12

## 2017-08-04 RX ADMIN — SENNA PLUS 2 TABLET(S): 8.6 TABLET ORAL at 21:47

## 2017-08-04 RX ADMIN — OXYCODONE AND ACETAMINOPHEN 2 TABLET(S): 5; 325 TABLET ORAL at 17:10

## 2017-08-04 RX ADMIN — OXYCODONE AND ACETAMINOPHEN 2 TABLET(S): 5; 325 TABLET ORAL at 06:47

## 2017-08-04 RX ADMIN — OXYCODONE AND ACETAMINOPHEN 2 TABLET(S): 5; 325 TABLET ORAL at 14:06

## 2017-08-04 RX ADMIN — OXYCODONE AND ACETAMINOPHEN 2 TABLET(S): 5; 325 TABLET ORAL at 00:20

## 2017-08-04 RX ADMIN — ATORVASTATIN CALCIUM 40 MILLIGRAM(S): 80 TABLET, FILM COATED ORAL at 21:42

## 2017-08-04 RX ADMIN — TAMSULOSIN HYDROCHLORIDE 0.4 MILLIGRAM(S): 0.4 CAPSULE ORAL at 21:46

## 2017-08-04 RX ADMIN — ONDANSETRON 4 MILLIGRAM(S): 8 TABLET, FILM COATED ORAL at 18:40

## 2017-08-04 RX ADMIN — OXYCODONE AND ACETAMINOPHEN 2 TABLET(S): 5; 325 TABLET ORAL at 21:39

## 2017-08-04 RX ADMIN — TRAMADOL HYDROCHLORIDE 50 MILLIGRAM(S): 50 TABLET ORAL at 17:26

## 2017-08-04 RX ADMIN — HEPARIN SODIUM 5000 UNIT(S): 5000 INJECTION INTRAVENOUS; SUBCUTANEOUS at 14:30

## 2017-08-04 RX ADMIN — AMIODARONE HYDROCHLORIDE 400 MILLIGRAM(S): 400 TABLET ORAL at 21:40

## 2017-08-04 RX ADMIN — OXYCODONE AND ACETAMINOPHEN 2 TABLET(S): 5; 325 TABLET ORAL at 00:54

## 2017-08-04 RX ADMIN — Medication 2: at 21:47

## 2017-08-04 RX ADMIN — OXYCODONE AND ACETAMINOPHEN 2 TABLET(S): 5; 325 TABLET ORAL at 07:14

## 2017-08-04 RX ADMIN — AMIODARONE HYDROCHLORIDE 400 MILLIGRAM(S): 400 TABLET ORAL at 05:35

## 2017-08-04 RX ADMIN — Medication 81 MILLIGRAM(S): at 12:50

## 2017-08-04 RX ADMIN — LIDOCAINE 1 PATCH: 4 CREAM TOPICAL at 12:50

## 2017-08-04 RX ADMIN — HEPARIN SODIUM 5000 UNIT(S): 5000 INJECTION INTRAVENOUS; SUBCUTANEOUS at 05:34

## 2017-08-04 NOTE — DISCHARGE NOTE ADULT - CARE PLAN
Principal Discharge DX:	Thoracic aortic aneurysm without rupture  Goal:	To recover from surgery  Instructions for follow-up, activity and diet:	-Please follow up with Dr. Fletcher on Wednesday 8/16/17 @ 11:30am.  The office is located at [facility], [address] [place] [address], [facility], 4th floor. Call us with any questions #[phone].    -Walk daily as tolerated and use your incentive spirometer every hour.    -No driving or strenuous activity/exercise for 6 weeks, or until cleared by your surgeon.    -Gently clean your incisions with anti-bacterial soap and water, pat dry.  You may leave them open to air.    -Call your doctor if you have shortness of breath, chest pain not relieved by pain medication, dizziness, fever >101.5, or increased redness or drainage from incisions. Principal Discharge DX:	Thoracic aortic aneurysm without rupture  Goal:	To recover from surgery  Instructions for follow-up, activity and diet:	-Please follow up with Dr. Fletcher on Wednesday 8/16/17 @ 11:30am.  The office is located at [facility], [address] [place] [address], [facility], 4th floor. Call us with any questions #[phone].    -Walk daily as tolerated and use your incentive spirometer every hour.    -No driving or strenuous activity/exercise for 6 weeks, or until cleared by your surgeon.    -Gently clean your incisions with anti-bacterial soap and water, pat dry.  You may leave them open to air.    -Call your doctor if you have shortness of breath, chest pain not relieved by pain medication, dizziness, fever >101.5, or increased redness or drainage from incisions. Principal Discharge DX:	Thoracic aortic aneurysm without rupture  Goal:	To recover from surgery  Instructions for follow-up, activity and diet:	-Please follow up with Dr. Fletcher on Wednesday 8/16/17 @ 11:30am.  The office is located at [facility], [address] [place] [address], [facility], 4th floor. Call us with any questions #[phone].    -Walk daily as tolerated and use your incentive spirometer every hour.    -No driving or strenuous activity/exercise for 6 weeks, or until cleared by your surgeon.    -Gently clean your incisions with anti-bacterial soap and water, pat dry.  You may leave them open to air.    -Call your doctor if you have shortness of breath, chest pain not relieved by pain medication, dizziness, fever >101.5, or increased redness or drainage from incisions. Principal Discharge DX:	Thoracic aortic aneurysm without rupture  Goal:	To recover from surgery  Instructions for follow-up, activity and diet:	-Please follow up with Dr. Fletcher on Wednesday 8/16/17 @ 11:30am.  The office is located at [facility], [address] [place] [address], [facility], 4th floor. Call us with any questions #[phone].    -Walk daily as tolerated and use your incentive spirometer every hour.    -No driving or strenuous activity/exercise for 6 weeks, or until cleared by your surgeon.    -Gently clean your incisions with anti-bacterial soap and water, pat dry.  You may leave them open to air.    -Call your doctor if you have shortness of breath, chest pain not relieved by pain medication, dizziness, fever >101.5, or increased redness or drainage from incisions. Principal Discharge DX:	Thoracic aortic aneurysm without rupture  Goal:	To recover from surgery  Instructions for follow-up, activity and diet:	-Please follow up with Dr. Fletcher on Wednesday 8/16/17 @ 11:30am.  The office is located at [facility], [address] [place] [address], [facility], 4th floor. Call us with any questions #[phone].    -Walk daily as tolerated and use your incentive spirometer every hour.    -No driving or strenuous activity/exercise for 6 weeks, or until cleared by your surgeon.    -Gently clean your incisions with anti-bacterial soap and water, pat dry.  You may leave them open to air.    -Call your doctor if you have shortness of breath, chest pain not relieved by pain medication, dizziness, fever >101.5, or increased redness or drainage from incisions. Principal Discharge DX:	Thoracic aortic aneurysm without rupture  Goal:	To recover from surgery  Instructions for follow-up, activity and diet:	-Please follow up with Dr. Fletcher on Wednesday 8/16/17 @ 11:30am.  The office is located at [facility], [address] [place] [address], [facility], 4th floor. Call us with any questions #[phone].    -Walk daily as tolerated and use your incentive spirometer every hour.    -No driving or strenuous activity/exercise for 6 weeks, or until cleared by your surgeon.    -Gently clean your incisions with anti-bacterial soap and water, pat dry.  You may leave them open to air.    -Call your doctor if you have shortness of breath, chest pain not relieved by pain medication, dizziness, fever >101.5, or increased redness or drainage from incisions. Principal Discharge DX:	Thoracic aortic aneurysm without rupture  Goal:	To recover from surgery  Instructions for follow-up, activity and diet:	-Please follow up with Dr. Fletcher on Wednesday 8/16/17 @ 11:30am.  The office is located at [facility], [address] [place] [address], [facility], 4th floor. Call us with any questions #[phone].    -Walk daily as tolerated and use your incentive spirometer every hour.    -No driving or strenuous activity/exercise for 6 weeks, or until cleared by your surgeon.    -Gently clean your incisions with anti-bacterial soap and water, pat dry.  You may leave them open to air.    -Call your doctor if you have shortness of breath, chest pain not relieved by pain medication, dizziness, fever >101.5, or increased redness or drainage from incisions.

## 2017-08-04 NOTE — DISCHARGE NOTE ADULT - HOSPITAL COURSE
70 y/o male, former smoker with PMHx systolic CHF, EF 40%, MI 2016 treated medically, ?afib in the past (on ASA only per patient, not on full A/C), recurrent prostate CA with mets to the vertebrae and lungs, s/p ADT and EBRT, recently advised to have another ADT/monotherapy with high dose androgen blocker for his prostate CA, but he refused treatment and would like to explore alternative therpaies.  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  Patient seen as out-patient and deemed a candidate for elective surgery.  He did admit to SOB upon walking uphill, but compared to previously, his exercise tolerance has decreased.  Echo showed EF 40%, mild-mod AI; Cardiac cath 7/2017 showed no CAD.  He came in as SDA and s/p valve-sparing aortic root/hemiarch replacement on 7/31/17 EF 45% intra-op.  In ICU, received 1 unit PRBC on POD#0, was on pressors that were weaned off.  Then required Cleviprex.  Extubated POD#1.  Had post-op afib, and started on amio-load.  Received another PRBC POD#3 for hypotension; BB held.  Remains in NSR.  Transferred to floor.      ********************Updated as of 8/4/17; Please finish summary upon discharge************************* 70 y/o male, former smoker with PMHx systolic CHF, EF 40%, MI 2016 treated medically, ?afib in the past (on ASA only per patient, not on full A/C), recurrent prostate CA with mets to the vertebrae and lungs, s/p ADT and EBRT, recently advised to have another ADT/monotherapy with high dose androgen blocker for his prostate CA, but he refused treatment and would like to explore alternative therpaies.  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  Patient seen as out-patient and deemed a candidate for elective surgery.  He did admit to SOB upon walking uphill, but compared to previously, his exercise tolerance has decreased.  Echo showed EF 40%, mild-mod AI; Cardiac cath 7/2017 showed no CAD.  He came in as SDA and s/p valve-sparing aortic root/hemiarch replacement on 7/31/17 EF 45% intra-op.  In ICU, received 1 unit PRBC on POD#0, was on pressors that were weaned off.  Then required Cleviprex.  Extubated POD#1.  Had post-op afib, and started on amio-load.  Received another PRBC POD#3 for hypotension; BB held.  Remains in NSR.  Transferred to floor on 8/4/17.  Beta-blocker restarted.  Had some vomiting, relieved with Zofran.  POD#6 had a-fib with RVR, titrated up beta-blocker. B/L pigtails placed for pleural effusions (500cc left, 600cc right).  Total drainage was 1.2L right, and 780cc left.  Converted to NSR.  Slowly weaned O2.  Chest tubes removed.  POD#9 had post-op afib again, converted to NSR.  Decision made to start Eliquis.  Prior authorization achieved.  Patient given extra lasix for SOB.  Now stable, on room air, ambulating well, tolerating PO diet.  Stable and ready for discharge home as discussed on morning rounds.

## 2017-08-04 NOTE — DISCHARGE NOTE ADULT - MEDICATION SUMMARY - MEDICATIONS TO STOP TAKING
I will STOP taking the medications listed below when I get home from the hospital:    torsemide 20 mg oral tablet  -- 1 tab(s) by mouth once a day    bicalutamide 50 mg oral tablet  -- 1 tab(s) by mouth 2 times a day    lisinopril 10 mg oral tablet  -- 1 tab(s) by mouth once a day

## 2017-08-04 NOTE — PROGRESS NOTE ADULT - ASSESSMENT
71 year old male s/p valve sparring aortic root and hemiarch replacement, EF 45% on 7/31/17. Patient is POD #4 from procedure.    Neuro  -  Pain adequately controlled on current regimen 71 year old male s/p valve sparring aortic root and hemiarch replacement, EF 45% on 7/31/17. Patient is POD #4 from procedure.    Neuro  -  Pain adequately controlled on current regimen    CV  -  paroxysmal afib currently in NSR  -  amiodarone load  -  B blocker held yesterday 2/2 hypotension, restarting this afternoon  -  cont ASA, statin    Pulm  -  Encourage IS  -  Ambulate 5x    GI  -  DASH diet  -  on PPx  -  Senna/Colace    Renal/  -  Crt. stable; cont. to trend  -  Flomax  -  h/o prostate CA, cont. Casodex    Heme  -  DVT PPx    Dispo  -  Home candidate for Sun/Mon  -   to see patient; lives alone and would like assistance

## 2017-08-04 NOTE — PROGRESS NOTE ADULT - SUBJECTIVE AND OBJECTIVE BOX
Patient discussed on morning rounds with Dr. Fletcher    Operation / Date:     SUBJECTIVE ASSESSMENT:  71y Male         Vital Signs Last 24 Hrs  T(C): 36.4 (04 Aug 2017 09:57), Max: 36.6 (03 Aug 2017 16:49)  T(F): 97.5 (04 Aug 2017 09:57), Max: 97.8 (03 Aug 2017 16:49)  HR: 66 (04 Aug 2017 05:00) (64 - 100)  BP: 126/69 (04 Aug 2017 05:00) (112/67 - 126/80)  BP(mean): 81 (04 Aug 2017 05:00) (81 - 102)  RR: 14 (04 Aug 2017 05:00) (14 - 26)  SpO2: 98% (04 Aug 2017 05:00) (79% - 99%)  I&O's Detail    03 Aug 2017 07:01  -  04 Aug 2017 07:00  --------------------------------------------------------  IN:    amiodarone Infusion: 133.2 mL    Oral Fluid: 820 mL    sodium chloride 0.45%.: 50 mL  Total IN: 1003.2 mL    OUT:    Drain: 20 mL    Voided: 575 mL  Total OUT: 595 mL    Total NET: 408.2 mL    CHEST TUBE:  No  JAM DRAIN:  Yes  EPICARDIAL WIRES: Yes  TIE DOWNS: No  PAREDES: No    PHYSICAL EXAM:    General:     Neurological:    Cardiovascular:    Respiratory:    Gastrointestinal:    Extremities:    Vascular:    Incision Sites:    LABS:                        9.9    10.0  )-----------( 118      ( 04 Aug 2017 07:48 )             29.3       COUMADIN:  Yes/No. REASON: .    PT/INR - ( 04 Aug 2017 07:38 )   PT: 13.0 sec;   INR: 1.17          PTT - ( 04 Aug 2017 07:38 )  PTT:26.3 sec    08-04    135  |  98  |  27<H>  ----------------------------<  123<H>  4.1   |  22  |  1.10    Ca    9.0      04 Aug 2017 07:14  Phos  3.1     08-03  Mg     2.4     08-04    TPro  6.5  /  Alb  3.4  /  TBili  1.4<H>  /  DBili  0.3<H>  /  AST  52<H>  /  ALT  17  /  AlkPhos  53  08-03    MEDICATIONS  (STANDING):  sodium chloride 0.45%. 1000 milliLiter(s) (10 mL/Hr) IV Continuous <Continuous>  heparin  Injectable 5000 Unit(s) SubCutaneous every 8 hours  tamsulosin 0.4 milliGRAM(s) Oral at bedtime  atorvastatin 40 milliGRAM(s) Oral at bedtime  insulin lispro (HumaLOG) corrective regimen sliding scale   SubCutaneous Before meals and at bedtime  dextrose 5%. 1000 milliLiter(s) (50 mL/Hr) IV Continuous <Continuous>  dextrose 50% Injectable 12.5 Gram(s) IV Push once  dextrose 50% Injectable 25 Gram(s) IV Push once  dextrose 50% Injectable 25 Gram(s) IV Push once  famotidine    Tablet 20 milliGRAM(s) Oral two times a day  aspirin enteric coated 81 milliGRAM(s) Oral daily  amiodarone    Tablet 400 milliGRAM(s) Oral every 8 hours  lidocaine   Patch 1 Patch Transdermal daily  docusate sodium 100 milliGRAM(s) Oral three times a day  senna 2 Tablet(s) Oral at bedtime  bicalutamide 50 milliGRAM(s) Oral daily    MEDICATIONS  (PRN):  dextrose Gel 1 Dose(s) Oral once PRN Blood Glucose LESS THAN 70 milliGRAM(s)/deciliter  glucagon  Injectable 1 milliGRAM(s) IntraMuscular once PRN Glucose LESS THAN 70 milligrams/deciliter  acetaminophen   Tablet. 650 milliGRAM(s) Oral every 6 hours PRN Mild Pain (1 - 3)  traMADol 50 milliGRAM(s) Oral every 6 hours PRN Moderate Pain (4 - 6)  polyethylene glycol 3350 17 Gram(s) Oral daily PRN Constipation  oxyCODONE    5 mG/acetaminophen 325 mG 2 Tablet(s) Oral every 6 hours PRN Severe Pain (7 - 10)  magnesium hydroxide Suspension 30 milliLiter(s) Oral daily PRN Constipation        RADIOLOGY & ADDITIONAL TESTS: Patient discussed on morning rounds with Dr. Fletcher    Operation / Date: 7/31/17:  Valve sparring aortic root and hemiarch replacement, EF 45%    SUBJECTIVE ASSESSMENT:  71y male with slight discomfort to left shoulder, otherwise well.  No CP, SOB, palpitations, N/C/D.  No BM since surgery.      Vital Signs Last 24 Hrs  T(C): 36.4 (04 Aug 2017 09:57), Max: 36.6 (03 Aug 2017 16:49)  T(F): 97.5 (04 Aug 2017 09:57), Max: 97.8 (03 Aug 2017 16:49)  HR: 66 (04 Aug 2017 05:00) (64 - 100)  BP: 126/69 (04 Aug 2017 05:00) (112/67 - 126/80)  BP(mean): 81 (04 Aug 2017 05:00) (81 - 102)  RR: 14 (04 Aug 2017 05:00) (14 - 26)  SpO2: 98% (04 Aug 2017 05:00) (79% - 99%)  I&O's Detail    03 Aug 2017 07:01  -  04 Aug 2017 07:00  --------------------------------------------------------  IN:    amiodarone Infusion: 133.2 mL    Oral Fluid: 820 mL    sodium chloride 0.45%.: 50 mL  Total IN: 1003.2 mL    OUT:    Drain: 20 mL    Voided: 575 mL  Total OUT: 595 mL    Total NET: 408.2 mL    CHEST TUBE:  No  ESTELA DRAIN:  Yes  EPICARDIAL WIRES: Yes  TIE DOWNS: No  PAREDES: No    PHYSICAL EXAM:    General:  WDWN in NAD    Neurological:  AA&Ox3    Cardiovascular: S1S2, no murmurs incision c/d/i, no erythema, no sternal click +estela, +PW    Respiratory: b/l breath sounds    Gastrointestinal: soft, NTND    Extremities: FROM x4 ext    Vascular: WWP    Incision Sites: incision c/d/i, no erythema, no sternal click    LABS:             9.9    10.0  )-----------( 118      ( 04 Aug 2017 07:48 )             29.3       COUMADIN: No.    PT/INR - ( 04 Aug 2017 07:38 )   PT: 13.0 sec;   INR: 1.17       PTT - ( 04 Aug 2017 07:38 )  PTT:26.3 sec    08-04    135  |  98  |  27<H>  ----------------------------<  123<H>  4.1   |  22  |  1.10    Ca    9.0      04 Aug 2017 07:14  Phos  3.1     08-03  Mg     2.4     08-04    TPro  6.5  /  Alb  3.4  /  TBili  1.4<H>  /  DBili  0.3<H>  /  AST  52<H>  /  ALT  17  /  AlkPhos  53  08-03    MEDICATIONS  (STANDING):  sodium chloride 0.45%. 1000 milliLiter(s) (10 mL/Hr) IV Continuous <Continuous>  heparin  Injectable 5000 Unit(s) SubCutaneous every 8 hours  tamsulosin 0.4 milliGRAM(s) Oral at bedtime  atorvastatin 40 milliGRAM(s) Oral at bedtime  insulin lispro (HumaLOG) corrective regimen sliding scale   SubCutaneous Before meals and at bedtime  dextrose 5%. 1000 milliLiter(s) (50 mL/Hr) IV Continuous <Continuous>  dextrose 50% Injectable 12.5 Gram(s) IV Push once  dextrose 50% Injectable 25 Gram(s) IV Push once  dextrose 50% Injectable 25 Gram(s) IV Push once  famotidine    Tablet 20 milliGRAM(s) Oral two times a day  aspirin enteric coated 81 milliGRAM(s) Oral daily  amiodarone    Tablet 400 milliGRAM(s) Oral every 8 hours  lidocaine   Patch 1 Patch Transdermal daily  docusate sodium 100 milliGRAM(s) Oral three times a day  senna 2 Tablet(s) Oral at bedtime  bicalutamide 50 milliGRAM(s) Oral daily    MEDICATIONS  (PRN):  dextrose Gel 1 Dose(s) Oral once PRN Blood Glucose LESS THAN 70 milliGRAM(s)/deciliter  glucagon  Injectable 1 milliGRAM(s) IntraMuscular once PRN Glucose LESS THAN 70 milligrams/deciliter  acetaminophen   Tablet. 650 milliGRAM(s) Oral every 6 hours PRN Mild Pain (1 - 3)  traMADol 50 milliGRAM(s) Oral every 6 hours PRN Moderate Pain (4 - 6)  polyethylene glycol 3350 17 Gram(s) Oral daily PRN Constipation  oxyCODONE    5 mG/acetaminophen 325 mG 2 Tablet(s) Oral every 6 hours PRN Severe Pain (7 - 10)  magnesium hydroxide Suspension 30 milliLiter(s) Oral daily PRN Constipation

## 2017-08-04 NOTE — DISCHARGE NOTE ADULT - CARE PROVIDERS DIRECT ADDRESSES
[email or web address]

## 2017-08-04 NOTE — DISCHARGE NOTE ADULT - MEDICATION SUMMARY - MEDICATIONS TO CHANGE
I will SWITCH the dose or number of times a day I take the medications listed below when I get home from the hospital:    Metoprolol Succinate ER 50 mg oral tablet, extended release  -- 1 tab(s) by mouth once a day

## 2017-08-04 NOTE — DISCHARGE NOTE ADULT - MEDICATION SUMMARY - MEDICATIONS TO TAKE
I will START or STAY ON the medications listed below when I get home from the hospital:    coq 10  -- 200 milligram(s)  once a day  -- Indication: For vitamin    acetaminophen 325 mg oral tablet  -- 2 tab(s) by mouth every 6 hours, As needed, Mild Pain (1 - 3)  -- Indication: For Mild pain    acetaminophen-oxycodone 325 mg-5 mg oral tablet  -- 2 tab(s) by mouth every 4 hours, As needed, Severe Pain (7 - 10) MDD:8 tabs  -- Indication: For  moderate to severe pain    Aspirin Enteric Coated 81 mg oral delayed release tablet  -- 1 tab(s) by mouth once a day  -- Indication: For heart disease    tamsulosin 0.4 mg oral capsule  -- 1 cap(s) by mouth once a day  -- Indication: For enlarged prostate     amiodarone 200 mg oral tablet  -- 1 tab(s) by mouth once a day  -- Indication: For To keep your heart rhythm regular    Eliquis 5 mg oral tablet  -- 1 tab(s) by mouth 2 times a day  -- Check with your doctor before becoming pregnant.  It is very important that you take or use this exactly as directed.  Do not skip doses or discontinue unless directed by your doctor.  Obtain medical advice before taking any non-prescription drugs as some may affect the action of this medication.    -- Indication: For Blood thinner to prevent clot/stroke    atorvastatin 40 mg oral tablet  -- 1 tab(s) by mouth once a day  -- Indication: For Cholesterol    bicalutamide 50 mg oral tablet  -- 1 tab(s) by mouth every 24 hours  -- Indication: For Prostate cancer    metoprolol tartrate 50 mg oral tablet  -- 1 tab(s) by mouth every 12 hours  -- Indication: For Blood pressure/heart rate control    furosemide 40 mg oral tablet  -- 1 tab(s) by mouth once a day  -- Indication: For diuretic to help you urinate    Flax Seed Oil oral capsule  --  by mouth once a day  -- Indication: For vitamin    docusate sodium 100 mg oral capsule  -- 1 cap(s) by mouth 3 times a day as needed to prevent constipation.  Hold for loose or frequent stool  -- Indication: For Stool softener    potassium chloride 10 mEq oral tablet, extended release  -- 1 tab(s) by mouth once a day while taking lasix  -- Indication: For Supplement while on lasix    Fish Oil 1200 mg oral capsule  -- 1  by mouth once a day  -- Indication: For vitamin    Calcium 500+D oral tablet, chewable  -- 1  by mouth once a day  -- Indication: For vitamin    Vitamin B12 500 mcg oral tablet  -- 1 tab(s) by mouth once a day  -- Indication: For vitamin    Vitamin B6 100 mg oral tablet  -- 1 tab(s) by mouth once a day  -- Indication: For vitamin    vitamin E 400 intl units oral capsule  -- 1 cap(s) by mouth once a day  -- Indication: For vitamin    Vitamin D2 50,000 intl units (1.25 mg) oral capsule  -- 1 cap(s) by mouth once a week  -- Indication: For vitamin    Vitamin D3 1000 intl units oral tablet  -- 1 tab(s) by mouth once a day  -- Indication: For vitamin

## 2017-08-04 NOTE — DISCHARGE NOTE ADULT - PLAN OF CARE
To recover from surgery -Please follow up with Dr. Fletcher on Wednesday 8/16/17 @ 11:30am.  The office is located at [facility], [address] [place] [address], [facility], 4th floor. Call us with any questions #[phone].    -Walk daily as tolerated and use your incentive spirometer every hour.    -No driving or strenuous activity/exercise for 6 weeks, or until cleared by your surgeon.    -Gently clean your incisions with anti-bacterial soap and water, pat dry.  You may leave them open to air.    -Call your doctor if you have shortness of breath, chest pain not relieved by pain medication, dizziness, fever >101.5, or increased redness or drainage from incisions.

## 2017-08-04 NOTE — DISCHARGE NOTE ADULT - PATIENT PORTAL LINK FT
“You can access the [employer] Patient Portal, offered by [facility], by registering with the following website: [email or web address]”

## 2017-08-04 NOTE — DISCHARGE NOTE ADULT - CARE PROVIDER_API CALL
Adam Fletcher (MD), Surgery; Thoracic and Cardiac Surgery  [address]  [address] [facility] [place] [address]  Phone: [phone]  Fax: [phone]

## 2017-08-05 LAB
ANION GAP SERPL CALC-SCNC: 11 MMOL/L — SIGNIFICANT CHANGE UP (ref 5–17)
BUN SERPL-MCNC: 19 MG/DL — SIGNIFICANT CHANGE UP (ref 7–23)
CALCIUM SERPL-MCNC: 8.4 MG/DL — SIGNIFICANT CHANGE UP (ref 8.4–10.5)
CHLORIDE SERPL-SCNC: 100 MMOL/L — SIGNIFICANT CHANGE UP (ref 96–108)
CO2 SERPL-SCNC: 24 MMOL/L — SIGNIFICANT CHANGE UP (ref 22–31)
CREAT SERPL-MCNC: 1 MG/DL — SIGNIFICANT CHANGE UP (ref 0.5–1.3)
CULTURE RESULTS: SIGNIFICANT CHANGE UP
GLUCOSE SERPL-MCNC: 105 MG/DL — HIGH (ref 70–99)
HCT VFR BLD CALC: 27.2 % — LOW (ref 39–50)
HGB BLD-MCNC: 9.1 G/DL — LOW (ref 13–17)
MAGNESIUM SERPL-MCNC: 2.3 MG/DL — SIGNIFICANT CHANGE UP (ref 1.6–2.6)
MCHC RBC-ENTMCNC: 31.1 PG — SIGNIFICANT CHANGE UP (ref 27–34)
MCHC RBC-ENTMCNC: 33.5 G/DL — SIGNIFICANT CHANGE UP (ref 32–36)
MCV RBC AUTO: 92.8 FL — SIGNIFICANT CHANGE UP (ref 80–100)
PHOSPHATE SERPL-MCNC: 3.4 MG/DL — SIGNIFICANT CHANGE UP (ref 2.5–4.5)
PLATELET # BLD AUTO: 133 K/UL — LOW (ref 150–400)
POTASSIUM SERPL-MCNC: 4.2 MMOL/L — SIGNIFICANT CHANGE UP (ref 3.5–5.3)
POTASSIUM SERPL-SCNC: 4.2 MMOL/L — SIGNIFICANT CHANGE UP (ref 3.5–5.3)
RBC # BLD: 2.93 M/UL — LOW (ref 4.2–5.8)
RBC # FLD: 14 % — SIGNIFICANT CHANGE UP (ref 10.3–16.9)
SODIUM SERPL-SCNC: 135 MMOL/L — SIGNIFICANT CHANGE UP (ref 135–145)
SPECIMEN SOURCE: SIGNIFICANT CHANGE UP
WBC # BLD: 7.5 K/UL — SIGNIFICANT CHANGE UP (ref 3.8–10.5)
WBC # FLD AUTO: 7.5 K/UL — SIGNIFICANT CHANGE UP (ref 3.8–10.5)

## 2017-08-05 PROCEDURE — 71010: CPT | Mod: 26

## 2017-08-05 RX ORDER — FUROSEMIDE 40 MG
20 TABLET ORAL ONCE
Qty: 0 | Refills: 0 | Status: DISCONTINUED | OUTPATIENT
Start: 2017-08-05 | End: 2017-08-05

## 2017-08-05 RX ORDER — LACTULOSE 10 G/15ML
20 SOLUTION ORAL ONCE
Qty: 0 | Refills: 0 | Status: COMPLETED | OUTPATIENT
Start: 2017-08-05 | End: 2017-08-05

## 2017-08-05 RX ADMIN — LIDOCAINE 1 PATCH: 4 CREAM TOPICAL at 12:17

## 2017-08-05 RX ADMIN — TRAMADOL HYDROCHLORIDE 50 MILLIGRAM(S): 50 TABLET ORAL at 10:00

## 2017-08-05 RX ADMIN — OXYCODONE AND ACETAMINOPHEN 2 TABLET(S): 5; 325 TABLET ORAL at 13:09

## 2017-08-05 RX ADMIN — OXYCODONE AND ACETAMINOPHEN 2 TABLET(S): 5; 325 TABLET ORAL at 05:45

## 2017-08-05 RX ADMIN — Medication 12.5 MILLIGRAM(S): at 05:09

## 2017-08-05 RX ADMIN — AMIODARONE HYDROCHLORIDE 200 MILLIGRAM(S): 400 TABLET ORAL at 05:08

## 2017-08-05 RX ADMIN — OXYCODONE AND ACETAMINOPHEN 2 TABLET(S): 5; 325 TABLET ORAL at 04:46

## 2017-08-05 RX ADMIN — LACTULOSE 20 GRAM(S): 10 SOLUTION ORAL at 17:37

## 2017-08-05 RX ADMIN — TRAMADOL HYDROCHLORIDE 50 MILLIGRAM(S): 50 TABLET ORAL at 09:39

## 2017-08-05 RX ADMIN — HEPARIN SODIUM 5000 UNIT(S): 5000 INJECTION INTRAVENOUS; SUBCUTANEOUS at 17:36

## 2017-08-05 RX ADMIN — ATORVASTATIN CALCIUM 40 MILLIGRAM(S): 80 TABLET, FILM COATED ORAL at 22:21

## 2017-08-05 RX ADMIN — OXYCODONE AND ACETAMINOPHEN 2 TABLET(S): 5; 325 TABLET ORAL at 18:37

## 2017-08-05 RX ADMIN — SENNA PLUS 2 TABLET(S): 8.6 TABLET ORAL at 22:21

## 2017-08-05 RX ADMIN — FAMOTIDINE 20 MILLIGRAM(S): 10 INJECTION INTRAVENOUS at 17:36

## 2017-08-05 RX ADMIN — FAMOTIDINE 20 MILLIGRAM(S): 10 INJECTION INTRAVENOUS at 05:09

## 2017-08-05 RX ADMIN — LIDOCAINE 1 PATCH: 4 CREAM TOPICAL at 22:22

## 2017-08-05 RX ADMIN — LIDOCAINE 1 PATCH: 4 CREAM TOPICAL at 01:15

## 2017-08-05 RX ADMIN — Medication 81 MILLIGRAM(S): at 12:17

## 2017-08-05 RX ADMIN — HEPARIN SODIUM 5000 UNIT(S): 5000 INJECTION INTRAVENOUS; SUBCUTANEOUS at 22:21

## 2017-08-05 RX ADMIN — TAMSULOSIN HYDROCHLORIDE 0.4 MILLIGRAM(S): 0.4 CAPSULE ORAL at 22:21

## 2017-08-05 RX ADMIN — BICALUTAMIDE 50 MILLIGRAM(S): 50 TABLET, FILM COATED ORAL at 17:38

## 2017-08-05 RX ADMIN — TRAMADOL HYDROCHLORIDE 50 MILLIGRAM(S): 50 TABLET ORAL at 01:27

## 2017-08-05 RX ADMIN — Medication 100 MILLIGRAM(S): at 22:21

## 2017-08-05 RX ADMIN — OXYCODONE AND ACETAMINOPHEN 2 TABLET(S): 5; 325 TABLET ORAL at 12:27

## 2017-08-05 RX ADMIN — Medication 100 MILLIGRAM(S): at 05:09

## 2017-08-05 RX ADMIN — TRAMADOL HYDROCHLORIDE 50 MILLIGRAM(S): 50 TABLET ORAL at 02:00

## 2017-08-05 RX ADMIN — Medication 100 MILLIGRAM(S): at 17:35

## 2017-08-05 RX ADMIN — OXYCODONE AND ACETAMINOPHEN 2 TABLET(S): 5; 325 TABLET ORAL at 19:00

## 2017-08-05 RX ADMIN — HEPARIN SODIUM 5000 UNIT(S): 5000 INJECTION INTRAVENOUS; SUBCUTANEOUS at 05:08

## 2017-08-05 RX ADMIN — Medication 12.5 MILLIGRAM(S): at 17:35

## 2017-08-05 NOTE — PROGRESS NOTE ADULT - ASSESSMENT
Assessment    Plan:    Neurovascular: No delirium. Pain management on lidocaine patch, tylenol, tramadol and percocet prn    Cardiovascular: Hemodynamically stable. HR controlled on amiodarone 200mg daily, metoprolol 12.5mg q12hr  -ASA, atorvastatin      Respiratory:   02 Sat = 98% on RA.  -Encourage C+DB and Use of IS 10x / hr while awake.  -ambulate    GI: Stable.  -PPX.  -PO Diet  -Bowel regimen on colace/senna    Renal / :  -Monitor renal function.  -Monitor I/O's.  -continue bicalutamide 50mg daily, flomax hs    Endocrine:    -fsg well controlled on insulin sliding scale    Hematologic:  -CBC.  -Coagulation Panel.    ID:  -Temperature.  -CBC.    Prophylaxis:  -DVT prophylaxis with 5000 SubQ Heparin q8h.  -SCD's    Disposition:  -Discharge Sunday/Monday (need VNS with longer hours) Assessment      Plan:    Neurovascular: No delirium. Pain management on lidocaine patch, tylenol, tramadol and percocet prn    Cardiovascular: Hemodynamically stable. HR controlled on amiodarone 200mg daily, metoprolol 12.5mg q12hr  -ASA, atorvastatin      Respiratory:   02 Sat = 98% on RA.  -Encourage C+DB and Use of IS 10x / hr while awake.  -ambulate    GI: Stable.  -PPX.  -PO Diet  -Bowel regimen on colace/senna    Renal / :  -Monitor renal function.  -Monitor I/O's.  -continue bicalutamide 50mg daily, flomax hs    Endocrine:    -fsg well controlled on insulin sliding scale    Hematologic:  -CBC.  -Coagulation Panel.    ID:  -Temperature.  -CBC.    Prophylaxis:  -DVT prophylaxis with 5000 SubQ Heparin q8h.  -SCD's    Disposition:  -Discharge Sunday/Monday (need VNS with longer hours) Assessment  71 year old male, former smoker, with pmHx of HTN, Systolic CHF (EF 40%), MI @ [place] in 2016 (tx medically), questionable Afib (on aspirin only per patient), recurrent prostate cancer with metastasis to the vertebrae and lungs (dx 2011) s/p ADT and EBRT was recently advised to have another ADT/monotherapy with high dose androgen blocker for his prostate cancer, but refused the treatment now POD#5 s/p valve sparing aortic root and hemiarch replacement on 7/31/17, afib post op started on amiodarone now NSR and tolerating beta blocker.    Plan:    Neurovascular:   Pain management on lidocaine patch, tylenol, tramadol and percocet prn    Cardiovascular: Hemodynamically stable. HR controlled on amiodarone 200mg daily, metoprolol 12.5mg q12hr  -ASA, atorvastatin      Respiratory:   02 Sat = 98% on RA.  -Encourage C+DB and Use of IS 10x / hr while awake.  -ambulate    GI: Stable.  -PPX.  -PO Diet  -Bowel regimen on colace/senna    Renal / :  -Monitor renal function.  -Monitor I/O's.  -continue bicalutamide 50mg daily, flomax hs    Endocrine:    -fsg well controlled on insulin sliding scale    Hematologic:  -CBC.  -Coagulation Panel.    ID:  -Temperature.  -CBC.    Prophylaxis:  -DVT prophylaxis with 5000 SubQ Heparin q8h.  -SCD's    Disposition:  -Discharge Sunday/Monday (need VNS with longer hours) Assessment  71 year old male, former smoker, with pmHx of HTN, Systolic CHF (EF 40%), MI @ [place] in 2016 (tx medically), questionable Afib (on aspirin only per patient), recurrent prostate cancer with metastasis to the vertebrae and lungs (dx 2011) s/p ADT and EBRT was recently advised to have another ADT/monotherapy with high dose androgen blocker for his prostate cancer, but refused the treatment now POD#5 s/p valve sparing aortic root and hemiarch replacement on 7/31/17, afib post op started on amiodarone now NSR and tolerating beta blocker.    Plan:    Neurovascular:   Pain management on lidocaine patch, tylenol, tramadol and percocet prn    Cardiovascular: Hemodynamically stable. HR controlled on amiodarone 200mg daily, metoprolol 12.5mg q12hr  -ASA, atorvastatin    -continue V wires on back up at 40bpm    Respiratory:   02 Sat = 98% on RA.  -Encourage C+DB and Use of IS 10x / hr while awake.  -ambulate    GI: Stable.  -PPX.  -PO Diet  -Bowel regimen on colace/senna  -lactulose for constipation    Renal / :  -Monitor renal function.  -Monitor I/O's.  -continue bicalutamide 50mg daily, flomax hs    Endocrine:    -fsg well controlled on insulin sliding scale    Hematologic:  -CBC.  -Coagulation Panel.    ID:  -Temperature.  -CBC.    Prophylaxis:  -DVT prophylaxis with 5000 SubQ Heparin q8h.  -SCD's    Disposition:  -Discharge Sunday/Monday (need VNS with longer hours)

## 2017-08-05 NOTE — PROGRESS NOTE ADULT - SUBJECTIVE AND OBJECTIVE BOX
Patient discussed on morning rounds with Dr. Corbin    Operation / Date:     SUBJECTIVE ASSESSMENT:  71y Male       Vital Signs Last 24 Hrs  T(C): 35.9 (05 Aug 2017 09:00), Max: 36.7 (04 Aug 2017 14:19)  T(F): 96.6 (05 Aug 2017 09:00), Max: 98.1 (04 Aug 2017 14:19)  HR: 66 (05 Aug 2017 10:18) (66 - 80)  BP: 130/72 (05 Aug 2017 09:44) (126/88 - 142/66)  BP(mean): 94 (05 Aug 2017 09:44) (92 - 108)  RR: 18 (05 Aug 2017 10:18) (18 - 22)  SpO2: 96% (05 Aug 2017 10:18) (92% - 100%)  I&O's Detail    04 Aug 2017 07:01  -  05 Aug 2017 07:00  --------------------------------------------------------  IN:    Oral Fluid: 730 mL  Total IN: 730 mL    OUT:    Bulb: 30 mL    Voided: 850 mL  Total OUT: 880 mL    Total NET: -150 mL      05 Aug 2017 07:01  -  05 Aug 2017 10:52  --------------------------------------------------------  IN:    Oral Fluid: 325 mL  Total IN: 325 mL    OUT:    Voided: 200 mL  Total OUT: 200 mL    Total NET: 125 mL    CHEST TUBE: No   JAM DRAIN:  Yes/No.  EPICARDIAL WIRES: Yes/No.  TIE DOWNS: Yes/No.  PAREDES:No.    PHYSICAL EXAM:    General:     Neurological:    Cardiovascular:    Respiratory:    Gastrointestinal:    Extremities:    Vascular:    Incision Sites:    LABS:                        9.1    7.5   )-----------( 133      ( 05 Aug 2017 05:49 )             27.2       COUMADIN:  Yes/No. REASON: .    PT/INR - ( 04 Aug 2017 07:38 )   PT: 13.0 sec;   INR: 1.17          PTT - ( 04 Aug 2017 07:38 )  PTT:26.3 sec    08-05    135  |  100  |  19  ----------------------------<  105<H>  4.2   |  24  |  1.00    Ca    8.4      05 Aug 2017 05:54  Phos  3.4     08-05  Mg     2.3     08-05    MEDICATIONS  (STANDING):  sodium chloride 0.45%. 1000 milliLiter(s) (10 mL/Hr) IV Continuous <Continuous>  heparin  Injectable 5000 Unit(s) SubCutaneous every 8 hours  tamsulosin 0.4 milliGRAM(s) Oral at bedtime  atorvastatin 40 milliGRAM(s) Oral at bedtime  insulin lispro (HumaLOG) corrective regimen sliding scale   SubCutaneous Before meals and at bedtime  dextrose 5%. 1000 milliLiter(s) (50 mL/Hr) IV Continuous <Continuous>  dextrose 50% Injectable 12.5 Gram(s) IV Push once  dextrose 50% Injectable 25 Gram(s) IV Push once  dextrose 50% Injectable 25 Gram(s) IV Push once  famotidine    Tablet 20 milliGRAM(s) Oral two times a day  aspirin enteric coated 81 milliGRAM(s) Oral daily  lidocaine   Patch 1 Patch Transdermal daily  docusate sodium 100 milliGRAM(s) Oral three times a day  senna 2 Tablet(s) Oral at bedtime  bicalutamide 50 milliGRAM(s) Oral daily  amiodarone    Tablet 200 milliGRAM(s) Oral daily  metoprolol 12.5 milliGRAM(s) Oral every 12 hours    MEDICATIONS  (PRN):  dextrose Gel 1 Dose(s) Oral once PRN Blood Glucose LESS THAN 70 milliGRAM(s)/deciliter  glucagon  Injectable 1 milliGRAM(s) IntraMuscular once PRN Glucose LESS THAN 70 milligrams/deciliter  acetaminophen   Tablet. 650 milliGRAM(s) Oral every 6 hours PRN Mild Pain (1 - 3)  traMADol 50 milliGRAM(s) Oral every 6 hours PRN Moderate Pain (4 - 6)  polyethylene glycol 3350 17 Gram(s) Oral daily PRN Constipation  oxyCODONE    5 mG/acetaminophen 325 mG 2 Tablet(s) Oral every 6 hours PRN Severe Pain (7 - 10)  magnesium hydroxide Suspension 30 milliLiter(s) Oral daily PRN Constipation        RADIOLOGY & ADDITIONAL TESTS:  < from: Xray Chest 1 View AP -PORTABLE-Routine (08.05.17 @ 07:09) >  Impression: Congestion and/or infiltrates. Bilateral effusions    < end of copied text > Patient discussed on morning rounds with Dr. Corbin    Operation / Date:valve sparing aortic root and hemiarch replacement on 7/31/17    SUBJECTIVE ASSESSMENT:  71 year old male complains of left shoulder pain and on closer exam seems to be muscular focused left neck, left back.  Will continue pain management of percocet/tramadol/tylenol/lidocaine and will add warm compress to the areas affected.    Vital Signs Last 24 Hrs  T(C): 35.9 (05 Aug 2017 09:00), Max: 36.7 (04 Aug 2017 14:19)  T(F): 96.6 (05 Aug 2017 09:00), Max: 98.1 (04 Aug 2017 14:19)  HR: 66 (05 Aug 2017 10:18) (66 - 80)  BP: 130/72 (05 Aug 2017 09:44) (126/88 - 142/66)  BP(mean): 94 (05 Aug 2017 09:44) (92 - 108)  RR: 18 (05 Aug 2017 10:18) (18 - 22)  SpO2: 96% (05 Aug 2017 10:18) (92% - 100%)  I&O's Detail    04 Aug 2017 07:01  -  05 Aug 2017 07:00  --------------------------------------------------------  IN:    Oral Fluid: 730 mL  Total IN: 730 mL    OUT:    Bulb: 30 mL    Voided: 850 mL  Total OUT: 880 mL    Total NET: -150 mL      05 Aug 2017 07:01  -  05 Aug 2017 10:52  --------------------------------------------------------  IN:    Oral Fluid: 325 mL  Total IN: 325 mL    OUT:    Voided: 200 mL  Total OUT: 200 mL    Total NET: 125 mL    CHEST TUBE: No   JAM DRAIN:  Yes  EPICARDIAL WIRES: Yes  TIE DOWNS:No.  PAREDES:No.    PHYSICAL EXAM:    General:patient seen sitting out of bed in a chair a little agitated about his left shoulder pain.    Neurological:alert and orientedx 3, no gross deficit appreciated    Cardiovascular:RRR    Respiratory:    Gastrointestinal:    Extremities:    Vascular:    Incision Sites:    LABS:                        9.1    7.5   )-----------( 133      ( 05 Aug 2017 05:49 )             27.2       COUMADIN:  Yes/No. REASON: .    PT/INR - ( 04 Aug 2017 07:38 )   PT: 13.0 sec;   INR: 1.17          PTT - ( 04 Aug 2017 07:38 )  PTT:26.3 sec    08-05    135  |  100  |  19  ----------------------------<  105<H>  4.2   |  24  |  1.00    Ca    8.4      05 Aug 2017 05:54  Phos  3.4     08-05  Mg     2.3     08-05    MEDICATIONS  (STANDING):  sodium chloride 0.45%. 1000 milliLiter(s) (10 mL/Hr) IV Continuous <Continuous>  heparin  Injectable 5000 Unit(s) SubCutaneous every 8 hours  tamsulosin 0.4 milliGRAM(s) Oral at bedtime  atorvastatin 40 milliGRAM(s) Oral at bedtime  insulin lispro (HumaLOG) corrective regimen sliding scale   SubCutaneous Before meals and at bedtime  dextrose 5%. 1000 milliLiter(s) (50 mL/Hr) IV Continuous <Continuous>  dextrose 50% Injectable 12.5 Gram(s) IV Push once  dextrose 50% Injectable 25 Gram(s) IV Push once  dextrose 50% Injectable 25 Gram(s) IV Push once  famotidine    Tablet 20 milliGRAM(s) Oral two times a day  aspirin enteric coated 81 milliGRAM(s) Oral daily  lidocaine   Patch 1 Patch Transdermal daily  docusate sodium 100 milliGRAM(s) Oral three times a day  senna 2 Tablet(s) Oral at bedtime  bicalutamide 50 milliGRAM(s) Oral daily  amiodarone    Tablet 200 milliGRAM(s) Oral daily  metoprolol 12.5 milliGRAM(s) Oral every 12 hours    MEDICATIONS  (PRN):  dextrose Gel 1 Dose(s) Oral once PRN Blood Glucose LESS THAN 70 milliGRAM(s)/deciliter  glucagon  Injectable 1 milliGRAM(s) IntraMuscular once PRN Glucose LESS THAN 70 milligrams/deciliter  acetaminophen   Tablet. 650 milliGRAM(s) Oral every 6 hours PRN Mild Pain (1 - 3)  traMADol 50 milliGRAM(s) Oral every 6 hours PRN Moderate Pain (4 - 6)  polyethylene glycol 3350 17 Gram(s) Oral daily PRN Constipation  oxyCODONE    5 mG/acetaminophen 325 mG 2 Tablet(s) Oral every 6 hours PRN Severe Pain (7 - 10)  magnesium hydroxide Suspension 30 milliLiter(s) Oral daily PRN Constipation      RADIOLOGY & ADDITIONAL TESTS:  < from: Xray Chest 1 View AP -PORTABLE-Routine (08.05.17 @ 07:09) >  Impression: Congestion and/or infiltrates. Bilateral effusions    < end of copied text > Patient discussed on morning rounds with Dr. Corbin    Operation / Date:valve sparing aortic root and hemiarch replacement on 7/31/17    SUBJECTIVE ASSESSMENT:  71 year old male complains of left shoulder pain and on closer exam seems to be muscular focused left neck, left back.  Will continue pain management of percocet/tramadol/tylenol/lidocaine and will add warm compress to the areas affected.    Vital Signs Last 24 Hrs  T(C): 35.9 (05 Aug 2017 09:00), Max: 36.7 (04 Aug 2017 14:19)  T(F): 96.6 (05 Aug 2017 09:00), Max: 98.1 (04 Aug 2017 14:19)  HR: 66 (05 Aug 2017 10:18) (66 - 80)  BP: 130/72 (05 Aug 2017 09:44) (126/88 - 142/66)  BP(mean): 94 (05 Aug 2017 09:44) (92 - 108)  RR: 18 (05 Aug 2017 10:18) (18 - 22)  SpO2: 96% (05 Aug 2017 10:18) (92% - 100%)  I&O's Detail    04 Aug 2017 07:01  -  05 Aug 2017 07:00  --------------------------------------------------------  IN:    Oral Fluid: 730 mL  Total IN: 730 mL    OUT:    Bulb: 30 mL    Voided: 850 mL  Total OUT: 880 mL    Total NET: -150 mL      05 Aug 2017 07:01  -  05 Aug 2017 10:52  --------------------------------------------------------  IN:    Oral Fluid: 325 mL  Total IN: 325 mL    OUT:    Voided: 200 mL  Total OUT: 200 mL    Total NET: 125 mL    CHEST TUBE: No   JAM DRAIN:  Yes  10ml x24hrs  EPICARDIAL WIRES: Yes V-wires at 40bpm backup  TIE DOWNS:No.  PAREDES:No.    PHYSICAL EXAM:    General:patient seen sitting out of bed in a chair a little agitated about his left shoulder pain.    Neurological:alert and orientedx 3, no gross deficit appreciated    Cardiovascular:RRR, S1S2, 2/6 holosystolic murmur LSB, +V Wires    Respiratory:equal air entry, no wheezing, no rhonci    Gastrointestinal:soft, nontender, mildly distended, positive bowel sounds    Extremities:bilateral LE edema 2+, warm    Vascular:all pulses intact (radial/femoral/DP 2+)    Incision Sites:sternotomy incision C/D/I no fluctuance, no erythema, blakex1 in place    LABS:                        9.1    7.5   )-----------( 133      ( 05 Aug 2017 05:49 )             27.2     COUMADIN: No. REASON: .    PT/INR - ( 04 Aug 2017 07:38 )   PT: 13.0 sec;   INR: 1.17          PTT - ( 04 Aug 2017 07:38 )  PTT:26.3 sec    08-05    135  |  100  |  19  ----------------------------<  105<H>  4.2   |  24  |  1.00    Ca    8.4      05 Aug 2017 05:54  Phos  3.4     08-05  Mg     2.3     08-05    MEDICATIONS  (STANDING):  sodium chloride 0.45%. 1000 milliLiter(s) (10 mL/Hr) IV Continuous <Continuous>  heparin  Injectable 5000 Unit(s) SubCutaneous every 8 hours  tamsulosin 0.4 milliGRAM(s) Oral at bedtime  atorvastatin 40 milliGRAM(s) Oral at bedtime  insulin lispro (HumaLOG) corrective regimen sliding scale   SubCutaneous Before meals and at bedtime  dextrose 5%. 1000 milliLiter(s) (50 mL/Hr) IV Continuous <Continuous>  dextrose 50% Injectable 12.5 Gram(s) IV Push once  dextrose 50% Injectable 25 Gram(s) IV Push once  dextrose 50% Injectable 25 Gram(s) IV Push once  famotidine    Tablet 20 milliGRAM(s) Oral two times a day  aspirin enteric coated 81 milliGRAM(s) Oral daily  lidocaine   Patch 1 Patch Transdermal daily  docusate sodium 100 milliGRAM(s) Oral three times a day  senna 2 Tablet(s) Oral at bedtime  bicalutamide 50 milliGRAM(s) Oral daily  amiodarone    Tablet 200 milliGRAM(s) Oral daily  metoprolol 12.5 milliGRAM(s) Oral every 12 hours    MEDICATIONS  (PRN):  dextrose Gel 1 Dose(s) Oral once PRN Blood Glucose LESS THAN 70 milliGRAM(s)/deciliter  glucagon  Injectable 1 milliGRAM(s) IntraMuscular once PRN Glucose LESS THAN 70 milligrams/deciliter  acetaminophen   Tablet. 650 milliGRAM(s) Oral every 6 hours PRN Mild Pain (1 - 3)  traMADol 50 milliGRAM(s) Oral every 6 hours PRN Moderate Pain (4 - 6)  polyethylene glycol 3350 17 Gram(s) Oral daily PRN Constipation  oxyCODONE    5 mG/acetaminophen 325 mG 2 Tablet(s) Oral every 6 hours PRN Severe Pain (7 - 10)  magnesium hydroxide Suspension 30 milliLiter(s) Oral daily PRN Constipation      RADIOLOGY & ADDITIONAL TESTS:  < from: Xray Chest 1 View AP -PORTABLE-Routine (08.05.17 @ 07:09) >  Impression: Congestion and/or infiltrates. Bilateral effusions    < end of copied text >

## 2017-08-06 LAB
ANION GAP SERPL CALC-SCNC: 14 MMOL/L — SIGNIFICANT CHANGE UP (ref 5–17)
APTT BLD: 26.5 SEC — LOW (ref 27.5–37.4)
BUN SERPL-MCNC: 15 MG/DL — SIGNIFICANT CHANGE UP (ref 7–23)
CALCIUM SERPL-MCNC: 9 MG/DL — SIGNIFICANT CHANGE UP (ref 8.4–10.5)
CHLORIDE SERPL-SCNC: 98 MMOL/L — SIGNIFICANT CHANGE UP (ref 96–108)
CO2 SERPL-SCNC: 25 MMOL/L — SIGNIFICANT CHANGE UP (ref 22–31)
CREAT SERPL-MCNC: 1 MG/DL — SIGNIFICANT CHANGE UP (ref 0.5–1.3)
GLUCOSE SERPL-MCNC: 102 MG/DL — HIGH (ref 70–99)
HCT VFR BLD CALC: 30.2 % — LOW (ref 39–50)
HGB BLD-MCNC: 10.2 G/DL — LOW (ref 13–17)
INR BLD: 1.16 — SIGNIFICANT CHANGE UP (ref 0.88–1.16)
MAGNESIUM SERPL-MCNC: 2.2 MG/DL — SIGNIFICANT CHANGE UP (ref 1.6–2.6)
MCHC RBC-ENTMCNC: 31.5 PG — SIGNIFICANT CHANGE UP (ref 27–34)
MCHC RBC-ENTMCNC: 33.8 G/DL — SIGNIFICANT CHANGE UP (ref 32–36)
MCV RBC AUTO: 93.2 FL — SIGNIFICANT CHANGE UP (ref 80–100)
PHOSPHATE SERPL-MCNC: 3.6 MG/DL — SIGNIFICANT CHANGE UP (ref 2.5–4.5)
PLATELET # BLD AUTO: 176 K/UL — SIGNIFICANT CHANGE UP (ref 150–400)
POTASSIUM SERPL-MCNC: 4.3 MMOL/L — SIGNIFICANT CHANGE UP (ref 3.5–5.3)
POTASSIUM SERPL-SCNC: 4.3 MMOL/L — SIGNIFICANT CHANGE UP (ref 3.5–5.3)
PROTHROM AB SERPL-ACNC: 12.9 SEC — HIGH (ref 9.8–12.7)
RBC # BLD: 3.24 M/UL — LOW (ref 4.2–5.8)
RBC # FLD: 13.6 % — SIGNIFICANT CHANGE UP (ref 10.3–16.9)
SODIUM SERPL-SCNC: 137 MMOL/L — SIGNIFICANT CHANGE UP (ref 135–145)
WBC # BLD: 8.4 K/UL — SIGNIFICANT CHANGE UP (ref 3.8–10.5)
WBC # FLD AUTO: 8.4 K/UL — SIGNIFICANT CHANGE UP (ref 3.8–10.5)

## 2017-08-06 PROCEDURE — 71010: CPT | Mod: 26

## 2017-08-06 PROCEDURE — 71010: CPT | Mod: 26,77

## 2017-08-06 PROCEDURE — 32551 INSERTION OF CHEST TUBE: CPT

## 2017-08-06 RX ORDER — METOPROLOL TARTRATE 50 MG
25 TABLET ORAL ONCE
Qty: 0 | Refills: 0 | Status: COMPLETED | OUTPATIENT
Start: 2017-08-06 | End: 2017-08-06

## 2017-08-06 RX ORDER — METOCLOPRAMIDE HCL 10 MG
10 TABLET ORAL ONCE
Qty: 0 | Refills: 0 | Status: DISCONTINUED | OUTPATIENT
Start: 2017-08-06 | End: 2017-08-10

## 2017-08-06 RX ORDER — LIDOCAINE 4 G/100G
1 CREAM TOPICAL DAILY
Qty: 0 | Refills: 0 | Status: DISCONTINUED | OUTPATIENT
Start: 2017-08-06 | End: 2017-08-10

## 2017-08-06 RX ORDER — FENTANYL CITRATE 50 UG/ML
12.5 INJECTION INTRAVENOUS ONCE
Qty: 0 | Refills: 0 | Status: DISCONTINUED | OUTPATIENT
Start: 2017-08-06 | End: 2017-08-10

## 2017-08-06 RX ORDER — METOPROLOL TARTRATE 50 MG
25 TABLET ORAL EVERY 12 HOURS
Qty: 0 | Refills: 0 | Status: DISCONTINUED | OUTPATIENT
Start: 2017-08-06 | End: 2017-08-07

## 2017-08-06 RX ORDER — FENTANYL CITRATE 50 UG/ML
12.5 INJECTION INTRAVENOUS ONCE
Qty: 0 | Refills: 0 | Status: DISCONTINUED | OUTPATIENT
Start: 2017-08-06 | End: 2017-08-06

## 2017-08-06 RX ADMIN — OXYCODONE AND ACETAMINOPHEN 2 TABLET(S): 5; 325 TABLET ORAL at 13:57

## 2017-08-06 RX ADMIN — Medication 100 MILLIGRAM(S): at 13:58

## 2017-08-06 RX ADMIN — OXYCODONE AND ACETAMINOPHEN 2 TABLET(S): 5; 325 TABLET ORAL at 22:25

## 2017-08-06 RX ADMIN — OXYCODONE AND ACETAMINOPHEN 2 TABLET(S): 5; 325 TABLET ORAL at 06:16

## 2017-08-06 RX ADMIN — TAMSULOSIN HYDROCHLORIDE 0.4 MILLIGRAM(S): 0.4 CAPSULE ORAL at 21:49

## 2017-08-06 RX ADMIN — HEPARIN SODIUM 5000 UNIT(S): 5000 INJECTION INTRAVENOUS; SUBCUTANEOUS at 21:49

## 2017-08-06 RX ADMIN — Medication 25 MILLIGRAM(S): at 06:21

## 2017-08-06 RX ADMIN — Medication 81 MILLIGRAM(S): at 13:59

## 2017-08-06 RX ADMIN — Medication 2: at 17:16

## 2017-08-06 RX ADMIN — SENNA PLUS 2 TABLET(S): 8.6 TABLET ORAL at 21:49

## 2017-08-06 RX ADMIN — AMIODARONE HYDROCHLORIDE 200 MILLIGRAM(S): 400 TABLET ORAL at 05:59

## 2017-08-06 RX ADMIN — HEPARIN SODIUM 5000 UNIT(S): 5000 INJECTION INTRAVENOUS; SUBCUTANEOUS at 05:58

## 2017-08-06 RX ADMIN — FENTANYL CITRATE 12.5 MICROGRAM(S): 50 INJECTION INTRAVENOUS at 16:30

## 2017-08-06 RX ADMIN — Medication 2: at 21:50

## 2017-08-06 RX ADMIN — ATORVASTATIN CALCIUM 40 MILLIGRAM(S): 80 TABLET, FILM COATED ORAL at 21:49

## 2017-08-06 RX ADMIN — OXYCODONE AND ACETAMINOPHEN 2 TABLET(S): 5; 325 TABLET ORAL at 00:23

## 2017-08-06 RX ADMIN — FENTANYL CITRATE 12.5 MICROGRAM(S): 50 INJECTION INTRAVENOUS at 15:30

## 2017-08-06 RX ADMIN — Medication 0.5 MILLIGRAM(S): at 21:50

## 2017-08-06 RX ADMIN — OXYCODONE AND ACETAMINOPHEN 2 TABLET(S): 5; 325 TABLET ORAL at 21:50

## 2017-08-06 RX ADMIN — Medication 25 MILLIGRAM(S): at 19:28

## 2017-08-06 RX ADMIN — Medication 100 MILLIGRAM(S): at 05:59

## 2017-08-06 RX ADMIN — OXYCODONE AND ACETAMINOPHEN 2 TABLET(S): 5; 325 TABLET ORAL at 00:22

## 2017-08-06 RX ADMIN — Medication 100 MILLIGRAM(S): at 21:49

## 2017-08-06 RX ADMIN — FAMOTIDINE 20 MILLIGRAM(S): 10 INJECTION INTRAVENOUS at 05:59

## 2017-08-06 RX ADMIN — OXYCODONE AND ACETAMINOPHEN 2 TABLET(S): 5; 325 TABLET ORAL at 13:13

## 2017-08-06 RX ADMIN — Medication 0.5 MILLIGRAM(S): at 15:57

## 2017-08-06 RX ADMIN — BICALUTAMIDE 50 MILLIGRAM(S): 50 TABLET, FILM COATED ORAL at 14:00

## 2017-08-06 RX ADMIN — HEPARIN SODIUM 5000 UNIT(S): 5000 INJECTION INTRAVENOUS; SUBCUTANEOUS at 13:59

## 2017-08-06 RX ADMIN — LIDOCAINE 1 PATCH: 4 CREAM TOPICAL at 13:59

## 2017-08-06 RX ADMIN — FENTANYL CITRATE 12.5 MICROGRAM(S): 50 INJECTION INTRAVENOUS at 15:17

## 2017-08-06 RX ADMIN — FENTANYL CITRATE 12.5 MICROGRAM(S): 50 INJECTION INTRAVENOUS at 16:00

## 2017-08-06 NOTE — PROGRESS NOTE ADULT - ASSESSMENT
Assessment  71 year old male, former smoker, with pmHx of HTN, Systolic CHF (EF 40%), MI @ [facility] in 2016 (tx medically), questionable Afib (on aspirin only per patient), recurrent prostate cancer with metastasis to the vertebrae and lungs (dx 2011) s/p ADT and EBRT was recently advised to have another ADT/monotherapy with high dose androgen blocker for his prostate cancer, but refused the treatment now POD#5 s/p valve sparing aortic root and hemiarch replacement on 7/31/17, afib post op started on amiodarone now NSR and tolerating beta blocker.    Plan:    Neurovascular:   Pain management on lidocaine patch, tylenol, tramadol and percocet prn    Cardiovascular: Hemodynamically stable. HR controlled on amiodarone 200mg daily, metoprolol increased to 25mg q12hr after burst of afib overnight    -ASA, atorvastatin    -Cut V wires     Respiratory:   02 Sat = 98% on RA.  -Encourage C+DB and Use of IS 10x / hr while awake.  -ambulate  -remove estela  -US chest for possible pigtail placement to drain effusion bilateral  GI: Stable.  -PPX.  -PO Diet  -Bowel regimen on colace/senna    Renal / :  -Monitor renal function.  -Monitor I/O's.  -continue bicalutamide 50mg daily, flomax hs    Endocrine:    -fsg well controlled on insulin sliding scale    Hematologic:  -CBC.  -Coagulation Panel.    ID:  -Temperature.  -CBC.    Prophylaxis:  -DVT prophylaxis with 5000 SubQ Heparin q8h.  -SCD's    Disposition:  -Discharge Monday (need VNS with longer hours)

## 2017-08-06 NOTE — PROCEDURE NOTE - NSPROCDETAILS_GEN_ALL_CORE
secured in place/percutaneous/ultrasound assessment of fluid (size)/sterile dressing applied/dressing applied/large/Seldinger technique

## 2017-08-06 NOTE — PROGRESS NOTE ADULT - SUBJECTIVE AND OBJECTIVE BOX
Patient discussed on morning rounds with Dr. Corbin    Operation / Date:valve sparing aortic root and hemiarch replacement on 7/31/17    SUBJECTIVE ASSESSMENT:  71year old male in much better spirits today, says his pain is much better, but he is having problem catching his breath especially when he walks.      Vital Signs Last 24 Hrs  T(C): 36.7 (06 Aug 2017 14:00), Max: 37.2 (05 Aug 2017 21:22)  T(F): 98.1 (06 Aug 2017 14:00), Max: 98.9 (05 Aug 2017 21:22)  HR: 80 (06 Aug 2017 14:00) (62 - 87)  BP: 101/56 (06 Aug 2017 14:00) (101/56 - 167/77)  BP(mean): 103 (06 Aug 2017 13:00) (82 - 115)  RR: 16 (06 Aug 2017 14:00) (16 - 18)  SpO2: 98% (06 Aug 2017 14:00) (95% - 98%)  I&O's Detail    05 Aug 2017 07:01  -  06 Aug 2017 07:00  --------------------------------------------------------  IN:    Oral Fluid: 1345 mL  Total IN: 1345 mL    OUT:    Bulb: 45 mL    Voided: 1590 mL  Total OUT: 1635 mL    Total NET: -290 mL      06 Aug 2017 07:01  -  06 Aug 2017 15:19  --------------------------------------------------------  IN:    Oral Fluid: 775 mL  Total IN: 775 mL    OUT:    Bulb: 40 mL    Chest Tube: 600 mL    Chest Tube: 750 mL    Voided: 500 mL  Total OUT: 1890 mL    Total NET: -1115 mL    CHEST TUBE: No   JAM DRAIN:  Yes  45ml x24hrs  EPICARDIAL WIRES: Yes V-wires at 40bpm backup  TIE DOWNS:No.  PAREDES:No.    PHYSICAL EXAM:    General:patient seen sitting out of bed in NAD    Neurological:alert and oriented x 3, no gross deficit appreciated    Cardiovascular:RRR, S1S2, 2/6 holosystolic murmur LSB, +V Wires    Respiratory:equal air entry, no wheezing, no rhonci    Gastrointestinal:soft, nontender, non-distended, positive bowel sounds    Extremities:bilateral LE edema 1+, warm    Vascular:all pulses intact (radial/femoral/DP 2+)    Incision Sites:sternotomy incision C/D/I no fluctuance, no erythema, blakex1 in place    LABS:                        10.2   8.4   )-----------( 176      ( 06 Aug 2017 06:14 )             30.2     COUMADIN: No. REASON: .    PT/INR - ( 06 Aug 2017 06:14 )   PT: 12.9 sec;   INR: 1.16       PTT - ( 06 Aug 2017 06:14 )  PTT:26.5 sec    08-06    137  |  98  |  15  ----------------------------<  102<H>  4.3   |  25  |  1.00    Ca    9.0      06 Aug 2017 06:14  Phos  3.6     08-06  Mg     2.2     08-06    MEDICATIONS  (STANDING):  sodium chloride 0.45%. 1000 milliLiter(s) (10 mL/Hr) IV Continuous <Continuous>  heparin  Injectable 5000 Unit(s) SubCutaneous every 8 hours  tamsulosin 0.4 milliGRAM(s) Oral at bedtime  atorvastatin 40 milliGRAM(s) Oral at bedtime  insulin lispro (HumaLOG) corrective regimen sliding scale   SubCutaneous Before meals and at bedtime  dextrose 5%. 1000 milliLiter(s) (50 mL/Hr) IV Continuous <Continuous>  dextrose 50% Injectable 12.5 Gram(s) IV Push once  dextrose 50% Injectable 25 Gram(s) IV Push once  dextrose 50% Injectable 25 Gram(s) IV Push once  famotidine    Tablet 20 milliGRAM(s) Oral two times a day  aspirin enteric coated 81 milliGRAM(s) Oral daily  lidocaine   Patch 1 Patch Transdermal daily  docusate sodium 100 milliGRAM(s) Oral three times a day  senna 2 Tablet(s) Oral at bedtime  bicalutamide 50 milliGRAM(s) Oral daily  amiodarone    Tablet 200 milliGRAM(s) Oral daily  metoprolol 25 milliGRAM(s) Oral every 12 hours  fentaNYL    Injectable 12.5 MICROGram(s) IV Push once    MEDICATIONS  (PRN):  dextrose Gel 1 Dose(s) Oral once PRN Blood Glucose LESS THAN 70 milliGRAM(s)/deciliter  glucagon  Injectable 1 milliGRAM(s) IntraMuscular once PRN Glucose LESS THAN 70 milligrams/deciliter  acetaminophen   Tablet. 650 milliGRAM(s) Oral every 6 hours PRN Mild Pain (1 - 3)  traMADol 50 milliGRAM(s) Oral every 6 hours PRN Moderate Pain (4 - 6)  polyethylene glycol 3350 17 Gram(s) Oral daily PRN Constipation  oxyCODONE    5 mG/acetaminophen 325 mG 2 Tablet(s) Oral every 6 hours PRN Severe Pain (7 - 10)  magnesium hydroxide Suspension 30 milliLiter(s) Oral daily PRN Constipation        RADIOLOGY & ADDITIONAL TESTS:  < from: Xray Chest 1 View AP -PORTABLE-Routine (08.06.17 @ 07:27) >  INTERPRETATION:  Clinical History: Abnormal chest sounds    Portable examination demonstrates cardiomegaly. Patient status post   sternotomy. No interval change lung pathology in comparison to prior   examination of the chest 8/5/17. Left chest tube noted.    Impression: No interval change lung pathology    < end of copied text >  < from: Xray Chest 1 View AP -PORTABLE-Routine (08.05.17 @ 07:09) >  Impression: Congestion and/or infiltrates. Bilateral effusions    < end of copied text > Patient discussed on morning rounds with Dr. Corbin    Operation / Date:valve sparing aortic root and hemiarch replacement on 7/31/17    SUBJECTIVE ASSESSMENT:  71year old male in much better spirits today, says his pain is much better, but he is having problem catching his breath especially when he walks.      Vital Signs Last 24 Hrs  T(C): 36.7 (06 Aug 2017 14:00), Max: 37.2 (05 Aug 2017 21:22)  T(F): 98.1 (06 Aug 2017 14:00), Max: 98.9 (05 Aug 2017 21:22)  HR: 80 (06 Aug 2017 14:00) (62 - 87)  BP: 101/56 (06 Aug 2017 14:00) (101/56 - 167/77)  BP(mean): 103 (06 Aug 2017 13:00) (82 - 115)  RR: 16 (06 Aug 2017 14:00) (16 - 18)  SpO2: 98% (06 Aug 2017 14:00) (95% - 98%)  I&O's Detail    05 Aug 2017 07:01  -  06 Aug 2017 07:00  --------------------------------------------------------  IN:    Oral Fluid: 1345 mL  Total IN: 1345 mL    OUT:    Bulb: 45 mL    Voided: 1590 mL  Total OUT: 1635 mL    Total NET: -290 mL      06 Aug 2017 07:01  -  06 Aug 2017 15:19  --------------------------------------------------------  IN:    Oral Fluid: 775 mL  Total IN: 775 mL    OUT:    Bulb: 40 mL    Chest Tube: 600 mL    Chest Tube: 750 mL    Voided: 500 mL  Total OUT: 1890 mL    Total NET: -1115 mL    CHEST TUBE: No   JAM DRAIN:  Yes  45ml x24hrs  EPICARDIAL WIRES: Yes V-wires at 40bpm backup  TIE DOWNS:No.  PAREDES:No.    PHYSICAL EXAM:    General:patient seen sitting out of bed in NAD    Neurological:alert and oriented x 3, no gross deficit appreciated    Cardiovascular:RRR, S1S2, 2/6 holosystolic murmur LSB, +V Wires    Respiratory:decreased bibasilar breath sounds, no wheezing, no rhonci    Gastrointestinal:soft, nontender, non-distended, positive bowel sounds    Extremities:bilateral LE edema 1+, warm    Vascular:all pulses intact (radial/femoral/DP 2+)    Incision Sites:sternotomy incision C/D/I no fluctuance, no erythema, blakex1 in place    LABS:                        10.2   8.4   )-----------( 176      ( 06 Aug 2017 06:14 )             30.2     COUMADIN: No. REASON: .    PT/INR - ( 06 Aug 2017 06:14 )   PT: 12.9 sec;   INR: 1.16       PTT - ( 06 Aug 2017 06:14 )  PTT:26.5 sec    08-06    137  |  98  |  15  ----------------------------<  102<H>  4.3   |  25  |  1.00    Ca    9.0      06 Aug 2017 06:14  Phos  3.6     08-06  Mg     2.2     08-06    MEDICATIONS  (STANDING):  sodium chloride 0.45%. 1000 milliLiter(s) (10 mL/Hr) IV Continuous <Continuous>  heparin  Injectable 5000 Unit(s) SubCutaneous every 8 hours  tamsulosin 0.4 milliGRAM(s) Oral at bedtime  atorvastatin 40 milliGRAM(s) Oral at bedtime  insulin lispro (HumaLOG) corrective regimen sliding scale   SubCutaneous Before meals and at bedtime  dextrose 5%. 1000 milliLiter(s) (50 mL/Hr) IV Continuous <Continuous>  dextrose 50% Injectable 12.5 Gram(s) IV Push once  dextrose 50% Injectable 25 Gram(s) IV Push once  dextrose 50% Injectable 25 Gram(s) IV Push once  famotidine    Tablet 20 milliGRAM(s) Oral two times a day  aspirin enteric coated 81 milliGRAM(s) Oral daily  lidocaine   Patch 1 Patch Transdermal daily  docusate sodium 100 milliGRAM(s) Oral three times a day  senna 2 Tablet(s) Oral at bedtime  bicalutamide 50 milliGRAM(s) Oral daily  amiodarone    Tablet 200 milliGRAM(s) Oral daily  metoprolol 25 milliGRAM(s) Oral every 12 hours  fentaNYL    Injectable 12.5 MICROGram(s) IV Push once    MEDICATIONS  (PRN):  dextrose Gel 1 Dose(s) Oral once PRN Blood Glucose LESS THAN 70 milliGRAM(s)/deciliter  glucagon  Injectable 1 milliGRAM(s) IntraMuscular once PRN Glucose LESS THAN 70 milligrams/deciliter  acetaminophen   Tablet. 650 milliGRAM(s) Oral every 6 hours PRN Mild Pain (1 - 3)  traMADol 50 milliGRAM(s) Oral every 6 hours PRN Moderate Pain (4 - 6)  polyethylene glycol 3350 17 Gram(s) Oral daily PRN Constipation  oxyCODONE    5 mG/acetaminophen 325 mG 2 Tablet(s) Oral every 6 hours PRN Severe Pain (7 - 10)  magnesium hydroxide Suspension 30 milliLiter(s) Oral daily PRN Constipation        RADIOLOGY & ADDITIONAL TESTS:  < from: Xray Chest 1 View AP -PORTABLE-Routine (08.06.17 @ 07:27) >  INTERPRETATION:  Clinical History: Abnormal chest sounds    Portable examination demonstrates cardiomegaly. Patient status post   sternotomy. No interval change lung pathology in comparison to prior   examination of the chest 8/5/17. Left chest tube noted.    Impression: No interval change lung pathology    < end of copied text >  < from: Xray Chest 1 View AP -PORTABLE-Routine (08.05.17 @ 07:09) >  Impression: Congestion and/or infiltrates. Bilateral effusions    < end of copied text >

## 2017-08-06 NOTE — PROCEDURE NOTE - NSPROCDETAILS_GEN_ALL_CORE
ultrasound assessment of fluid (size)/Seldinger technique/sterile dressing applied/dressing applied/secured in place/percutaneous

## 2017-08-06 NOTE — CHART NOTE - NSCHARTNOTEFT_GEN_A_CORE
V wires cut at the skin after cleaning the area with chlorhexidine. No bleeding at the site.  Patient tolerated procedure well.

## 2017-08-07 LAB
ANION GAP SERPL CALC-SCNC: 13 MMOL/L — SIGNIFICANT CHANGE UP (ref 5–17)
BUN SERPL-MCNC: 12 MG/DL — SIGNIFICANT CHANGE UP (ref 7–23)
CALCIUM SERPL-MCNC: 8.4 MG/DL — SIGNIFICANT CHANGE UP (ref 8.4–10.5)
CHLORIDE SERPL-SCNC: 96 MMOL/L — SIGNIFICANT CHANGE UP (ref 96–108)
CO2 SERPL-SCNC: 22 MMOL/L — SIGNIFICANT CHANGE UP (ref 22–31)
CREAT SERPL-MCNC: 0.9 MG/DL — SIGNIFICANT CHANGE UP (ref 0.5–1.3)
GLUCOSE SERPL-MCNC: 113 MG/DL — HIGH (ref 70–99)
HCT VFR BLD CALC: 28.9 % — LOW (ref 39–50)
HGB BLD-MCNC: 9.8 G/DL — LOW (ref 13–17)
MAGNESIUM SERPL-MCNC: 2.2 MG/DL — SIGNIFICANT CHANGE UP (ref 1.6–2.6)
MCHC RBC-ENTMCNC: 31.3 PG — SIGNIFICANT CHANGE UP (ref 27–34)
MCHC RBC-ENTMCNC: 33.9 G/DL — SIGNIFICANT CHANGE UP (ref 32–36)
MCV RBC AUTO: 92.3 FL — SIGNIFICANT CHANGE UP (ref 80–100)
PHOSPHATE SERPL-MCNC: 3.6 MG/DL — SIGNIFICANT CHANGE UP (ref 2.5–4.5)
PLATELET # BLD AUTO: 201 K/UL — SIGNIFICANT CHANGE UP (ref 150–400)
POTASSIUM SERPL-MCNC: 4.2 MMOL/L — SIGNIFICANT CHANGE UP (ref 3.5–5.3)
POTASSIUM SERPL-SCNC: 4.2 MMOL/L — SIGNIFICANT CHANGE UP (ref 3.5–5.3)
RBC # BLD: 3.13 M/UL — LOW (ref 4.2–5.8)
RBC # FLD: 13.6 % — SIGNIFICANT CHANGE UP (ref 10.3–16.9)
SODIUM SERPL-SCNC: 131 MMOL/L — LOW (ref 135–145)
WBC # BLD: 9.5 K/UL — SIGNIFICANT CHANGE UP (ref 3.8–10.5)
WBC # FLD AUTO: 9.5 K/UL — SIGNIFICANT CHANGE UP (ref 3.8–10.5)

## 2017-08-07 PROCEDURE — 71010: CPT | Mod: 26

## 2017-08-07 RX ORDER — LACTULOSE 10 G/15ML
10 SOLUTION ORAL ONCE
Qty: 0 | Refills: 0 | Status: COMPLETED | OUTPATIENT
Start: 2017-08-07 | End: 2017-08-07

## 2017-08-07 RX ORDER — METOPROLOL TARTRATE 50 MG
25 TABLET ORAL EVERY 12 HOURS
Qty: 0 | Refills: 0 | Status: DISCONTINUED | OUTPATIENT
Start: 2017-08-07 | End: 2017-08-09

## 2017-08-07 RX ORDER — OXYCODONE AND ACETAMINOPHEN 5; 325 MG/1; MG/1
2 TABLET ORAL EVERY 4 HOURS
Qty: 0 | Refills: 0 | Status: DISCONTINUED | OUTPATIENT
Start: 2017-08-07 | End: 2017-08-10

## 2017-08-07 RX ORDER — FUROSEMIDE 40 MG
40 TABLET ORAL DAILY
Qty: 0 | Refills: 0 | Status: DISCONTINUED | OUTPATIENT
Start: 2017-08-07 | End: 2017-08-10

## 2017-08-07 RX ADMIN — Medication 81 MILLIGRAM(S): at 11:18

## 2017-08-07 RX ADMIN — TRAMADOL HYDROCHLORIDE 50 MILLIGRAM(S): 50 TABLET ORAL at 18:05

## 2017-08-07 RX ADMIN — HEPARIN SODIUM 5000 UNIT(S): 5000 INJECTION INTRAVENOUS; SUBCUTANEOUS at 06:36

## 2017-08-07 RX ADMIN — FAMOTIDINE 20 MILLIGRAM(S): 10 INJECTION INTRAVENOUS at 17:48

## 2017-08-07 RX ADMIN — LIDOCAINE 1 PATCH: 4 CREAM TOPICAL at 02:01

## 2017-08-07 RX ADMIN — TRAMADOL HYDROCHLORIDE 50 MILLIGRAM(S): 50 TABLET ORAL at 23:20

## 2017-08-07 RX ADMIN — TAMSULOSIN HYDROCHLORIDE 0.4 MILLIGRAM(S): 0.4 CAPSULE ORAL at 21:11

## 2017-08-07 RX ADMIN — OXYCODONE AND ACETAMINOPHEN 2 TABLET(S): 5; 325 TABLET ORAL at 15:30

## 2017-08-07 RX ADMIN — OXYCODONE AND ACETAMINOPHEN 2 TABLET(S): 5; 325 TABLET ORAL at 11:30

## 2017-08-07 RX ADMIN — OXYCODONE AND ACETAMINOPHEN 2 TABLET(S): 5; 325 TABLET ORAL at 05:30

## 2017-08-07 RX ADMIN — LACTULOSE 10 GRAM(S): 10 SOLUTION ORAL at 19:46

## 2017-08-07 RX ADMIN — Medication 25 MILLIGRAM(S): at 17:49

## 2017-08-07 RX ADMIN — LIDOCAINE 1 PATCH: 4 CREAM TOPICAL at 11:18

## 2017-08-07 RX ADMIN — Medication 100 MILLIGRAM(S): at 14:13

## 2017-08-07 RX ADMIN — HEPARIN SODIUM 5000 UNIT(S): 5000 INJECTION INTRAVENOUS; SUBCUTANEOUS at 21:25

## 2017-08-07 RX ADMIN — HEPARIN SODIUM 5000 UNIT(S): 5000 INJECTION INTRAVENOUS; SUBCUTANEOUS at 14:14

## 2017-08-07 RX ADMIN — Medication 25 MILLIGRAM(S): at 06:35

## 2017-08-07 RX ADMIN — OXYCODONE AND ACETAMINOPHEN 2 TABLET(S): 5; 325 TABLET ORAL at 04:53

## 2017-08-07 RX ADMIN — TRAMADOL HYDROCHLORIDE 50 MILLIGRAM(S): 50 TABLET ORAL at 09:10

## 2017-08-07 RX ADMIN — ATORVASTATIN CALCIUM 40 MILLIGRAM(S): 80 TABLET, FILM COATED ORAL at 21:11

## 2017-08-07 RX ADMIN — OXYCODONE AND ACETAMINOPHEN 2 TABLET(S): 5; 325 TABLET ORAL at 11:17

## 2017-08-07 RX ADMIN — LIDOCAINE 1 PATCH: 4 CREAM TOPICAL at 11:22

## 2017-08-07 RX ADMIN — OXYCODONE AND ACETAMINOPHEN 2 TABLET(S): 5; 325 TABLET ORAL at 19:45

## 2017-08-07 RX ADMIN — TRAMADOL HYDROCHLORIDE 50 MILLIGRAM(S): 50 TABLET ORAL at 01:39

## 2017-08-07 RX ADMIN — FAMOTIDINE 20 MILLIGRAM(S): 10 INJECTION INTRAVENOUS at 06:35

## 2017-08-07 RX ADMIN — TRAMADOL HYDROCHLORIDE 50 MILLIGRAM(S): 50 TABLET ORAL at 17:47

## 2017-08-07 RX ADMIN — Medication 650 MILLIGRAM(S): at 21:11

## 2017-08-07 RX ADMIN — Medication 100 MILLIGRAM(S): at 06:35

## 2017-08-07 RX ADMIN — LIDOCAINE 1 PATCH: 4 CREAM TOPICAL at 23:35

## 2017-08-07 RX ADMIN — OXYCODONE AND ACETAMINOPHEN 2 TABLET(S): 5; 325 TABLET ORAL at 15:15

## 2017-08-07 RX ADMIN — Medication 100 MILLIGRAM(S): at 21:25

## 2017-08-07 RX ADMIN — SENNA PLUS 2 TABLET(S): 8.6 TABLET ORAL at 21:11

## 2017-08-07 RX ADMIN — Medication 40 MILLIGRAM(S): at 09:52

## 2017-08-07 RX ADMIN — TRAMADOL HYDROCHLORIDE 50 MILLIGRAM(S): 50 TABLET ORAL at 09:25

## 2017-08-07 RX ADMIN — Medication 650 MILLIGRAM(S): at 22:00

## 2017-08-07 RX ADMIN — OXYCODONE AND ACETAMINOPHEN 2 TABLET(S): 5; 325 TABLET ORAL at 20:30

## 2017-08-07 RX ADMIN — TRAMADOL HYDROCHLORIDE 50 MILLIGRAM(S): 50 TABLET ORAL at 02:29

## 2017-08-07 RX ADMIN — AMIODARONE HYDROCHLORIDE 200 MILLIGRAM(S): 400 TABLET ORAL at 06:35

## 2017-08-07 RX ADMIN — BICALUTAMIDE 50 MILLIGRAM(S): 50 TABLET, FILM COATED ORAL at 11:18

## 2017-08-07 NOTE — PROGRESS NOTE ADULT - SUBJECTIVE AND OBJECTIVE BOX
Patient discussed on morning rounds with       Operation / Date:     SUBJECTIVE ASSESSMENT:  71y Male         Vital Signs Last 24 Hrs  T(C): 36.4 (07 Aug 2017 09:12), Max: 36.8 (06 Aug 2017 21:32)  T(F): 97.5 (07 Aug 2017 09:12), Max: 98.2 (06 Aug 2017 21:32)  HR: 77 (07 Aug 2017 09:12) (70 - 98)  BP: 108/56 (07 Aug 2017 12:39) (100/56 - 166/85)  BP(mean): 78 (07 Aug 2017 12:39) (69 - 119)  RR: 20 (07 Aug 2017 12:39) (18 - 22)  SpO2: 94% (07 Aug 2017 12:39) (94% - 99%)  I&O's Detail    06 Aug 2017 07:01  -  07 Aug 2017 07:00  --------------------------------------------------------  IN:    Oral Fluid: 1175 mL  Total IN: 1175 mL    OUT:    Bulb: 40 mL    Chest Tube: 870 mL    Chest Tube: 1285 mL    Voided: 1100 mL  Total OUT: 3295 mL    Total NET: -2120 mL      07 Aug 2017 07:01  -  07 Aug 2017 14:13  --------------------------------------------------------  IN:    Oral Fluid: 350 mL  Total IN: 350 mL    OUT:    Chest Tube: 130 mL    Chest Tube: 75 mL  Total OUT: 205 mL    Total NET: 145 mL          CHEST TUBE:  Yes/No. AIR LEAKS: Yes/No. Suction / H2O SEAL.   JAM DRAIN:  Yes/No.  EPICARDIAL WIRES: Yes/No.  TIE DOWNS: Yes/No.  PAREDES: Yes/No.    PHYSICAL EXAM:    General:     Neurological:    Cardiovascular:    Respiratory:    Gastrointestinal:    Extremities:    Vascular:    Incision Sites:    LABS:                        9.8    9.5   )-----------( 201      ( 07 Aug 2017 06:11 )             28.9       COUMADIN:  Yes/No. REASON: .    PT/INR - ( 06 Aug 2017 06:14 )   PT: 12.9 sec;   INR: 1.16          PTT - ( 06 Aug 2017 06:14 )  PTT:26.5 sec    08-07    131<L>  |  96  |  12  ----------------------------<  113<H>  4.2   |  22  |  0.90    Ca    8.4      07 Aug 2017 06:11  Phos  3.6     08-07  Mg     2.2     08-07            MEDICATIONS  (STANDING):  sodium chloride 0.45%. 1000 milliLiter(s) (10 mL/Hr) IV Continuous <Continuous>  heparin  Injectable 5000 Unit(s) SubCutaneous every 8 hours  tamsulosin 0.4 milliGRAM(s) Oral at bedtime  atorvastatin 40 milliGRAM(s) Oral at bedtime  dextrose 5%. 1000 milliLiter(s) (50 mL/Hr) IV Continuous <Continuous>  dextrose 50% Injectable 12.5 Gram(s) IV Push once  dextrose 50% Injectable 25 Gram(s) IV Push once  dextrose 50% Injectable 25 Gram(s) IV Push once  famotidine    Tablet 20 milliGRAM(s) Oral two times a day  aspirin enteric coated 81 milliGRAM(s) Oral daily  lidocaine   Patch 1 Patch Transdermal daily  docusate sodium 100 milliGRAM(s) Oral three times a day  senna 2 Tablet(s) Oral at bedtime  bicalutamide 50 milliGRAM(s) Oral daily  amiodarone    Tablet 200 milliGRAM(s) Oral daily  lidocaine   Patch 1 Patch Transdermal daily  metoclopramide Injectable 10 milliGRAM(s) IV Push once  fentaNYL    Injectable 12.5 MICROGram(s) IV Push once  metoprolol succinate ER 25 milliGRAM(s) Oral every 12 hours  furosemide    Tablet 40 milliGRAM(s) Oral daily    MEDICATIONS  (PRN):  dextrose Gel 1 Dose(s) Oral once PRN Blood Glucose LESS THAN 70 milliGRAM(s)/deciliter  glucagon  Injectable 1 milliGRAM(s) IntraMuscular once PRN Glucose LESS THAN 70 milligrams/deciliter  acetaminophen   Tablet. 650 milliGRAM(s) Oral every 6 hours PRN Mild Pain (1 - 3)  traMADol 50 milliGRAM(s) Oral every 6 hours PRN Moderate Pain (4 - 6)  polyethylene glycol 3350 17 Gram(s) Oral daily PRN Constipation  oxyCODONE    5 mG/acetaminophen 325 mG 2 Tablet(s) Oral every 6 hours PRN Severe Pain (7 - 10)  magnesium hydroxide Suspension 30 milliLiter(s) Oral daily PRN Constipation        RADIOLOGY & ADDITIONAL TESTS: Patient discussed on morning rounds with Dr. Fletcher     Operation / Date:   7/31/17: valve sparing aortic root and hemiarch replacement EF 45%  post-op paroxysml AF    SUBJECTIVE ASSESSMENT:  71y Male still reports that his pain is uncontrolled. States that the pain medication works for a few hours but wears off before the next dose. States that the pain is localized to the back on both sides which is worse with deep inspiration. States that he is walking frequently and that he does not feel short of breath with ambulation. States that he feels a different in the pressure in his chest since the fluid has been drained but reports that the pain is still making it hard to take deep breathing. No other complaints at this time.         Vital Signs Last 24 Hrs  T(C): 36.4 (07 Aug 2017 09:12), Max: 36.8 (06 Aug 2017 21:32)  T(F): 97.5 (07 Aug 2017 09:12), Max: 98.2 (06 Aug 2017 21:32)  HR: 77 (07 Aug 2017 09:12) (70 - 98)  BP: 108/56 (07 Aug 2017 12:39) (100/56 - 166/85)  BP(mean): 78 (07 Aug 2017 12:39) (69 - 119)  RR: 20 (07 Aug 2017 12:39) (18 - 22)  SpO2: 94% (07 Aug 2017 12:39) (94% - 99%)  I&O's Detail    06 Aug 2017 07:01  -  07 Aug 2017 07:00  --------------------------------------------------------  IN:    Oral Fluid: 1175 mL  Total IN: 1175 mL    OUT:    Bulb: 40 mL    Chest Tube: 870 mL    Chest Tube: 1285 mL    Voided: 1100 mL  Total OUT: 3295 mL    Total NET: -2120 mL      07 Aug 2017 07:01  -  07 Aug 2017 14:13  --------------------------------------------------------  IN:    Oral Fluid: 350 mL  Total IN: 350 mL    OUT:    Chest Tube: 130 mL    Chest Tube: 75 mL  Total OUT: 205 mL    Total NET: 145 mL          CHEST TUBE:  Yes x2 no airleak on suction   JAM DRAIN:  No.  EPICARDIAL WIRES: No.  TIE DOWNS: Yes  PAREDES: /No.    PHYSICAL EXAM:    General: AOx in no acute distress.     Neurological: no focal neuro deficit.     Cardiovascular: RRR  no murmurs rubs or gallops.     Respiratory: poor inspiratory effort bibasilar rhonchi. no wheeze or rhonchi     Gastrointestinal: soft mildly distended. non tender to palpitation     Extremities: WWP 1+ lower extremity edema. bilaterally.     Vascular: palpable DP and radial pulses bilaterally     Incision Sites: MSI clean dry intact no sternal click or crepitus     LABS:                        9.8    9.5   )-----------( 201      ( 07 Aug 2017 06:11 )             28.9       COUMADIN:  No.    PT/INR - ( 06 Aug 2017 06:14 )   PT: 12.9 sec;   INR: 1.16          PTT - ( 06 Aug 2017 06:14 )  PTT:26.5 sec    08-07    131<L>  |  96  |  12  ----------------------------<  113<H>  4.2   |  22  |  0.90    Ca    8.4      07 Aug 2017 06:11  Phos  3.6     08-07  Mg     2.2     08-07            MEDICATIONS  (STANDING):  sodium chloride 0.45%. 1000 milliLiter(s) (10 mL/Hr) IV Continuous <Continuous>  heparin  Injectable 5000 Unit(s) SubCutaneous every 8 hours  tamsulosin 0.4 milliGRAM(s) Oral at bedtime  atorvastatin 40 milliGRAM(s) Oral at bedtime  dextrose 5%. 1000 milliLiter(s) (50 mL/Hr) IV Continuous <Continuous>  dextrose 50% Injectable 12.5 Gram(s) IV Push once  dextrose 50% Injectable 25 Gram(s) IV Push once  dextrose 50% Injectable 25 Gram(s) IV Push once  famotidine    Tablet 20 milliGRAM(s) Oral two times a day  aspirin enteric coated 81 milliGRAM(s) Oral daily  lidocaine   Patch 1 Patch Transdermal daily  docusate sodium 100 milliGRAM(s) Oral three times a day  senna 2 Tablet(s) Oral at bedtime  bicalutamide 50 milliGRAM(s) Oral daily  amiodarone    Tablet 200 milliGRAM(s) Oral daily  lidocaine   Patch 1 Patch Transdermal daily  metoclopramide Injectable 10 milliGRAM(s) IV Push once  fentaNYL    Injectable 12.5 MICROGram(s) IV Push once  metoprolol succinate ER 25 milliGRAM(s) Oral every 12 hours  furosemide    Tablet 40 milliGRAM(s) Oral daily    MEDICATIONS  (PRN):  dextrose Gel 1 Dose(s) Oral once PRN Blood Glucose LESS THAN 70 milliGRAM(s)/deciliter  glucagon  Injectable 1 milliGRAM(s) IntraMuscular once PRN Glucose LESS THAN 70 milligrams/deciliter  acetaminophen   Tablet. 650 milliGRAM(s) Oral every 6 hours PRN Mild Pain (1 - 3)  traMADol 50 milliGRAM(s) Oral every 6 hours PRN Moderate Pain (4 - 6)  polyethylene glycol 3350 17 Gram(s) Oral daily PRN Constipation  oxyCODONE    5 mG/acetaminophen 325 mG 2 Tablet(s) Oral every 6 hours PRN Severe Pain (7 - 10)  magnesium hydroxide Suspension 30 milliLiter(s) Oral daily PRN Constipation        RADIOLOGY & ADDITIONAL TESTS:

## 2017-08-07 NOTE — PROGRESS NOTE ADULT - ASSESSMENT
71 year old male, former smoker, with pmHx of HTN, Systolic CHF (EF 40%), MI @ [place] in 2016 (tx medically), questionable Afib (on aspirin only per patient), recurrent prostate cancer with metastasis to the vertebrae and lungs (dx 2011) s/p ADT and EBRT was recently advised to have another ADT/monotherapy with high dose androgen blocker for his prostate cancer, but refused the treatment now POD#5 s/p valve sparing aortic root and hemiarch replacement on 7/31/17, afib post op started on amiodarone now NSR and tolerating beta blocker. Bilateral pigtails placed yesterday for pleural effusions. Both still draining signifcant amount of fluid.     Plan:    Neurovascular: pain uncontrolled today. Increased frequency of percocet.   Pain management on lidocaine patch, tylenol, tramadol and percocet prn.     Cardiovascular: Hemodynamically stable. Had Post op Afib. NSR now. Patient had frequent PACs this AM but no afib.   -ASA, atorvastatin     HR controlled on amiodarone 200mg daily, metoprolol increased to 25mg q12hr after burst of afib overnight  -Cut V wires     Respiratory:   02 Sat = 98% on RA.  -Encourage C+DB and Use of IS 10x / hr while awake.  -ambulate  -remove estela  -US chest for possible pigtail placement to drain effusion bilateral  GI: Stable.  -PPX.  -PO Diet  -Bowel regimen on colace/senna    Renal / :  -Monitor renal function.  -Monitor I/O's.  -continue bicalutamide 50mg daily, flomax hs    Endocrine:    -fsg well controlled on insulin sliding scale    Hematologic:  -CBC.  -Coagulation Panel.    ID:  -Temperature.  -CBC.    Prophylaxis:  -DVT prophylaxis with 5000 SubQ Heparin q8h.  -SCD's    Disposition:  -Discharge Monday (need VNS with longer hours)

## 2017-08-08 LAB
ANION GAP SERPL CALC-SCNC: 12 MMOL/L — SIGNIFICANT CHANGE UP (ref 5–17)
APTT BLD: 27.2 SEC — LOW (ref 27.5–37.4)
BASOPHILS NFR BLD AUTO: 0.3 % — SIGNIFICANT CHANGE UP (ref 0–2)
BUN SERPL-MCNC: 14 MG/DL — SIGNIFICANT CHANGE UP (ref 7–23)
CALCIUM SERPL-MCNC: 8.5 MG/DL — SIGNIFICANT CHANGE UP (ref 8.4–10.5)
CHLORIDE SERPL-SCNC: 95 MMOL/L — LOW (ref 96–108)
CO2 SERPL-SCNC: 25 MMOL/L — SIGNIFICANT CHANGE UP (ref 22–31)
CREAT SERPL-MCNC: 1.1 MG/DL — SIGNIFICANT CHANGE UP (ref 0.5–1.3)
EOSINOPHIL NFR BLD AUTO: 0.9 % — SIGNIFICANT CHANGE UP (ref 0–6)
GLUCOSE SERPL-MCNC: 107 MG/DL — HIGH (ref 70–99)
HCT VFR BLD CALC: 28.3 % — LOW (ref 39–50)
HGB BLD-MCNC: 9.5 G/DL — LOW (ref 13–17)
INR BLD: 1.18 — HIGH (ref 0.88–1.16)
LYMPHOCYTES # BLD AUTO: 9.3 % — LOW (ref 13–44)
MAGNESIUM SERPL-MCNC: 2.3 MG/DL — SIGNIFICANT CHANGE UP (ref 1.6–2.6)
MCHC RBC-ENTMCNC: 31.6 PG — SIGNIFICANT CHANGE UP (ref 27–34)
MCHC RBC-ENTMCNC: 33.6 G/DL — SIGNIFICANT CHANGE UP (ref 32–36)
MCV RBC AUTO: 94 FL — SIGNIFICANT CHANGE UP (ref 80–100)
MONOCYTES NFR BLD AUTO: 8.7 % — SIGNIFICANT CHANGE UP (ref 2–14)
NEUTROPHILS NFR BLD AUTO: 80.8 % — HIGH (ref 43–77)
PHOSPHATE SERPL-MCNC: 3.8 MG/DL — SIGNIFICANT CHANGE UP (ref 2.5–4.5)
PLATELET # BLD AUTO: 238 K/UL — SIGNIFICANT CHANGE UP (ref 150–400)
POTASSIUM SERPL-MCNC: 4.6 MMOL/L — SIGNIFICANT CHANGE UP (ref 3.5–5.3)
POTASSIUM SERPL-SCNC: 4.6 MMOL/L — SIGNIFICANT CHANGE UP (ref 3.5–5.3)
PROTHROM AB SERPL-ACNC: 13.1 SEC — HIGH (ref 9.8–12.7)
RBC # BLD: 3.01 M/UL — LOW (ref 4.2–5.8)
RBC # FLD: 13.1 % — SIGNIFICANT CHANGE UP (ref 10.3–16.9)
SODIUM SERPL-SCNC: 132 MMOL/L — LOW (ref 135–145)
WBC # BLD: 10.7 K/UL — HIGH (ref 3.8–10.5)
WBC # FLD AUTO: 10.7 K/UL — HIGH (ref 3.8–10.5)

## 2017-08-08 PROCEDURE — 71010: CPT | Mod: 26

## 2017-08-08 PROCEDURE — 93306 TTE W/DOPPLER COMPLETE: CPT | Mod: 26

## 2017-08-08 RX ORDER — OXYCODONE AND ACETAMINOPHEN 5; 325 MG/1; MG/1
1 TABLET ORAL ONCE
Qty: 0 | Refills: 0 | Status: DISCONTINUED | OUTPATIENT
Start: 2017-08-08 | End: 2017-08-10

## 2017-08-08 RX ADMIN — HEPARIN SODIUM 5000 UNIT(S): 5000 INJECTION INTRAVENOUS; SUBCUTANEOUS at 21:51

## 2017-08-08 RX ADMIN — HEPARIN SODIUM 5000 UNIT(S): 5000 INJECTION INTRAVENOUS; SUBCUTANEOUS at 05:41

## 2017-08-08 RX ADMIN — TRAMADOL HYDROCHLORIDE 50 MILLIGRAM(S): 50 TABLET ORAL at 05:15

## 2017-08-08 RX ADMIN — BICALUTAMIDE 50 MILLIGRAM(S): 50 TABLET, FILM COATED ORAL at 11:06

## 2017-08-08 RX ADMIN — OXYCODONE AND ACETAMINOPHEN 2 TABLET(S): 5; 325 TABLET ORAL at 11:04

## 2017-08-08 RX ADMIN — HEPARIN SODIUM 5000 UNIT(S): 5000 INJECTION INTRAVENOUS; SUBCUTANEOUS at 14:44

## 2017-08-08 RX ADMIN — SENNA PLUS 2 TABLET(S): 8.6 TABLET ORAL at 21:52

## 2017-08-08 RX ADMIN — OXYCODONE AND ACETAMINOPHEN 2 TABLET(S): 5; 325 TABLET ORAL at 17:09

## 2017-08-08 RX ADMIN — TAMSULOSIN HYDROCHLORIDE 0.4 MILLIGRAM(S): 0.4 CAPSULE ORAL at 21:52

## 2017-08-08 RX ADMIN — OXYCODONE AND ACETAMINOPHEN 2 TABLET(S): 5; 325 TABLET ORAL at 18:55

## 2017-08-08 RX ADMIN — Medication 81 MILLIGRAM(S): at 11:05

## 2017-08-08 RX ADMIN — LIDOCAINE 1 PATCH: 4 CREAM TOPICAL at 22:00

## 2017-08-08 RX ADMIN — Medication 100 MILLIGRAM(S): at 21:51

## 2017-08-08 RX ADMIN — ATORVASTATIN CALCIUM 40 MILLIGRAM(S): 80 TABLET, FILM COATED ORAL at 21:52

## 2017-08-08 RX ADMIN — Medication 40 MILLIGRAM(S): at 05:41

## 2017-08-08 RX ADMIN — OXYCODONE AND ACETAMINOPHEN 2 TABLET(S): 5; 325 TABLET ORAL at 02:15

## 2017-08-08 RX ADMIN — LIDOCAINE 1 PATCH: 4 CREAM TOPICAL at 11:05

## 2017-08-08 RX ADMIN — Medication 25 MILLIGRAM(S): at 05:41

## 2017-08-08 RX ADMIN — OXYCODONE AND ACETAMINOPHEN 2 TABLET(S): 5; 325 TABLET ORAL at 07:00

## 2017-08-08 RX ADMIN — FAMOTIDINE 20 MILLIGRAM(S): 10 INJECTION INTRAVENOUS at 17:41

## 2017-08-08 RX ADMIN — Medication 100 MILLIGRAM(S): at 14:45

## 2017-08-08 RX ADMIN — MAGNESIUM HYDROXIDE 30 MILLILITER(S): 400 TABLET, CHEWABLE ORAL at 17:09

## 2017-08-08 RX ADMIN — Medication 100 MILLIGRAM(S): at 05:41

## 2017-08-08 RX ADMIN — OXYCODONE AND ACETAMINOPHEN 2 TABLET(S): 5; 325 TABLET ORAL at 01:44

## 2017-08-08 RX ADMIN — FAMOTIDINE 20 MILLIGRAM(S): 10 INJECTION INTRAVENOUS at 05:41

## 2017-08-08 RX ADMIN — AMIODARONE HYDROCHLORIDE 200 MILLIGRAM(S): 400 TABLET ORAL at 05:41

## 2017-08-08 RX ADMIN — TRAMADOL HYDROCHLORIDE 50 MILLIGRAM(S): 50 TABLET ORAL at 00:00

## 2017-08-08 RX ADMIN — OXYCODONE AND ACETAMINOPHEN 2 TABLET(S): 5; 325 TABLET ORAL at 06:15

## 2017-08-08 RX ADMIN — Medication 25 MILLIGRAM(S): at 17:47

## 2017-08-08 NOTE — CHART NOTE - NSCHARTNOTEFT_GEN_A_CORE
Admitting Diagnosis:   Patient is a 71y old  Male who presents with a chief complaint of Aortic Aneurysm (04 Aug 2017 08:11)      PAST MEDICAL & SURGICAL HISTORY:  Shortness of breath  Bronchitis  Paroxysmal atrial fibrillation  Congestive heart failure, unspecified congestive heart failure chronicity, unspecified congestive heart failure type  Metastasis  Prostate cancer  Benign prostatic hyperplasia, presence of lower urinary tract symptoms unspecified, unspecified morphology  Collar bone fracture      Current Nutrition Order:  CSTCHO No Snack, Low Na       PO Intake: Good (%) [ x ]  Fair (50-75%) [   ] Poor (<25%) [   ]    GI Issues: Has not had BM since  and feels nauseas when ambulating    Pain: Pain being managed    Skin Integrity: Surgical incision    Labs:       132<L>  |  95<L>  |  14  ----------------------------<  107<H>  4.6   |  25  |  1.10    Ca    8.5      08 Aug 2017 05:52  Phos  3.8       Mg     2.3     -    Na 132, Cl 95, Glu 107    Medications:  MEDICATIONS  (STANDING):  sodium chloride 0.45%. 1000 milliLiter(s) (10 mL/Hr) IV Continuous <Continuous>  heparin  Injectable 5000 Unit(s) SubCutaneous every 8 hours  tamsulosin 0.4 milliGRAM(s) Oral at bedtime  atorvastatin 40 milliGRAM(s) Oral at bedtime  dextrose 5%. 1000 milliLiter(s) (50 mL/Hr) IV Continuous <Continuous>  dextrose 50% Injectable 12.5 Gram(s) IV Push once  dextrose 50% Injectable 25 Gram(s) IV Push once  dextrose 50% Injectable 25 Gram(s) IV Push once  famotidine    Tablet 20 milliGRAM(s) Oral two times a day  aspirin enteric coated 81 milliGRAM(s) Oral daily  lidocaine   Patch 1 Patch Transdermal daily  docusate sodium 100 milliGRAM(s) Oral three times a day  senna 2 Tablet(s) Oral at bedtime  bicalutamide 50 milliGRAM(s) Oral daily  amiodarone    Tablet 200 milliGRAM(s) Oral daily  lidocaine   Patch 1 Patch Transdermal daily  metoclopramide Injectable 10 milliGRAM(s) IV Push once  fentaNYL    Injectable 12.5 MICROGram(s) IV Push once  metoprolol succinate ER 25 milliGRAM(s) Oral every 12 hours  furosemide    Tablet 40 milliGRAM(s) Oral daily  oxyCODONE    5 mG/acetaminophen 325 mG 1 Tablet(s) Oral once    MEDICATIONS  (PRN):  dextrose Gel 1 Dose(s) Oral once PRN Blood Glucose LESS THAN 70 milliGRAM(s)/deciliter  glucagon  Injectable 1 milliGRAM(s) IntraMuscular once PRN Glucose LESS THAN 70 milligrams/deciliter  acetaminophen   Tablet. 650 milliGRAM(s) Oral every 6 hours PRN Mild Pain (1 - 3)  traMADol 50 milliGRAM(s) Oral every 6 hours PRN Moderate Pain (4 - 6)  polyethylene glycol 3350 17 Gram(s) Oral daily PRN Constipation  magnesium hydroxide Suspension 30 milliLiter(s) Oral daily PRN Constipation  oxyCODONE    5 mG/acetaminophen 325 mG 2 Tablet(s) Oral every 4 hours PRN Severe Pain (7 - 10)      Weight:  Daily Weight in k.6 (08 Aug 2017 01:00)  Weight at initial assessment: 85.7kg ()    Estimated energy needs:   Utilized ABW to calculate needs, pt falls within % of IBW. Adjusted for age/catabolic illness and post-op.   Fluids per MD 2/2 CHF.  2142-2571kcal/day (25-30kcal/kg)  102-120g pro/day (1.2-1.4g pro/kg)    Subjective:   70yo M, w/ recurrent prostate cancer with metastasis to the vertebrae and lungs (dx ) s/p ADT and EBRT. POD#5 s/p valve sparing aortic root and hemiarch replacement on 17. Currently on CSTCHO No Snack, low Na diet and tolerationg PO. States appetite has improved. Previously consuming 1/3 tray, now consuming 100%. Has not had BM since  and feels nauseas when ambulating. No issues chewing or swallowing however visible missing teeth. States following a vegetarian diet for last few months. Gets most protein from beans/legumes. No significant wt changes. NKFA or dietary restrictions -however states he was told to avoid dairy products 2/2 cancer. Skin intact except surgical incision and GI WDL per flowsheet.     Previous Nutrition Diagnosis:  Increased nutrient needs RT catabolic illness and post-op AEB increased demand for kcal/pro    Active [ x ]  Resolved [   ]    Goal: Pt to consistently meet at least 75% of estimated needs w/ good tolerance and 100% protein needs.     Recommendations:  1) Encourage intake at meals  2) Honor pts food preferences  3) Recommend daily MVI  4) Continue as daily wt    Education: Pt is understanding of increased nutrient needs. Reviewed adequate protein sources if vegetarian diet is preferred.    Risk Level: High [   ] Moderate [ x ] Low [   ]

## 2017-08-08 NOTE — PROGRESS NOTE ADULT - SUBJECTIVE AND OBJECTIVE BOX
Patient discussed on morning rounds with Dr. Fletcher     Operation / Date: Valve sparing aortic root and hemiarch replacement    SUBJECTIVE ASSESSMENT:  71y Male reports that his pain is much better controlled today. States that the increase in frequency of the pain meds has made a significant difference. He reports having in easier time sleeping last night as well. He has ambulated multiple times last night and this morning. States that his breathing is improved and he no longer feels breathless after ambulation.         Vital Signs Last 24 Hrs  T(C): 35.8 (08 Aug 2017 10:00), Max: 36.6 (07 Aug 2017 21:58)  T(F): 96.5 (08 Aug 2017 10:00), Max: 97.8 (07 Aug 2017 21:58)  HR: 69 (08 Aug 2017 10:00) (60 - 82)  BP: 128/60 (08 Aug 2017 10:00) (105/63 - 133/69)  BP(mean): 87 (08 Aug 2017 10:00) (78 - 91)  RR: 16 (08 Aug 2017 10:00) (16 - 20)  SpO2: 98% (08 Aug 2017 10:00) (94% - 98%)  I&O's Detail    07 Aug 2017 07:01  -  08 Aug 2017 07:00  --------------------------------------------------------  IN:    Oral Fluid: 1000 mL  Total IN: 1000 mL    OUT:    Chest Tube: 405 mL    Chest Tube: 220 mL    Voided: 425 mL  Total OUT: 1050 mL    Total NET: -50 mL      08 Aug 2017 07:01  -  08 Aug 2017 12:32  --------------------------------------------------------  IN:    Oral Fluid: 175 mL  Total IN: 175 mL    OUT:    Chest Tube: 190 mL    Chest Tube: 130 mL    Voided: 400 mL  Total OUT: 720 mL    Total NET: -545 mL          CHEST TUBE:  Yes/No. AIR LEAKS: Yes/No. Suction / H2O SEAL.   JAM DRAIN:  Yes/No.  EPICARDIAL WIRES: Yes/No.  TIE DOWNS: Yes/No.  PAREDES: Yes/No.    PHYSICAL EXAM:    General:     Neurological:    Cardiovascular:    Respiratory:    Gastrointestinal:    Extremities:    Vascular:    Incision Sites:    LABS:                        9.5    10.7  )-----------( 238      ( 08 Aug 2017 05:52 )             28.3       COUMADIN:  Yes/No. REASON: .    PT/INR - ( 08 Aug 2017 05:52 )   PT: 13.1 sec;   INR: 1.18          PTT - ( 08 Aug 2017 05:52 )  PTT:27.2 sec    08-08    132<L>  |  95<L>  |  14  ----------------------------<  107<H>  4.6   |  25  |  1.10    Ca    8.5      08 Aug 2017 05:52  Phos  3.8     08-08  Mg     2.3     08-08            MEDICATIONS  (STANDING):  sodium chloride 0.45%. 1000 milliLiter(s) (10 mL/Hr) IV Continuous <Continuous>  heparin  Injectable 5000 Unit(s) SubCutaneous every 8 hours  tamsulosin 0.4 milliGRAM(s) Oral at bedtime  atorvastatin 40 milliGRAM(s) Oral at bedtime  dextrose 5%. 1000 milliLiter(s) (50 mL/Hr) IV Continuous <Continuous>  dextrose 50% Injectable 12.5 Gram(s) IV Push once  dextrose 50% Injectable 25 Gram(s) IV Push once  dextrose 50% Injectable 25 Gram(s) IV Push once  famotidine    Tablet 20 milliGRAM(s) Oral two times a day  aspirin enteric coated 81 milliGRAM(s) Oral daily  lidocaine   Patch 1 Patch Transdermal daily  docusate sodium 100 milliGRAM(s) Oral three times a day  senna 2 Tablet(s) Oral at bedtime  bicalutamide 50 milliGRAM(s) Oral daily  amiodarone    Tablet 200 milliGRAM(s) Oral daily  lidocaine   Patch 1 Patch Transdermal daily  metoclopramide Injectable 10 milliGRAM(s) IV Push once  fentaNYL    Injectable 12.5 MICROGram(s) IV Push once  metoprolol succinate ER 25 milliGRAM(s) Oral every 12 hours  furosemide    Tablet 40 milliGRAM(s) Oral daily  oxyCODONE    5 mG/acetaminophen 325 mG 1 Tablet(s) Oral once    MEDICATIONS  (PRN):  dextrose Gel 1 Dose(s) Oral once PRN Blood Glucose LESS THAN 70 milliGRAM(s)/deciliter  glucagon  Injectable 1 milliGRAM(s) IntraMuscular once PRN Glucose LESS THAN 70 milligrams/deciliter  acetaminophen   Tablet. 650 milliGRAM(s) Oral every 6 hours PRN Mild Pain (1 - 3)  traMADol 50 milliGRAM(s) Oral every 6 hours PRN Moderate Pain (4 - 6)  polyethylene glycol 3350 17 Gram(s) Oral daily PRN Constipation  magnesium hydroxide Suspension 30 milliLiter(s) Oral daily PRN Constipation  oxyCODONE    5 mG/acetaminophen 325 mG 2 Tablet(s) Oral every 4 hours PRN Severe Pain (7 - 10)        RADIOLOGY & ADDITIONAL TESTS: Patient discussed on morning rounds with Dr. Fletcher     Operation / Date: Valve sparing aortic root and hemiarch replacement    SUBJECTIVE ASSESSMENT:  71y Male reports that his pain is much better controlled today. States that the increase in frequency of the pain meds has made a significant difference. He reports having in easier time sleeping last night as well. He has ambulated multiple times last night and this morning. States that his breathing is improved and he no longer feels breathless after ambulation.         Vital Signs Last 24 Hrs  T(C): 35.8 (08 Aug 2017 10:00), Max: 36.6 (07 Aug 2017 21:58)  T(F): 96.5 (08 Aug 2017 10:00), Max: 97.8 (07 Aug 2017 21:58)  HR: 69 (08 Aug 2017 10:00) (60 - 82)  BP: 128/60 (08 Aug 2017 10:00) (105/63 - 133/69)  BP(mean): 87 (08 Aug 2017 10:00) (78 - 91)  RR: 16 (08 Aug 2017 10:00) (16 - 20)  SpO2: 98% (08 Aug 2017 10:00) (94% - 98%)  I&O's Detail    07 Aug 2017 07:01  -  08 Aug 2017 07:00  --------------------------------------------------------  IN:    Oral Fluid: 1000 mL  Total IN: 1000 mL    OUT:    Chest Tube: 405 mL    Chest Tube: 220 mL    Voided: 425 mL  Total OUT: 1050 mL    Total NET: -50 mL      08 Aug 2017 07:01  -  08 Aug 2017 12:32  --------------------------------------------------------  IN:    Oral Fluid: 175 mL  Total IN: 175 mL    OUT:    Chest Tube: 190 mL    Chest Tube: 130 mL    Voided: 400 mL  Total OUT: 720 mL    Total NET: -545 mL          CHEST TUBE:  YES AIR LEAKS: No. Suction   JAM DRAIN:  No.  EPICARDIAL WIRES: No.  TIE DOWNS:No.  PAREDES: No.    PHYSICAL EXAM:    General: AOX3 in no acute distress.     Neurological: No focal neuro deficit.     Cardiovascular: RRR no murmurs rubs or gallops. + JVD     Respiratory: CTA bilaterally.     Gastrointestinal: soft non tender non distended. normal bowel sounds.     Extremities: WWP 2+ pitting edema.     Vascular: 1+ DP pulses bilaterally.     Incision Sites: MSI clean dry intact. No crepitus. No sternal click.     LABS:                        9.5    10.7  )-----------( 238      ( 08 Aug 2017 05:52 )             28.3       COUMADIN:  No.     PT/INR - ( 08 Aug 2017 05:52 )   PT: 13.1 sec;   INR: 1.18          PTT - ( 08 Aug 2017 05:52 )  PTT:27.2 sec    08-08    132<L>  |  95<L>  |  14  ----------------------------<  107<H>  4.6   |  25  |  1.10    Ca    8.5      08 Aug 2017 05:52  Phos  3.8     08-08  Mg     2.3     08-08            MEDICATIONS  (STANDING):  sodium chloride 0.45%. 1000 milliLiter(s) (10 mL/Hr) IV Continuous <Continuous>  heparin  Injectable 5000 Unit(s) SubCutaneous every 8 hours  tamsulosin 0.4 milliGRAM(s) Oral at bedtime  atorvastatin 40 milliGRAM(s) Oral at bedtime  dextrose 5%. 1000 milliLiter(s) (50 mL/Hr) IV Continuous <Continuous>  dextrose 50% Injectable 12.5 Gram(s) IV Push once  dextrose 50% Injectable 25 Gram(s) IV Push once  dextrose 50% Injectable 25 Gram(s) IV Push once  famotidine    Tablet 20 milliGRAM(s) Oral two times a day  aspirin enteric coated 81 milliGRAM(s) Oral daily  lidocaine   Patch 1 Patch Transdermal daily  docusate sodium 100 milliGRAM(s) Oral three times a day  senna 2 Tablet(s) Oral at bedtime  bicalutamide 50 milliGRAM(s) Oral daily  amiodarone    Tablet 200 milliGRAM(s) Oral daily  lidocaine   Patch 1 Patch Transdermal daily  metoclopramide Injectable 10 milliGRAM(s) IV Push once  fentaNYL    Injectable 12.5 MICROGram(s) IV Push once  metoprolol succinate ER 25 milliGRAM(s) Oral every 12 hours  furosemide    Tablet 40 milliGRAM(s) Oral daily  oxyCODONE    5 mG/acetaminophen 325 mG 1 Tablet(s) Oral once    MEDICATIONS  (PRN):  dextrose Gel 1 Dose(s) Oral once PRN Blood Glucose LESS THAN 70 milliGRAM(s)/deciliter  glucagon  Injectable 1 milliGRAM(s) IntraMuscular once PRN Glucose LESS THAN 70 milligrams/deciliter  acetaminophen   Tablet. 650 milliGRAM(s) Oral every 6 hours PRN Mild Pain (1 - 3)  traMADol 50 milliGRAM(s) Oral every 6 hours PRN Moderate Pain (4 - 6)  polyethylene glycol 3350 17 Gram(s) Oral daily PRN Constipation  magnesium hydroxide Suspension 30 milliLiter(s) Oral daily PRN Constipation  oxyCODONE    5 mG/acetaminophen 325 mG 2 Tablet(s) Oral every 4 hours PRN Severe Pain (7 - 10)        RADIOLOGY & ADDITIONAL TESTS:  < from: Xray Chest 1 View AP -PORTABLE-Routine (08.07.17 @ 06:16) >  INTERPRETATION:  Portable Chest X-Ray dated 8/7/2017 6:16 AM    Indication: Abnormal Chest Sounds    An AP portable view of the chest is compared to 8/6/2017. Slight increase   in the right lower lobe infiltrate. Probable new trace right pleural   effusion. No significant change in the left lower infiltrate and left   pleural effusion. Cardiomegaly. Bilateral pleural pigtail catheters are   again seen. No pneumothorax is noted. No other significant interval   changes are seen.    IMPRESSION:  Worsening of right lower lobe infiltrate. New trace right pleural   effusion. No other significant interval changes.    < end of copied text >    Chest XRAY 8/08/17 Worsening bilaterally effusion vs atelectasis       < from: Echocardiogram (08.08.17 @ 09:39) >  INTERPRETATION:  Patient Height: 187.0 cm  Patient Weight: 86.0 kg  Heart Rate: 73 bpm  Systolic Pressure: 128 mmHg  Diastolic Pressure: 77 mmHg  BSA: 2.1 m^2  Interpretation Summary  Left ventricular hypertrophy present. Abnormal (paradoxical) septal motion   consistent with post-operative status. The left ventricular ejection   fraction   is borderline normal. The left ventricular ejection fraction is   approximately   50%.  The left atrial size is normal. The right atrium is   dilated.Probably   normal right ventricular size and function.No aortic regurgitation   noted. No   hemodynamically significant valvular aortic stenosis.  There is mild   mitral   valve thickening.There is trace mitral regurgitation.There is moderate to   severe tricuspid regurgitation.There is mild pulmonary hypertension. The   pulmonary artery systolic pressure is estimated to be 45 mmHg.  There is   trace   to mild pulmonic regurgitation.No aortic root dilatation. Graft in   ascending   aorta.The pulmonary artery is dilatedThe inferior vena cava is normal in   size   (<2.1 cm) with normal inspiratory collapse (>50%) consistent with normal   right  atrial pressure.  There is no pericardial effusion.    < end of copied text > Patient discussed on morning rounds with Dr. Fletcher     Operation / Date: Valve sparing aortic root and hemiarch replacement    SUBJECTIVE ASSESSMENT:  71y Male reports that his pain is much better controlled today. States that the increase in frequency of the pain meds has made a significant difference. He reports having in easier time sleeping last night as well. He has ambulated multiple times last night and this morning. States that his breathing is improved and he no longer feels breathless after ambulation.         Vital Signs Last 24 Hrs  T(C): 35.8 (08 Aug 2017 10:00), Max: 36.6 (07 Aug 2017 21:58)  T(F): 96.5 (08 Aug 2017 10:00), Max: 97.8 (07 Aug 2017 21:58)  HR: 69 (08 Aug 2017 10:00) (60 - 82)  BP: 128/60 (08 Aug 2017 10:00) (105/63 - 133/69)  BP(mean): 87 (08 Aug 2017 10:00) (78 - 91)  RR: 16 (08 Aug 2017 10:00) (16 - 20)  SpO2: 98% (08 Aug 2017 10:00) (94% - 98%)  I&O's Detail    07 Aug 2017 07:01  -  08 Aug 2017 07:00  --------------------------------------------------------  IN:    Oral Fluid: 1000 mL  Total IN: 1000 mL    OUT:    Chest Tube: 405 mL    Chest Tube: 220 mL    Voided: 425 mL  Total OUT: 1050 mL    Total NET: -50 mL      08 Aug 2017 07:01  -  08 Aug 2017 12:32  --------------------------------------------------------  IN:    Oral Fluid: 175 mL  Total IN: 175 mL    OUT:    Chest Tube: 190 mL    Chest Tube: 130 mL    Voided: 400 mL  Total OUT: 720 mL    Total NET: -545 mL          CHEST TUBE:  YES AIR LEAKS: No. Suction   JAM DRAIN:  No.  EPICARDIAL WIRES: No.  TIE DOWNS:No.  PAREDES: No.    PHYSICAL EXAM:    General: AOX3 in no acute distress.     Neurological: No focal neuro deficit.     Cardiovascular: RRR no murmurs rubs or gallops.     Respiratory:  bibasilar rhonchi. no wheeze or rales      Gastrointestinal: soft non tender non distended. normal bowel sounds.     Extremities: WWP  1+ lower extremity edema.     Vascular: 2+ DP pulses bilaterally.     Incision Sites: MSI clean dry intact. No crepitus. No sternal click.     LABS:                        9.5    10.7  )-----------( 238      ( 08 Aug 2017 05:52 )             28.3       COUMADIN:  No.     PT/INR - ( 08 Aug 2017 05:52 )   PT: 13.1 sec;   INR: 1.18          PTT - ( 08 Aug 2017 05:52 )  PTT:27.2 sec    08-08    132<L>  |  95<L>  |  14  ----------------------------<  107<H>  4.6   |  25  |  1.10    Ca    8.5      08 Aug 2017 05:52  Phos  3.8     08-08  Mg     2.3     08-08            MEDICATIONS  (STANDING):  sodium chloride 0.45%. 1000 milliLiter(s) (10 mL/Hr) IV Continuous <Continuous>  heparin  Injectable 5000 Unit(s) SubCutaneous every 8 hours  tamsulosin 0.4 milliGRAM(s) Oral at bedtime  atorvastatin 40 milliGRAM(s) Oral at bedtime  dextrose 5%. 1000 milliLiter(s) (50 mL/Hr) IV Continuous <Continuous>  dextrose 50% Injectable 12.5 Gram(s) IV Push once  dextrose 50% Injectable 25 Gram(s) IV Push once  dextrose 50% Injectable 25 Gram(s) IV Push once  famotidine    Tablet 20 milliGRAM(s) Oral two times a day  aspirin enteric coated 81 milliGRAM(s) Oral daily  lidocaine   Patch 1 Patch Transdermal daily  docusate sodium 100 milliGRAM(s) Oral three times a day  senna 2 Tablet(s) Oral at bedtime  bicalutamide 50 milliGRAM(s) Oral daily  amiodarone    Tablet 200 milliGRAM(s) Oral daily  lidocaine   Patch 1 Patch Transdermal daily  metoclopramide Injectable 10 milliGRAM(s) IV Push once  fentaNYL    Injectable 12.5 MICROGram(s) IV Push once  metoprolol succinate ER 25 milliGRAM(s) Oral every 12 hours  furosemide    Tablet 40 milliGRAM(s) Oral daily  oxyCODONE    5 mG/acetaminophen 325 mG 1 Tablet(s) Oral once    MEDICATIONS  (PRN):  dextrose Gel 1 Dose(s) Oral once PRN Blood Glucose LESS THAN 70 milliGRAM(s)/deciliter  glucagon  Injectable 1 milliGRAM(s) IntraMuscular once PRN Glucose LESS THAN 70 milligrams/deciliter  acetaminophen   Tablet. 650 milliGRAM(s) Oral every 6 hours PRN Mild Pain (1 - 3)  traMADol 50 milliGRAM(s) Oral every 6 hours PRN Moderate Pain (4 - 6)  polyethylene glycol 3350 17 Gram(s) Oral daily PRN Constipation  magnesium hydroxide Suspension 30 milliLiter(s) Oral daily PRN Constipation  oxyCODONE    5 mG/acetaminophen 325 mG 2 Tablet(s) Oral every 4 hours PRN Severe Pain (7 - 10)        RADIOLOGY & ADDITIONAL TESTS:  < from: Xray Chest 1 View AP -PORTABLE-Routine (08.07.17 @ 06:16) >  INTERPRETATION:  Portable Chest X-Ray dated 8/7/2017 6:16 AM    Indication: Abnormal Chest Sounds    An AP portable view of the chest is compared to 8/6/2017. Slight increase   in the right lower lobe infiltrate. Probable new trace right pleural   effusion. No significant change in the left lower infiltrate and left   pleural effusion. Cardiomegaly. Bilateral pleural pigtail catheters are   again seen. No pneumothorax is noted. No other significant interval   changes are seen.    IMPRESSION:  Worsening of right lower lobe infiltrate. New trace right pleural   effusion. No other significant interval changes.    < end of copied text >    Chest XRAY 8/08/17 Worsening bilaterally effusion vs atelectasis       < from: Echocardiogram (08.08.17 @ 09:39) >  INTERPRETATION:  Patient Height: 187.0 cm  Patient Weight: 86.0 kg  Heart Rate: 73 bpm  Systolic Pressure: 128 mmHg  Diastolic Pressure: 77 mmHg  BSA: 2.1 m^2  Interpretation Summary  Left ventricular hypertrophy present. Abnormal (paradoxical) septal motion   consistent with post-operative status. The left ventricular ejection   fraction   is borderline normal. The left ventricular ejection fraction is   approximately   50%.  The left atrial size is normal. The right atrium is   dilated.Probably   normal right ventricular size and function.No aortic regurgitation   noted. No   hemodynamically significant valvular aortic stenosis.  There is mild   mitral   valve thickening.There is trace mitral regurgitation.There is moderate to   severe tricuspid regurgitation.There is mild pulmonary hypertension. The   pulmonary artery systolic pressure is estimated to be 45 mmHg.  There is   trace   to mild pulmonic regurgitation.No aortic root dilatation. Graft in   ascending   aorta.The pulmonary artery is dilatedThe inferior vena cava is normal in   size   (<2.1 cm) with normal inspiratory collapse (>50%) consistent with normal   right  atrial pressure.  There is no pericardial effusion.    < end of copied text >

## 2017-08-08 NOTE — PROGRESS NOTE ADULT - ASSESSMENT
71 year old male, former smoker, with pmHx of HTN, Systolic CHF (EF 40%), MI @ [facility] in 2016 (tx medically), questionable Afib (on aspirin only per patient), recurrent prostate cancer with metastasis to the vertebrae and lungs (dx 2011) s/p ADT and EBRT was recently advised to have another ADT/monotherapy with high dose androgen blocker for his prostate cancer, but refused the treatment now POD#5 s/p valve sparing aortic root and hemiarch replacement on 7/31/17, afib post op started on amiodarone now NSR and tolerating beta blocker. Bilateral pigtails placed yesterday for pleural effusions. Both still draining significant amount of fluid. Milked left tube today. Right tube with 300cc out overnight. Will have patient ambulate and re-evaluate for tube to be removed.     Plan:    Neurovascular: pain uncontrolled today. Increased frequency of percocet and now doing well.   Pain management on lidocaine patch, tylenol, tramadol and percocet prn.     Cardiovascular: Hemodynamically stable. Had Post op Afib. NSR now. Echo today stable with EF 50%. No aortic insufficiency. moderate to severe TR.   - ASA, atorvastatin    - HR controlled on amiodarone 200mg daily, metoprolol increased to 25mg q12hr after burst of afib. Now in NSR. No anti-coagulation as per matt.     Respiratory: Chest Xray worsened this AM.   02 Sat = 98% on RA.  - Right chest tube draining overnight >300. left tube milked and clot was dislodged. Will have patient ambulate and then get a chest xray. Re-evaluate the fluid.   -Encourage C+DB and Use of IS 10x / hr while awake.  -ambulate    GI: Lactulose given yesterday without a BM today  -PPX.  -PO Diet  -Bowel regimen on colace/senna    Renal / : Creatinine 1.1 today. History of prostatic cancer. Hyponatremic to 132.  - continue flomax.   - repeat BMP.   - Monitor renal function.  - Monitor I/O's.  - continue bicalutamide 50mg daily for history of prostatic cancer,     Endocrine:    -fsg well controlled on insulin sliding scale    Hematologic: H/H stable. 9.5/28.5  -CBC.  -Coagulation Panel.    ID:  -Temperature.  -CBC.    Prophylaxis:  -DVT prophylaxis with 5000 SubQ Heparin q8h.  -SCD's    Disposition:  -Discharge once chest tubes are removed.

## 2017-08-09 LAB
ANION GAP SERPL CALC-SCNC: 13 MMOL/L — SIGNIFICANT CHANGE UP (ref 5–17)
BASOPHILS NFR BLD AUTO: 0.2 % — SIGNIFICANT CHANGE UP (ref 0–2)
BUN SERPL-MCNC: 14 MG/DL — SIGNIFICANT CHANGE UP (ref 7–23)
CALCIUM SERPL-MCNC: 8.4 MG/DL — SIGNIFICANT CHANGE UP (ref 8.4–10.5)
CHLORIDE SERPL-SCNC: 93 MMOL/L — LOW (ref 96–108)
CO2 SERPL-SCNC: 25 MMOL/L — SIGNIFICANT CHANGE UP (ref 22–31)
CREAT SERPL-MCNC: 1.1 MG/DL — SIGNIFICANT CHANGE UP (ref 0.5–1.3)
EOSINOPHIL NFR BLD AUTO: 0.2 % — SIGNIFICANT CHANGE UP (ref 0–6)
GLUCOSE SERPL-MCNC: 124 MG/DL — HIGH (ref 70–99)
HCT VFR BLD CALC: 28.4 % — LOW (ref 39–50)
HGB BLD-MCNC: 9.6 G/DL — LOW (ref 13–17)
LYMPHOCYTES # BLD AUTO: 6.3 % — LOW (ref 13–44)
MAGNESIUM SERPL-MCNC: 2.2 MG/DL — SIGNIFICANT CHANGE UP (ref 1.6–2.6)
MCHC RBC-ENTMCNC: 31.6 PG — SIGNIFICANT CHANGE UP (ref 27–34)
MCHC RBC-ENTMCNC: 33.8 G/DL — SIGNIFICANT CHANGE UP (ref 32–36)
MCV RBC AUTO: 93.4 FL — SIGNIFICANT CHANGE UP (ref 80–100)
MONOCYTES NFR BLD AUTO: 8.3 % — SIGNIFICANT CHANGE UP (ref 2–14)
NEUTROPHILS NFR BLD AUTO: 85 % — HIGH (ref 43–77)
PHOSPHATE SERPL-MCNC: 3.2 MG/DL — SIGNIFICANT CHANGE UP (ref 2.5–4.5)
PLATELET # BLD AUTO: 295 K/UL — SIGNIFICANT CHANGE UP (ref 150–400)
POTASSIUM SERPL-MCNC: 4.2 MMOL/L — SIGNIFICANT CHANGE UP (ref 3.5–5.3)
POTASSIUM SERPL-SCNC: 4.2 MMOL/L — SIGNIFICANT CHANGE UP (ref 3.5–5.3)
RBC # BLD: 3.04 M/UL — LOW (ref 4.2–5.8)
RBC # FLD: 12.9 % — SIGNIFICANT CHANGE UP (ref 10.3–16.9)
SODIUM SERPL-SCNC: 131 MMOL/L — LOW (ref 135–145)
WBC # BLD: 11.5 K/UL — HIGH (ref 3.8–10.5)
WBC # FLD AUTO: 11.5 K/UL — HIGH (ref 3.8–10.5)

## 2017-08-09 PROCEDURE — 71010: CPT | Mod: 26

## 2017-08-09 RX ORDER — ENOXAPARIN SODIUM 100 MG/ML
90 INJECTION SUBCUTANEOUS EVERY 12 HOURS
Qty: 0 | Refills: 0 | Status: DISCONTINUED | OUTPATIENT
Start: 2017-08-09 | End: 2017-08-09

## 2017-08-09 RX ORDER — METOPROLOL TARTRATE 50 MG
25 TABLET ORAL ONCE
Qty: 0 | Refills: 0 | Status: COMPLETED | OUTPATIENT
Start: 2017-08-09 | End: 2017-08-09

## 2017-08-09 RX ORDER — APIXABAN 2.5 MG/1
1 TABLET, FILM COATED ORAL
Qty: 60 | Refills: 0 | OUTPATIENT
Start: 2017-08-09 | End: 2017-09-08

## 2017-08-09 RX ORDER — FUROSEMIDE 40 MG
20 TABLET ORAL ONCE
Qty: 0 | Refills: 0 | Status: COMPLETED | OUTPATIENT
Start: 2017-08-09 | End: 2017-08-09

## 2017-08-09 RX ORDER — METOPROLOL TARTRATE 50 MG
50 TABLET ORAL
Qty: 0 | Refills: 0 | Status: DISCONTINUED | OUTPATIENT
Start: 2017-08-09 | End: 2017-08-10

## 2017-08-09 RX ORDER — METOPROLOL TARTRATE 50 MG
2.5 TABLET ORAL ONCE
Qty: 0 | Refills: 0 | Status: COMPLETED | OUTPATIENT
Start: 2017-08-09 | End: 2017-08-09

## 2017-08-09 RX ORDER — APIXABAN 2.5 MG/1
5 TABLET, FILM COATED ORAL EVERY 12 HOURS
Qty: 0 | Refills: 0 | Status: DISCONTINUED | OUTPATIENT
Start: 2017-08-09 | End: 2017-08-10

## 2017-08-09 RX ORDER — METOPROLOL TARTRATE 50 MG
50 TABLET ORAL DAILY
Qty: 0 | Refills: 0 | Status: DISCONTINUED | OUTPATIENT
Start: 2017-08-09 | End: 2017-08-09

## 2017-08-09 RX ORDER — AMIODARONE HYDROCHLORIDE 400 MG/1
150 TABLET ORAL ONCE
Qty: 0 | Refills: 0 | Status: DISCONTINUED | OUTPATIENT
Start: 2017-08-09 | End: 2017-08-09

## 2017-08-09 RX ADMIN — APIXABAN 5 MILLIGRAM(S): 2.5 TABLET, FILM COATED ORAL at 18:02

## 2017-08-09 RX ADMIN — LIDOCAINE 1 PATCH: 4 CREAM TOPICAL at 12:02

## 2017-08-09 RX ADMIN — Medication 100 MILLIGRAM(S): at 14:57

## 2017-08-09 RX ADMIN — Medication 100 MILLIGRAM(S): at 21:34

## 2017-08-09 RX ADMIN — FAMOTIDINE 20 MILLIGRAM(S): 10 INJECTION INTRAVENOUS at 07:04

## 2017-08-09 RX ADMIN — Medication 100 MILLIGRAM(S): at 06:59

## 2017-08-09 RX ADMIN — Medication 2.5 MILLIGRAM(S): at 09:00

## 2017-08-09 RX ADMIN — LIDOCAINE 1 PATCH: 4 CREAM TOPICAL at 12:06

## 2017-08-09 RX ADMIN — AMIODARONE HYDROCHLORIDE 200 MILLIGRAM(S): 400 TABLET ORAL at 06:59

## 2017-08-09 RX ADMIN — FAMOTIDINE 20 MILLIGRAM(S): 10 INJECTION INTRAVENOUS at 18:02

## 2017-08-09 RX ADMIN — ATORVASTATIN CALCIUM 40 MILLIGRAM(S): 80 TABLET, FILM COATED ORAL at 21:34

## 2017-08-09 RX ADMIN — Medication 81 MILLIGRAM(S): at 12:01

## 2017-08-09 RX ADMIN — Medication 25 MILLIGRAM(S): at 09:04

## 2017-08-09 RX ADMIN — HEPARIN SODIUM 5000 UNIT(S): 5000 INJECTION INTRAVENOUS; SUBCUTANEOUS at 06:59

## 2017-08-09 RX ADMIN — BICALUTAMIDE 50 MILLIGRAM(S): 50 TABLET, FILM COATED ORAL at 12:06

## 2017-08-09 RX ADMIN — Medication 25 MILLIGRAM(S): at 06:59

## 2017-08-09 RX ADMIN — Medication 50 MILLIGRAM(S): at 18:02

## 2017-08-09 RX ADMIN — TAMSULOSIN HYDROCHLORIDE 0.4 MILLIGRAM(S): 0.4 CAPSULE ORAL at 21:34

## 2017-08-09 RX ADMIN — Medication 40 MILLIGRAM(S): at 07:00

## 2017-08-09 RX ADMIN — Medication 20 MILLIGRAM(S): at 13:44

## 2017-08-09 NOTE — PROGRESS NOTE ADULT - ASSESSMENT
71 year old male, former smoker, with pmHx of HTN, Systolic CHF (EF 40%), MI @ [facility] in 2016 (tx medically), questionable Afib (on aspirin only per patient), recurrent prostate cancer with metastasis to the vertebrae and lungs (dx 2011) s/p ADT and EBRT was recently advised to have another ADT/monotherapy with high dose androgen blocker for his prostate cancer, but refused the treatment now POD#5 s/p valve sparing aortic root and hemiarch replacement on 7/31/17, afib post op started on amiodarone now NSR and tolerating beta blocker. Bilateral pigtails placed yesterday for pleural effusions. On 8/8/17 both still draining significant amount of fluid. Left tube milked with increased drainage. Tubes were removed in the early evening after drainage slowed. No post pull PTX. Overnight patient returned into rapid afib wtih RVR. Now rate controlled with increased BB. Started on eliquis.     Plan:    Neurovascular: pain uncontrolled today. Increased frequency of percocet and now doing well.   - Pain management on lidocaine patch, tylenol, tramadol and percocet prn.     Cardiovascular: Hemodynamically stable. Had Post op Afib. NSR now. Echo today stable with EF 50%. No aortic insufficiency. moderate to severe TR. Nothing to do at this time.  - ASA, atorvastatin    - HR controlled on amiodarone 200mg daily, metoprolol increased to 50mg q12hr after burst of afib. Rate controlled Afib.  - started on elquis today   - given extra dose 20mg IV of Lasix     Respiratory: Subjective SOB. saturating well on RA. Crackles at right base. US without effusion. Gave an extra dose of IV Lasix. .   -Encourage C+DB and Use of IS 10x / hr while awake.  -ambulate    GI: Lactulose given yesterday with a BM today  -PPX.  -PO Diet  -Bowel regimen on colace/senna    Renal / : Creatinine 1.1 today. History of prostatic cancer. Hyponatremic to 132.  - continue flomax.   - repeat BMP.   - Monitor renal function.  - Monitor I/O's.  - continue bicalutamide 50mg daily for history of prostatic cancer,     Endocrine: HA1C 5.7   -fsg well controlled on insulin sliding scale    Hematologic: H/H stable. 9.5/28.5  -CBC.  -Coagulation Panel.    ID: WBC elevated today. Afebrile. No active signs of infection. All incision c/d/i.   - continue to monitor for signs of SIRS or Sepsis.     Prophylaxis:   -DVT on eliquis  -SCD's    Disposition:  -Discharge tomorrow.

## 2017-08-09 NOTE — PROGRESS NOTE ADULT - SUBJECTIVE AND OBJECTIVE BOX
Patient discussed on morning rounds with Dr. Fletcher     Operation / Date: 7/31/17: valve sparing aortic root and hemiarch replacement EF 45%      SUBJECTIVE ASSESSMENT:  71y Male reports feeling SOB today. States that he has noticed worsening in his incentive spirometer since yesterday. Denies pain, wheezing, chest pain, cough. States that he is feeling well otherwise. Patient was uncomfortable with the need for anticoagulation but after discussing the risks and benefits, he was much more comfortable. Patient is currently in atrial fibrillation but reports no dizziness, lightheadedness, palpitations, chest pain, n/v, confusion. No other complaints at this time.         Vital Signs Last 24 Hrs  T(C): 36.9 (09 Aug 2017 09:00), Max: 36.9 (09 Aug 2017 09:00)  T(F): 98.5 (09 Aug 2017 09:00), Max: 98.5 (09 Aug 2017 09:00)  HR: 92 (09 Aug 2017 10:00) (72 - 108)  BP: 114/- (09 Aug 2017 10:00) (112/57 - 133/69)  BP(mean): 90 (09 Aug 2017 10:00) (73 - 94)  RR: 16 (09 Aug 2017 10:00) (12 - 16)  SpO2: 94% (09 Aug 2017 10:00) (94% - 98%)  I&O's Detail    08 Aug 2017 07:01  -  09 Aug 2017 07:00  --------------------------------------------------------  IN:    Oral Fluid: 175 mL  Total IN: 175 mL    OUT:    Chest Tube: 190 mL    Chest Tube: 130 mL    Voided: 650 mL  Total OUT: 970 mL    Total NET: -795 mL      09 Aug 2017 07:01  -  09 Aug 2017 13:46  --------------------------------------------------------  IN:    Oral Fluid: 240 mL  Total IN: 240 mL    OUT:    Voided: 950 mL  Total OUT: 950 mL    Total NET: -710 mL          CHEST TUBE:  Yes/No. AIR LEAKS: Yes/No. Suction / H2O SEAL.   JAM DRAIN:  Yes/No.  EPICARDIAL WIRES: Yes/No.  TIE DOWNS: Yes/No.  PAREDES: Yes/No.    PHYSICAL EXAM:    General:     Neurological:    Cardiovascular:    Respiratory:    Gastrointestinal:    Extremities:    Vascular:    Incision Sites:    LABS:                        9.6    11.5  )-----------( 295      ( 09 Aug 2017 07:08 )             28.4       COUMADIN:  Yes/No. REASON: .    PT/INR - ( 08 Aug 2017 05:52 )   PT: 13.1 sec;   INR: 1.18          PTT - ( 08 Aug 2017 05:52 )  PTT:27.2 sec    08-09    131<L>  |  93<L>  |  14  ----------------------------<  124<H>  4.2   |  25  |  1.10    Ca    8.4      09 Aug 2017 07:08  Phos  3.2     08-09  Mg     2.2     08-09            MEDICATIONS  (STANDING):  sodium chloride 0.45%. 1000 milliLiter(s) (10 mL/Hr) IV Continuous <Continuous>  tamsulosin 0.4 milliGRAM(s) Oral at bedtime  atorvastatin 40 milliGRAM(s) Oral at bedtime  dextrose 5%. 1000 milliLiter(s) (50 mL/Hr) IV Continuous <Continuous>  dextrose 50% Injectable 12.5 Gram(s) IV Push once  dextrose 50% Injectable 25 Gram(s) IV Push once  dextrose 50% Injectable 25 Gram(s) IV Push once  famotidine    Tablet 20 milliGRAM(s) Oral two times a day  aspirin enteric coated 81 milliGRAM(s) Oral daily  lidocaine   Patch 1 Patch Transdermal daily  docusate sodium 100 milliGRAM(s) Oral three times a day  senna 2 Tablet(s) Oral at bedtime  bicalutamide 50 milliGRAM(s) Oral daily  amiodarone    Tablet 200 milliGRAM(s) Oral daily  lidocaine   Patch 1 Patch Transdermal daily  metoclopramide Injectable 10 milliGRAM(s) IV Push once  fentaNYL    Injectable 12.5 MICROGram(s) IV Push once  furosemide    Tablet 40 milliGRAM(s) Oral daily  oxyCODONE    5 mG/acetaminophen 325 mG 1 Tablet(s) Oral once  metoprolol 50 milliGRAM(s) Oral two times a day  apixaban 5 milliGRAM(s) Oral every 12 hours    MEDICATIONS  (PRN):  dextrose Gel 1 Dose(s) Oral once PRN Blood Glucose LESS THAN 70 milliGRAM(s)/deciliter  glucagon  Injectable 1 milliGRAM(s) IntraMuscular once PRN Glucose LESS THAN 70 milligrams/deciliter  acetaminophen   Tablet. 650 milliGRAM(s) Oral every 6 hours PRN Mild Pain (1 - 3)  polyethylene glycol 3350 17 Gram(s) Oral daily PRN Constipation  magnesium hydroxide Suspension 30 milliLiter(s) Oral daily PRN Constipation  oxyCODONE    5 mG/acetaminophen 325 mG 2 Tablet(s) Oral every 4 hours PRN Severe Pain (7 - 10)        RADIOLOGY & ADDITIONAL TESTS: Patient discussed on morning rounds with Dr. Fletcher     Operation / Date: 7/31/17: valve sparing aortic root and hemiarch replacement EF 45%      SUBJECTIVE ASSESSMENT:  71y Male reports feeling SOB today. States that he has noticed worsening in his incentive spirometer since yesterday. Denies pain, wheezing, chest pain, cough. States that he is feeling well otherwise. Patient was uncomfortable with the need for anticoagulation but after discussing the risks and benefits, he was much more comfortable. Patient is currently in atrial fibrillation but reports no dizziness, lightheadedness, palpitations, chest pain, n/v, confusion. No other complaints at this time.         Vital Signs Last 24 Hrs  T(C): 36.9 (09 Aug 2017 09:00), Max: 36.9 (09 Aug 2017 09:00)  T(F): 98.5 (09 Aug 2017 09:00), Max: 98.5 (09 Aug 2017 09:00)  HR: 92 (09 Aug 2017 10:00) (72 - 108)  BP: 114/- (09 Aug 2017 10:00) (112/57 - 133/69)  BP(mean): 90 (09 Aug 2017 10:00) (73 - 94)  RR: 16 (09 Aug 2017 10:00) (12 - 16)  SpO2: 94% (09 Aug 2017 10:00) (94% - 98%)  I&O's Detail    08 Aug 2017 07:01  -  09 Aug 2017 07:00  --------------------------------------------------------  IN:    Oral Fluid: 175 mL  Total IN: 175 mL    OUT:    Chest Tube: 190 mL    Chest Tube: 130 mL    Voided: 650 mL  Total OUT: 970 mL    Total NET: -795 mL      09 Aug 2017 07:01  -  09 Aug 2017 13:46  --------------------------------------------------------  IN:    Oral Fluid: 240 mL  Total IN: 240 mL    OUT:    Voided: 950 mL  Total OUT: 950 mL    Total NET: -710 mL          CHEST TUBE:  YES AIR LEAKS: No. Suction   JAM DRAIN:  No.  EPICARDIAL WIRES: No.  TIE DOWNS:No.  PAREDES: No.    PHYSICAL EXAM:    General: AOX3 in no acute distress.     Neurological: No focal neuro deficit.     Cardiovascular: RRR no murmurs rubs or gallops.     Respiratory:  bibasilar rhonchi. no wheeze or rales      Gastrointestinal: soft non tender non distended. normal bowel sounds.     Extremities: WWP  1+ lower extremity edema.     Vascular: 2+ DP pulses bilaterally.     Incision Sites: MSI clean dry intact. No crepitus. No sternal click.     LABS:                        9.6    11.5  )-----------( 295      ( 09 Aug 2017 07:08 )             28.4       COUMADIN: no eliquis .    PT/INR - ( 08 Aug 2017 05:52 )   PT: 13.1 sec;   INR: 1.18          PTT - ( 08 Aug 2017 05:52 )  PTT:27.2 sec    08-09    131<L>  |  93<L>  |  14  ----------------------------<  124<H>  4.2   |  25  |  1.10    Ca    8.4      09 Aug 2017 07:08  Phos  3.2     08-09  Mg     2.2     08-09            MEDICATIONS  (STANDING):  sodium chloride 0.45%. 1000 milliLiter(s) (10 mL/Hr) IV Continuous <Continuous>  tamsulosin 0.4 milliGRAM(s) Oral at bedtime  atorvastatin 40 milliGRAM(s) Oral at bedtime  dextrose 5%. 1000 milliLiter(s) (50 mL/Hr) IV Continuous <Continuous>  dextrose 50% Injectable 12.5 Gram(s) IV Push once  dextrose 50% Injectable 25 Gram(s) IV Push once  dextrose 50% Injectable 25 Gram(s) IV Push once  famotidine    Tablet 20 milliGRAM(s) Oral two times a day  aspirin enteric coated 81 milliGRAM(s) Oral daily  lidocaine   Patch 1 Patch Transdermal daily  docusate sodium 100 milliGRAM(s) Oral three times a day  senna 2 Tablet(s) Oral at bedtime  bicalutamide 50 milliGRAM(s) Oral daily  amiodarone    Tablet 200 milliGRAM(s) Oral daily  lidocaine   Patch 1 Patch Transdermal daily  metoclopramide Injectable 10 milliGRAM(s) IV Push once  fentaNYL    Injectable 12.5 MICROGram(s) IV Push once  furosemide    Tablet 40 milliGRAM(s) Oral daily  oxyCODONE    5 mG/acetaminophen 325 mG 1 Tablet(s) Oral once  metoprolol 50 milliGRAM(s) Oral two times a day  apixaban 5 milliGRAM(s) Oral every 12 hours    MEDICATIONS  (PRN):  dextrose Gel 1 Dose(s) Oral once PRN Blood Glucose LESS THAN 70 milliGRAM(s)/deciliter  glucagon  Injectable 1 milliGRAM(s) IntraMuscular once PRN Glucose LESS THAN 70 milligrams/deciliter  acetaminophen   Tablet. 650 milliGRAM(s) Oral every 6 hours PRN Mild Pain (1 - 3)  polyethylene glycol 3350 17 Gram(s) Oral daily PRN Constipation  magnesium hydroxide Suspension 30 milliLiter(s) Oral daily PRN Constipation  oxyCODONE    5 mG/acetaminophen 325 mG 2 Tablet(s) Oral every 4 hours PRN Severe Pain (7 - 10)        RADIOLOGY & ADDITIONAL TESTS:  < from: Xray Chest 1 View AP -PORTABLE-Routine (08.09.17 @ 06:16) >  INTERPRETATION:  Portable Chest X-Ray dated 8/9/2017 6:16 AM    Indication: follow up    An AP portable view of the chest is compared to 8/8/2017 at 7:52 PM. No   significant interval change in the bilateral lower lobe infiltrate and   trace bilateral pleural effusions. Thyromegaly. No pneumothorax is seen.    IMPRESSION:  No significant interval change.    < end of copied text > Patient discussed on morning rounds with Dr. Fletcher     Operation / Date: 7/31/17: valve sparing aortic root and hemiarch replacement EF 45%      SUBJECTIVE ASSESSMENT:  71y Male reports feeling SOB today. States that he has noticed worsening in his incentive spirometer since yesterday. Denies pain, wheezing, chest pain, cough. States that he is feeling well otherwise. Patient was uncomfortable with the need for anticoagulation but after discussing the risks and benefits, he was much more comfortable. Patient is currently in atrial fibrillation but reports no dizziness, lightheadedness, palpitations, chest pain, n/v, confusion. No other complaints at this time.         Vital Signs Last 24 Hrs  T(C): 36.9 (09 Aug 2017 09:00), Max: 36.9 (09 Aug 2017 09:00)  T(F): 98.5 (09 Aug 2017 09:00), Max: 98.5 (09 Aug 2017 09:00)  HR: 92 (09 Aug 2017 10:00) (72 - 108)  BP: 114/- (09 Aug 2017 10:00) (112/57 - 133/69)  BP(mean): 90 (09 Aug 2017 10:00) (73 - 94)  RR: 16 (09 Aug 2017 10:00) (12 - 16)  SpO2: 94% (09 Aug 2017 10:00) (94% - 98%)  I&O's Detail    08 Aug 2017 07:01  -  09 Aug 2017 07:00  --------------------------------------------------------  IN:    Oral Fluid: 175 mL  Total IN: 175 mL    OUT:    Chest Tube: 190 mL    Chest Tube: 130 mL    Voided: 650 mL  Total OUT: 970 mL    Total NET: -795 mL      09 Aug 2017 07:01  -  09 Aug 2017 13:46  --------------------------------------------------------  IN:    Oral Fluid: 240 mL  Total IN: 240 mL    OUT:    Voided: 950 mL  Total OUT: 950 mL    Total NET: -710 mL          CHEST TUBE:  No  JAM DRAIN:  No.  EPICARDIAL WIRES: No.  TIE DOWNS:No.  PAREDES: No.    PHYSICAL EXAM:    General: AOX3 in no acute distress.     Neurological: No focal neuro deficit.     Cardiovascular: irregular irregular  no murmurs rubs or gallops.     Respiratory:  right sided crackles. no wheeze or rhonchi    Gastrointestinal: soft non tender non distended. normal bowel sounds.     Extremities: WWP  1+ lower extremity edema.     Vascular: 2+ DP pulses bilaterally.     Incision Sites: MSI clean dry intact. No crepitus. No sternal click.     LABS:                        9.6    11.5  )-----------( 295      ( 09 Aug 2017 07:08 )             28.4       COUMADIN: no eliquis .    PT/INR - ( 08 Aug 2017 05:52 )   PT: 13.1 sec;   INR: 1.18          PTT - ( 08 Aug 2017 05:52 )  PTT:27.2 sec    08-09    131<L>  |  93<L>  |  14  ----------------------------<  124<H>  4.2   |  25  |  1.10    Ca    8.4      09 Aug 2017 07:08  Phos  3.2     08-09  Mg     2.2     08-09            MEDICATIONS  (STANDING):  sodium chloride 0.45%. 1000 milliLiter(s) (10 mL/Hr) IV Continuous <Continuous>  tamsulosin 0.4 milliGRAM(s) Oral at bedtime  atorvastatin 40 milliGRAM(s) Oral at bedtime  dextrose 5%. 1000 milliLiter(s) (50 mL/Hr) IV Continuous <Continuous>  dextrose 50% Injectable 12.5 Gram(s) IV Push once  dextrose 50% Injectable 25 Gram(s) IV Push once  dextrose 50% Injectable 25 Gram(s) IV Push once  famotidine    Tablet 20 milliGRAM(s) Oral two times a day  aspirin enteric coated 81 milliGRAM(s) Oral daily  lidocaine   Patch 1 Patch Transdermal daily  docusate sodium 100 milliGRAM(s) Oral three times a day  senna 2 Tablet(s) Oral at bedtime  bicalutamide 50 milliGRAM(s) Oral daily  amiodarone    Tablet 200 milliGRAM(s) Oral daily  lidocaine   Patch 1 Patch Transdermal daily  metoclopramide Injectable 10 milliGRAM(s) IV Push once  fentaNYL    Injectable 12.5 MICROGram(s) IV Push once  furosemide    Tablet 40 milliGRAM(s) Oral daily  oxyCODONE    5 mG/acetaminophen 325 mG 1 Tablet(s) Oral once  metoprolol 50 milliGRAM(s) Oral two times a day  apixaban 5 milliGRAM(s) Oral every 12 hours    MEDICATIONS  (PRN):  dextrose Gel 1 Dose(s) Oral once PRN Blood Glucose LESS THAN 70 milliGRAM(s)/deciliter  glucagon  Injectable 1 milliGRAM(s) IntraMuscular once PRN Glucose LESS THAN 70 milligrams/deciliter  acetaminophen   Tablet. 650 milliGRAM(s) Oral every 6 hours PRN Mild Pain (1 - 3)  polyethylene glycol 3350 17 Gram(s) Oral daily PRN Constipation  magnesium hydroxide Suspension 30 milliLiter(s) Oral daily PRN Constipation  oxyCODONE    5 mG/acetaminophen 325 mG 2 Tablet(s) Oral every 4 hours PRN Severe Pain (7 - 10)        RADIOLOGY & ADDITIONAL TESTS:  < from: Xray Chest 1 View AP -PORTABLE-Routine (08.09.17 @ 06:16) >  INTERPRETATION:  Portable Chest X-Ray dated 8/9/2017 6:16 AM    Indication: follow up    An AP portable view of the chest is compared to 8/8/2017 at 7:52 PM. No   significant interval change in the bilateral lower lobe infiltrate and   trace bilateral pleural effusions. Thyromegaly. No pneumothorax is seen.    IMPRESSION:  No significant interval change.    < end of copied text >

## 2017-08-10 VITALS — TEMPERATURE: 98 F

## 2017-08-10 DIAGNOSIS — Z87.09 PERSONAL HISTORY OF OTHER DISEASES OF THE RESPIRATORY SYSTEM: ICD-10-CM

## 2017-08-10 DIAGNOSIS — I71.2 THORACIC AORTIC ANEURYSM, WITHOUT RUPTURE: ICD-10-CM

## 2017-08-10 RX ORDER — METOPROLOL TARTRATE 50 MG
1 TABLET ORAL
Qty: 60 | Refills: 0
Start: 2017-08-10 | End: 2017-09-09

## 2017-08-10 RX ORDER — ACETAMINOPHEN 500 MG
2 TABLET ORAL
Qty: 240 | Refills: 0
Start: 2017-08-10 | End: 2017-09-09

## 2017-08-10 RX ORDER — AMIODARONE HYDROCHLORIDE 400 MG/1
1 TABLET ORAL
Qty: 30 | Refills: 0
Start: 2017-08-10 | End: 2017-09-09

## 2017-08-10 RX ORDER — POTASSIUM CHLORIDE 20 MEQ
1 PACKET (EA) ORAL
Qty: 30 | Refills: 0
Start: 2017-08-10 | End: 2017-09-09

## 2017-08-10 RX ORDER — OXYCODONE HYDROCHLORIDE 5 MG/1
2 TABLET ORAL
Qty: 48 | Refills: 0
Start: 2017-08-10 | End: 2017-08-14

## 2017-08-10 RX ORDER — APIXABAN 2.5 MG/1
1 TABLET, FILM COATED ORAL
Qty: 60 | Refills: 0
Start: 2017-08-10 | End: 2017-09-09

## 2017-08-10 RX ORDER — BICALUTAMIDE 50 MG/1
1 TABLET, FILM COATED ORAL
Qty: 0 | Refills: 0 | COMMUNITY

## 2017-08-10 RX ORDER — FUROSEMIDE 40 MG
1 TABLET ORAL
Qty: 30 | Refills: 0
Start: 2017-08-10 | End: 2017-09-09

## 2017-08-10 RX ORDER — DOCUSATE SODIUM 100 MG
1 CAPSULE ORAL
Qty: 90 | Refills: 0
Start: 2017-08-10 | End: 2017-09-09

## 2017-08-10 RX ORDER — ATORVASTATIN CALCIUM 80 MG/1
1 TABLET, FILM COATED ORAL
Qty: 0 | Refills: 0 | COMMUNITY

## 2017-08-10 RX ORDER — BICALUTAMIDE 50 MG/1
1 TABLET, FILM COATED ORAL
Qty: 30 | Refills: 0
Start: 2017-08-10 | End: 2017-09-09

## 2017-08-10 RX ORDER — ASPIRIN/CALCIUM CARB/MAGNESIUM 324 MG
1 TABLET ORAL
Qty: 30 | Refills: 0
Start: 2017-08-10 | End: 2017-09-09

## 2017-08-10 RX ORDER — TAMSULOSIN HYDROCHLORIDE 0.4 MG/1
1 CAPSULE ORAL
Qty: 30 | Refills: 0
Start: 2017-08-10 | End: 2017-09-09

## 2017-08-10 RX ORDER — POTASSIUM CHLORIDE 20 MEQ
1 PACKET (EA) ORAL
Qty: 0 | Refills: 0 | COMMUNITY

## 2017-08-10 RX ORDER — METOPROLOL TARTRATE 50 MG
1 TABLET ORAL
Qty: 0 | Refills: 0 | COMMUNITY

## 2017-08-10 RX ORDER — ATORVASTATIN CALCIUM 80 MG/1
1 TABLET, FILM COATED ORAL
Qty: 30 | Refills: 0
Start: 2017-08-10 | End: 2017-09-09

## 2017-08-10 RX ORDER — TAMSULOSIN HYDROCHLORIDE 0.4 MG/1
1 CAPSULE ORAL
Qty: 0 | Refills: 0 | COMMUNITY

## 2017-08-10 RX ORDER — LISINOPRIL 2.5 MG/1
1 TABLET ORAL
Qty: 0 | Refills: 0 | COMMUNITY

## 2017-08-10 RX ORDER — ASPIRIN/CALCIUM CARB/MAGNESIUM 324 MG
1 TABLET ORAL
Qty: 0 | Refills: 0 | COMMUNITY

## 2017-08-10 RX ADMIN — Medication 40 MILLIGRAM(S): at 06:28

## 2017-08-10 RX ADMIN — Medication 81 MILLIGRAM(S): at 11:01

## 2017-08-10 RX ADMIN — LIDOCAINE 1 PATCH: 4 CREAM TOPICAL at 11:01

## 2017-08-10 RX ADMIN — Medication 650 MILLIGRAM(S): at 09:23

## 2017-08-10 RX ADMIN — Medication 650 MILLIGRAM(S): at 10:23

## 2017-08-10 RX ADMIN — Medication 50 MILLIGRAM(S): at 06:28

## 2017-08-10 RX ADMIN — AMIODARONE HYDROCHLORIDE 200 MILLIGRAM(S): 400 TABLET ORAL at 06:28

## 2017-08-10 RX ADMIN — LIDOCAINE 1 PATCH: 4 CREAM TOPICAL at 00:00

## 2017-08-10 RX ADMIN — BICALUTAMIDE 50 MILLIGRAM(S): 50 TABLET, FILM COATED ORAL at 10:58

## 2017-08-10 RX ADMIN — APIXABAN 5 MILLIGRAM(S): 2.5 TABLET, FILM COATED ORAL at 06:28

## 2017-08-10 RX ADMIN — FAMOTIDINE 20 MILLIGRAM(S): 10 INJECTION INTRAVENOUS at 06:28

## 2017-08-10 NOTE — PROGRESS NOTE ADULT - ASSESSMENT
71 year old male, former smoker, with pmHx of HTN, Systolic CHF (EF 40%), MI @ [place] in 2016 (tx medically), questionable Afib (on aspirin only per patient), recurrent prostate cancer with metastasis to the vertebrae and lungs (dx 2011) s/p ADT and EBRT was recently advised to have another ADT/monotherapy with high dose androgen blocker for his prostate cancer, but refused the treatment and would like to explore alternative therapies per MD note.  CT scan (6/21/2017) showed aortic root measuring 5.6x 5.2cm with aneurysmal dilatation of ascending aorta. Patient was originally seen by Dr. Avalos from thoracic surgery for prostate cancer metastases to the lungs (biopsy proven by Dr. Romano). Patient was presented to tumor boards, and it was the general consensus that patient should have surgery to repair aorta and forego further treatment with oncology and urologist Dr. Gomez. Patient was then seen by Dr. Fletcher in the office for surgical evaluation.  He then underwent a valve-sparing root/ascending hemiarch replacement on 7/31/17.  Now ready for D/C home as per Dr. Fletcher.

## 2017-08-10 NOTE — PROGRESS NOTE ADULT - SUBJECTIVE AND OBJECTIVE BOX
Patient discussed on morning rounds with Dr. Fletcher    Operation / Date : Valve-sparing aortic root/hemiarch replacement on 7/31/17 EF 45% intra-op    Surgeon: Dr. Fletcher    Referring Physician: Janes Alcantara.     SUBJECTIVE ASSESSMENT:  He came in as SDA and s/p valve-sparing aortic root/hemiarch replacement on 7/31/17 EF 45% intra-op.  In ICU, received 1 unit PRBC on POD#0, was on pressors that were weaned off.  Then required Cleviprex.  Extubated POD#1.  Had post-op afib, and started on amio-load.  Received another PRBC POD#3 for hypotension; BB held.  Remains in NSR.  Transferred to floor on 8/4/17.  Beta-blocker restarted.  Had some vomiting, relieved with Zofran.  POD#6 had a-fib with RVR, titrated up beta-blocker. B/L pigtails placed for pleural effusions (500cc left, 600cc right).  Total drainage was 1.2L right, and 780cc left.  Converted to NSR.  Slowly weaned O2.  Chest tubes removed.  POD#9 had post-op afib again, converted to NSR.  Decision made to start Eliquis.  Prior authorization achieved.  Patient given extra lasix for SOB.  Now stable, on room air, ambulating well, tolerating PO diet.  Stable and ready for discharge home as discussed on morning rounds.     HPI: Patient feels well, using his IS regularly (pulling ~750-800cc).  Ambulating "a few times a day".  Walked once this morning already in the hallway.  No SOB upon ambulation.  Tolerating PO diet.  Voiding well, clear urine.  Feels ready to go home today.  Will call a friend to pick him up.  Has a roommate at home who will provide some support.     Vital Signs Last 24 Hrs  T(C): 37.1 (10 Aug 2017 05:00), Max: 37.1 (09 Aug 2017 14:00)  T(F): 98.7 (10 Aug 2017 05:00), Max: 98.8 (10 Aug 2017 01:00)  HR: 64 (10 Aug 2017 09:00) (64 - 102)  BP: 114/56 (10 Aug 2017 09:00) (107/63 - 137/69)  BP(mean): 81 (10 Aug 2017 09:00) (78 - 97)  RR: 18 (10 Aug 2017 09:00) (16 - 18)  SpO2: 96% (10 Aug 2017 09:00) (86% - 98%)  I&O's Detail    09 Aug 2017 07:01  -  10 Aug 2017 07:00  --------------------------------------------------------  IN:    Oral Fluid: 100 mL    Oral Fluid: 1020 mL  Total IN: 1120 mL    OUT:    Voided: 3200 mL    Voided: 600 mL  Total OUT: 3800 mL    Total NET: -2680 mL      10 Aug 2017 07:01  -  10 Aug 2017 10:06  --------------------------------------------------------  IN:  Total IN: 0 mL    OUT:    Voided: 400 mL  Total OUT: 400 mL    Total NET: -400 mL          EPICARDIAL WIRES REMOVED: Yes  TIE DOWNS REMOVED: Yes    PHYSICAL EXAM:    General: NAD, sitting in chair.  Looks comfortable.  Breathing well on room air.    Neurological: No focal deficits. I witnessed him ambulate, gait stable.     Cardiovascular: S1S2 regular.     Respiratory: slightly diminished on right base.  No wheezing.     Gastrointestinal: soft, non-tender    Extremities: warm and well perfused.  Trace non-pitting edema feet/lower legs.      Vascular: pulses intact peripherally.  Palpable.     Incision Sites: MSI open to air.  Clean and dry.  Healing nicely.  Chest tube sites all clean and dry    LABS:                        9.6    11.5  )-----------( 295      ( 09 Aug 2017 07:08 )             28.4       COUMADIN:  Yes/No.        DOSE:                  INDICATION:                GOAL INR:        08-09    131<L>  |  93<L>  |  14  ----------------------------<  124<H>  4.2   |  25  |  1.10    Ca    8.4      09 Aug 2017 07:08  Phos  3.2     08-09  Mg     2.2     08-09            MEDICATIONS  (STANDING):  sodium chloride 0.45%. 1000 milliLiter(s) (10 mL/Hr) IV Continuous <Continuous>  tamsulosin 0.4 milliGRAM(s) Oral at bedtime  atorvastatin 40 milliGRAM(s) Oral at bedtime  dextrose 5%. 1000 milliLiter(s) (50 mL/Hr) IV Continuous <Continuous>  dextrose 50% Injectable 12.5 Gram(s) IV Push once  dextrose 50% Injectable 25 Gram(s) IV Push once  dextrose 50% Injectable 25 Gram(s) IV Push once  famotidine    Tablet 20 milliGRAM(s) Oral two times a day  aspirin enteric coated 81 milliGRAM(s) Oral daily  lidocaine   Patch 1 Patch Transdermal daily  docusate sodium 100 milliGRAM(s) Oral three times a day  senna 2 Tablet(s) Oral at bedtime  bicalutamide 50 milliGRAM(s) Oral daily  amiodarone    Tablet 200 milliGRAM(s) Oral daily  lidocaine   Patch 1 Patch Transdermal daily  metoclopramide Injectable 10 milliGRAM(s) IV Push once  fentaNYL    Injectable 12.5 MICROGram(s) IV Push once  furosemide    Tablet 40 milliGRAM(s) Oral daily  oxyCODONE    5 mG/acetaminophen 325 mG 1 Tablet(s) Oral once  metoprolol 50 milliGRAM(s) Oral two times a day  apixaban 5 milliGRAM(s) Oral every 12 hours      Discharge CXR:< from: Xray Chest 1 View AP -PORTABLE-Routine (08.09.17 @ 06:16) >    An AP portable view of the chest is compared to 8/8/2017 at 7:52 PM. No   significant interval change in the bilateral lower lobe infiltrate and   trace bilateral pleural effusions. Thyromegaly. No pneumothorax is seen.    < end of copied text >      Discharge ECHO:< from: Echocardiogram (08.08.17 @ 09:39) >  PROCEDURE DATE:  08/08/2017      < end of copied text >    < from: Echocardiogram (08.08.17 @ 09:39) >  Left ventricular hypertrophy present. Abnormal (paradoxical) septal motion   consistent with post-operative status. The left ventricular ejection   fraction   is borderline normal. The left ventricular ejection fraction is   approximately   50%.  The left atrial size is normal. The right atrium is   dilated.Probably   normal right ventricular size and function.No aortic regurgitation   noted. No   hemodynamically significant valvular aortic stenosis.  There is mild   mitral   valve thickening.There is trace mitral regurgitation.There is moderate to   severe tricuspid regurgitation.There is mild pulmonary hypertension. The   pulmonary artery systolic pressure is estimated to be 45 mmHg.  There is   trace   to mild pulmonic regurgitation.No aortic root dilatation. Graft in   ascending   aorta.The pulmonary artery is dilatedThe inferior vena cava is normal in   size   (<2.1 cm) with normal inspiratory collapse (>50%) consistent with normal   right  atrial pressure.  There is no pericardial effusion.    < end of copied text >

## 2017-08-14 DIAGNOSIS — I42.9 CARDIOMYOPATHY, UNSPECIFIED: ICD-10-CM

## 2017-08-14 DIAGNOSIS — I71.2 THORACIC AORTIC ANEURYSM, WITHOUT RUPTURE: ICD-10-CM

## 2017-08-14 DIAGNOSIS — Z87.891 PERSONAL HISTORY OF NICOTINE DEPENDENCE: ICD-10-CM

## 2017-08-14 DIAGNOSIS — C61 MALIGNANT NEOPLASM OF PROSTATE: ICD-10-CM

## 2017-08-14 DIAGNOSIS — C78.00 SECONDARY MALIGNANT NEOPLASM OF UNSPECIFIED LUNG: ICD-10-CM

## 2017-08-14 DIAGNOSIS — I50.20 UNSPECIFIED SYSTOLIC (CONGESTIVE) HEART FAILURE: ICD-10-CM

## 2017-08-14 DIAGNOSIS — N40.1 BENIGN PROSTATIC HYPERPLASIA WITH LOWER URINARY TRACT SYMPTOMS: ICD-10-CM
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
## 2017-08-14 DIAGNOSIS — J40 BRONCHITIS, NOT SPECIFIED AS ACUTE OR CHRONIC: ICD-10-CM

## 2017-08-14 DIAGNOSIS — Z78.1 PHYSICAL RESTRAINT STATUS: ICD-10-CM

## 2017-08-14 DIAGNOSIS — C79.51 SECONDARY MALIGNANT NEOPLASM OF BONE: ICD-10-CM

## 2017-08-14 DIAGNOSIS — I97.89 OTHER POSTPROCEDURAL COMPLICATIONS AND DISORDERS OF THE CIRCULATORY SYSTEM, NOT ELSEWHERE CLASSIFIED: ICD-10-CM

## 2017-08-14 DIAGNOSIS — K21.9 GASTRO-ESOPHAGEAL REFLUX DISEASE WITHOUT ESOPHAGITIS: ICD-10-CM

## 2017-08-14 DIAGNOSIS — I48.0 PAROXYSMAL ATRIAL FIBRILLATION: ICD-10-CM

## 2017-08-14 DIAGNOSIS — I25.2 OLD MYOCARDIAL INFARCTION: ICD-10-CM

## 2017-08-14 DIAGNOSIS — I11.0 HYPERTENSIVE HEART DISEASE WITH HEART FAILURE: ICD-10-CM

## 2017-08-15 ENCOUNTER — FORM ENCOUNTER (OUTPATIENT)
Age: 72
End: 2017-08-15

## 2017-08-16 ENCOUNTER — OUTPATIENT (OUTPATIENT)
Dept: OUTPATIENT SERVICES | Facility: HOSPITAL | Age: 72
LOS: 1 days | End: 2017-08-16
Payer: MEDICARE

## 2017-08-16 ENCOUNTER — APPOINTMENT (OUTPATIENT)
Dept: CARDIOTHORACIC SURGERY | Facility: CLINIC | Age: 72
End: 2017-08-16
Payer: MEDICARE

## 2017-08-16 VITALS
RESPIRATION RATE: 18 BRPM | BODY MASS INDEX: 25.55 KG/M2 | WEIGHT: 199 LBS | SYSTOLIC BLOOD PRESSURE: 132 MMHG | OXYGEN SATURATION: 95 % | HEART RATE: 73 BPM | TEMPERATURE: 97.4 F | DIASTOLIC BLOOD PRESSURE: 65 MMHG

## 2017-08-16 DIAGNOSIS — S42.009A FRACTURE OF UNSPECIFIED PART OF UNSPECIFIED CLAVICLE, INITIAL ENCOUNTER FOR CLOSED FRACTURE: Chronic | ICD-10-CM

## 2017-08-16 DIAGNOSIS — Z09 ENCOUNTER FOR FOLLOW-UP EXAMINATION AFTER COMPLETED TREATMENT FOR CONDITIONS OTHER THAN MALIGNANT NEOPLASM: ICD-10-CM

## 2017-08-16 PROBLEM — Z87.09 HISTORY OF PLEURAL EFFUSION: Status: ACTIVE | Noted: 2017-08-16

## 2017-08-16 PROCEDURE — 99024 POSTOP FOLLOW-UP VISIT: CPT

## 2017-08-16 PROCEDURE — 71046 X-RAY EXAM CHEST 2 VIEWS: CPT

## 2017-08-16 PROCEDURE — 71020: CPT | Mod: 26

## 2017-08-16 RX ORDER — BICALUTAMIDE 50 MG/1
50 TABLET ORAL DAILY
Refills: 0 | Status: COMPLETED | COMMUNITY
Start: 2017-06-05 | End: 2017-08-16

## 2017-08-16 RX ORDER — BICALUTAMIDE 50 MG/1
50 TABLET ORAL DAILY
Qty: 30 | Refills: 5 | Status: COMPLETED | COMMUNITY
Start: 2017-05-31 | End: 2017-08-16

## 2017-08-16 RX ORDER — CYANOCOBALAMIN (VITAMIN B-12) 500 MCG
400 LOZENGE ORAL DAILY
Refills: 0 | Status: ACTIVE | COMMUNITY
Start: 2017-08-16

## 2017-08-16 RX ORDER — FLAXSEED OIL 1000 MG
1000 CAPSULE ORAL
Refills: 0 | Status: ACTIVE | COMMUNITY
Start: 2017-08-16

## 2017-08-16 RX ORDER — OMEGA-3S/DHA/EPA/FISH OIL/D3 300MG-1000
1200 CAPSULE ORAL
Refills: 0 | Status: ACTIVE | COMMUNITY
Start: 2017-08-16

## 2017-08-18 PROBLEM — Z09 POSTOP CHECK: Status: ACTIVE | Noted: 2017-08-16

## 2017-08-23 ENCOUNTER — APPOINTMENT (OUTPATIENT)
Dept: PULMONOLOGY | Facility: CLINIC | Age: 72
End: 2017-08-23
Payer: MEDICARE

## 2017-08-23 VITALS
BODY MASS INDEX: 24.9 KG/M2 | WEIGHT: 194 LBS | OXYGEN SATURATION: 97 % | TEMPERATURE: 97.3 F | HEIGHT: 74 IN | SYSTOLIC BLOOD PRESSURE: 140 MMHG | HEART RATE: 64 BPM | DIASTOLIC BLOOD PRESSURE: 82 MMHG

## 2017-08-23 PROCEDURE — 71020: CPT

## 2017-08-23 PROCEDURE — 99204 OFFICE O/P NEW MOD 45 MIN: CPT | Mod: 25

## 2017-08-23 PROCEDURE — 94010 BREATHING CAPACITY TEST: CPT

## 2017-08-30 ENCOUNTER — APPOINTMENT (OUTPATIENT)
Dept: PULMONOLOGY | Facility: CLINIC | Age: 72
End: 2017-08-30
Payer: MEDICARE

## 2017-08-30 ENCOUNTER — APPOINTMENT (OUTPATIENT)
Dept: UROLOGY | Facility: CLINIC | Age: 72
End: 2017-08-30
Payer: MEDICARE

## 2017-08-30 VITALS
BODY MASS INDEX: 24.13 KG/M2 | HEART RATE: 79 BPM | TEMPERATURE: 98.9 F | WEIGHT: 188 LBS | HEIGHT: 74 IN | DIASTOLIC BLOOD PRESSURE: 76 MMHG | SYSTOLIC BLOOD PRESSURE: 128 MMHG

## 2017-08-30 VITALS
SYSTOLIC BLOOD PRESSURE: 110 MMHG | DIASTOLIC BLOOD PRESSURE: 80 MMHG | HEIGHT: 74 IN | OXYGEN SATURATION: 96 % | BODY MASS INDEX: 24.13 KG/M2 | TEMPERATURE: 96.1 F | HEART RATE: 68 BPM | WEIGHT: 188 LBS

## 2017-08-30 PROCEDURE — 99215 OFFICE O/P EST HI 40 MIN: CPT | Mod: 25

## 2017-08-30 PROCEDURE — 71020: CPT

## 2017-08-30 PROCEDURE — 94010 BREATHING CAPACITY TEST: CPT

## 2017-08-30 PROCEDURE — 99213 OFFICE O/P EST LOW 20 MIN: CPT

## 2017-08-31 ENCOUNTER — LABORATORY RESULT (OUTPATIENT)
Age: 72
End: 2017-08-31

## 2017-08-31 ENCOUNTER — APPOINTMENT (OUTPATIENT)
Dept: HEMATOLOGY ONCOLOGY | Facility: CLINIC | Age: 72
End: 2017-08-31
Payer: MEDICARE

## 2017-08-31 VITALS
BODY MASS INDEX: 23.49 KG/M2 | SYSTOLIC BLOOD PRESSURE: 100 MMHG | TEMPERATURE: 97 F | HEIGHT: 74 IN | DIASTOLIC BLOOD PRESSURE: 70 MMHG | RESPIRATION RATE: 14 BRPM | WEIGHT: 183 LBS | OXYGEN SATURATION: 98 % | HEART RATE: 62 BPM

## 2017-08-31 PROCEDURE — 99214 OFFICE O/P EST MOD 30 MIN: CPT | Mod: 25

## 2017-08-31 PROCEDURE — 36415 COLL VENOUS BLD VENIPUNCTURE: CPT

## 2017-09-01 ENCOUNTER — RESULT REVIEW (OUTPATIENT)
Age: 72
End: 2017-09-01

## 2017-09-01 LAB
25(OH)D3 SERPL-MCNC: 24.6 NG/ML
ALBUMIN SERPL ELPH-MCNC: 3.9 G/DL
ALP BLD-CCNC: 117 U/L
ALT SERPL-CCNC: 20 U/L
ANION GAP SERPL CALC-SCNC: 18 MMOL/L
AST SERPL-CCNC: 38 U/L
BASOPHILS # BLD AUTO: 0.03 K/UL
BASOPHILS NFR BLD AUTO: 0.4 %
BILIRUB SERPL-MCNC: 0.2 MG/DL
BUN SERPL-MCNC: 13 MG/DL
CALCIUM SERPL-MCNC: 9.5 MG/DL
CHLORIDE SERPL-SCNC: 98 MMOL/L
CO2 SERPL-SCNC: 22 MMOL/L
CREAT SERPL-MCNC: 1.32 MG/DL
EOSINOPHIL # BLD AUTO: 0.11 K/UL
EOSINOPHIL NFR BLD AUTO: 1.4 %
GLUCOSE SERPL-MCNC: 111 MG/DL
HCT VFR BLD CALC: 31.8 %
HGB BLD-MCNC: 9.7 G/DL
IMM GRANULOCYTES NFR BLD AUTO: 0.6 %
LYMPHOCYTES # BLD AUTO: 1.03 K/UL
LYMPHOCYTES NFR BLD AUTO: 12.7 %
MAN DIFF?: NORMAL
MCHC RBC-ENTMCNC: 28.8 PG
MCHC RBC-ENTMCNC: 30.5 GM/DL
MCV RBC AUTO: 94.4 FL
MONOCYTES # BLD AUTO: 0.54 K/UL
MONOCYTES NFR BLD AUTO: 6.7 %
NEUTROPHILS # BLD AUTO: 6.34 K/UL
NEUTROPHILS NFR BLD AUTO: 78.2 %
PLATELET # BLD AUTO: 443 K/UL
POTASSIUM SERPL-SCNC: 4.6 MMOL/L
PROT SERPL-MCNC: 8.7 G/DL
RBC # BLD: 3.37 M/UL
RBC # FLD: 14 %
SODIUM SERPL-SCNC: 138 MMOL/L
TSH SERPL-ACNC: 5.66 UIU/ML
VIT B12 SERPL-MCNC: >2000 PG/ML
WBC # FLD AUTO: 8.1 K/UL

## 2017-09-08 ENCOUNTER — RX RENEWAL (OUTPATIENT)
Age: 72
End: 2017-09-08

## 2017-09-20 ENCOUNTER — APPOINTMENT (OUTPATIENT)
Dept: INTERNAL MEDICINE | Facility: CLINIC | Age: 72
End: 2017-09-20
Payer: MEDICARE

## 2017-09-20 VITALS
SYSTOLIC BLOOD PRESSURE: 92 MMHG | DIASTOLIC BLOOD PRESSURE: 54 MMHG | WEIGHT: 185 LBS | HEIGHT: 74 IN | HEART RATE: 98 BPM | TEMPERATURE: 98.3 F | RESPIRATION RATE: 12 BRPM | OXYGEN SATURATION: 100 % | BODY MASS INDEX: 23.74 KG/M2

## 2017-09-20 PROCEDURE — 99214 OFFICE O/P EST MOD 30 MIN: CPT

## 2017-09-22 ENCOUNTER — OTHER (OUTPATIENT)
Age: 72
End: 2017-09-22

## 2017-09-27 ENCOUNTER — APPOINTMENT (OUTPATIENT)
Dept: UROLOGY | Facility: CLINIC | Age: 72
End: 2017-09-27
Payer: MEDICARE

## 2017-09-27 VITALS
BODY MASS INDEX: 23.74 KG/M2 | DIASTOLIC BLOOD PRESSURE: 75 MMHG | TEMPERATURE: 97.6 F | HEART RATE: 101 BPM | HEIGHT: 74 IN | WEIGHT: 185 LBS | SYSTOLIC BLOOD PRESSURE: 112 MMHG

## 2017-09-27 PROCEDURE — 99213 OFFICE O/P EST LOW 20 MIN: CPT

## 2017-09-28 LAB
ALBUMIN SERPL ELPH-MCNC: 4.1 G/DL
ALP BLD-CCNC: 106 U/L
ALT SERPL-CCNC: 20 U/L
ANION GAP SERPL CALC-SCNC: 20 MMOL/L
AST SERPL-CCNC: 25 U/L
BILIRUB SERPL-MCNC: 0.2 MG/DL
BUN SERPL-MCNC: 33 MG/DL
CALCIUM SERPL-MCNC: 9.2 MG/DL
CHLORIDE SERPL-SCNC: 101 MMOL/L
CO2 SERPL-SCNC: 22 MMOL/L
CREAT SERPL-MCNC: 2.8 MG/DL
GLUCOSE SERPL-MCNC: 120 MG/DL
POTASSIUM SERPL-SCNC: 4.1 MMOL/L
PROT SERPL-MCNC: 8 G/DL
PSA SERPL-MCNC: 0.97 NG/ML
SODIUM SERPL-SCNC: 143 MMOL/L

## 2017-10-06 ENCOUNTER — EMERGENCY (EMERGENCY)
Facility: HOSPITAL | Age: 72
LOS: 1 days | Discharge: HOME | End: 2017-10-06
Attending: EMERGENCY MEDICINE | Admitting: EMERGENCY MEDICINE
Payer: MEDICARE

## 2017-10-06 VITALS
WEIGHT: 186.07 LBS | HEART RATE: 105 BPM | DIASTOLIC BLOOD PRESSURE: 56 MMHG | SYSTOLIC BLOOD PRESSURE: 90 MMHG | RESPIRATION RATE: 18 BRPM | HEIGHT: 74 IN | TEMPERATURE: 97 F | OXYGEN SATURATION: 95 %

## 2017-10-06 VITALS
RESPIRATION RATE: 16 BRPM | DIASTOLIC BLOOD PRESSURE: 71 MMHG | OXYGEN SATURATION: 97 % | SYSTOLIC BLOOD PRESSURE: 98 MMHG | TEMPERATURE: 97 F | HEART RATE: 104 BPM

## 2017-10-06 DIAGNOSIS — Z88.8 ALLERGY STATUS TO OTHER DRUGS, MEDICAMENTS AND BIOLOGICAL SUBSTANCES: ICD-10-CM

## 2017-10-06 DIAGNOSIS — Z79.899 OTHER LONG TERM (CURRENT) DRUG THERAPY: ICD-10-CM

## 2017-10-06 DIAGNOSIS — I11.0 HYPERTENSIVE HEART DISEASE WITH HEART FAILURE: ICD-10-CM

## 2017-10-06 DIAGNOSIS — I50.9 HEART FAILURE, UNSPECIFIED: ICD-10-CM

## 2017-10-06 DIAGNOSIS — Z87.891 PERSONAL HISTORY OF NICOTINE DEPENDENCE: ICD-10-CM

## 2017-10-06 DIAGNOSIS — Z79.1 LONG TERM (CURRENT) USE OF NON-STEROIDAL ANTI-INFLAMMATORIES (NSAID): ICD-10-CM

## 2017-10-06 DIAGNOSIS — I48.0 PAROXYSMAL ATRIAL FIBRILLATION: ICD-10-CM

## 2017-10-06 DIAGNOSIS — S42.009A FRACTURE OF UNSPECIFIED PART OF UNSPECIFIED CLAVICLE, INITIAL ENCOUNTER FOR CLOSED FRACTURE: Chronic | ICD-10-CM

## 2017-10-06 DIAGNOSIS — R06.02 SHORTNESS OF BREATH: ICD-10-CM

## 2017-10-06 DIAGNOSIS — Z79.891 LONG TERM (CURRENT) USE OF OPIATE ANALGESIC: ICD-10-CM

## 2017-10-06 LAB
ALBUMIN SERPL ELPH-MCNC: 4.2 G/DL — SIGNIFICANT CHANGE UP (ref 3.3–5)
ALP SERPL-CCNC: 119 U/L — SIGNIFICANT CHANGE UP (ref 40–120)
ALT FLD-CCNC: 22 U/L — SIGNIFICANT CHANGE UP (ref 10–45)
ANION GAP SERPL CALC-SCNC: 14 MMOL/L — SIGNIFICANT CHANGE UP (ref 5–17)
APPEARANCE UR: CLEAR — SIGNIFICANT CHANGE UP
APTT BLD: 28.3 SEC — SIGNIFICANT CHANGE UP (ref 27.5–37.4)
AST SERPL-CCNC: 34 U/L — SIGNIFICANT CHANGE UP (ref 10–40)
BASOPHILS NFR BLD AUTO: 0.5 % — SIGNIFICANT CHANGE UP (ref 0–2)
BILIRUB SERPL-MCNC: 0.4 MG/DL — SIGNIFICANT CHANGE UP (ref 0.2–1.2)
BILIRUB UR-MCNC: NEGATIVE — SIGNIFICANT CHANGE UP
BUN SERPL-MCNC: 18 MG/DL — SIGNIFICANT CHANGE UP (ref 7–23)
CALCIUM SERPL-MCNC: 9.6 MG/DL — SIGNIFICANT CHANGE UP (ref 8.4–10.5)
CHLORIDE SERPL-SCNC: 99 MMOL/L — SIGNIFICANT CHANGE UP (ref 96–108)
CK MB CFR SERPL CALC: <1 NG/ML — SIGNIFICANT CHANGE UP (ref 0–6.7)
CO2 SERPL-SCNC: 26 MMOL/L — SIGNIFICANT CHANGE UP (ref 22–31)
COLOR SPEC: YELLOW — SIGNIFICANT CHANGE UP
CREAT SERPL-MCNC: 1.38 MG/DL — HIGH (ref 0.5–1.3)
DIFF PNL FLD: NEGATIVE — SIGNIFICANT CHANGE UP
EOSINOPHIL NFR BLD AUTO: 2.6 % — SIGNIFICANT CHANGE UP (ref 0–6)
GLUCOSE SERPL-MCNC: 111 MG/DL — HIGH (ref 70–99)
GLUCOSE UR QL: NEGATIVE — SIGNIFICANT CHANGE UP
HCT VFR BLD CALC: 33.1 % — LOW (ref 39–50)
HGB BLD-MCNC: 11 G/DL — LOW (ref 13–17)
INR BLD: 1.26 — HIGH (ref 0.88–1.16)
KETONES UR-MCNC: NEGATIVE — SIGNIFICANT CHANGE UP
LEUKOCYTE ESTERASE UR-ACNC: NEGATIVE — SIGNIFICANT CHANGE UP
LYMPHOCYTES # BLD AUTO: 14.2 % — SIGNIFICANT CHANGE UP (ref 13–44)
MCHC RBC-ENTMCNC: 29.7 PG — SIGNIFICANT CHANGE UP (ref 27–34)
MCHC RBC-ENTMCNC: 33.2 G/DL — SIGNIFICANT CHANGE UP (ref 32–36)
MCV RBC AUTO: 89.5 FL — SIGNIFICANT CHANGE UP (ref 80–100)
MONOCYTES NFR BLD AUTO: 8 % — SIGNIFICANT CHANGE UP (ref 2–14)
NEUTROPHILS NFR BLD AUTO: 74.7 % — SIGNIFICANT CHANGE UP (ref 43–77)
NITRITE UR-MCNC: NEGATIVE — SIGNIFICANT CHANGE UP
PH UR: 7 — SIGNIFICANT CHANGE UP (ref 5–8)
PLATELET # BLD AUTO: 235 K/UL — SIGNIFICANT CHANGE UP (ref 150–400)
POTASSIUM SERPL-MCNC: 4 MMOL/L — SIGNIFICANT CHANGE UP (ref 3.5–5.3)
POTASSIUM SERPL-SCNC: 4 MMOL/L — SIGNIFICANT CHANGE UP (ref 3.5–5.3)
PROT SERPL-MCNC: 8.5 G/DL — HIGH (ref 6–8.3)
PROT UR-MCNC: (no result) MG/DL
PROTHROM AB SERPL-ACNC: 14.1 SEC — HIGH (ref 9.8–12.7)
RBC # BLD: 3.7 M/UL — LOW (ref 4.2–5.8)
RBC # FLD: 14.7 % — SIGNIFICANT CHANGE UP (ref 10.3–16.9)
SODIUM SERPL-SCNC: 139 MMOL/L — SIGNIFICANT CHANGE UP (ref 135–145)
SP GR SPEC: 1.01 — SIGNIFICANT CHANGE UP (ref 1–1.03)
TROPONIN T SERPL-MCNC: <0.01 NG/ML — SIGNIFICANT CHANGE UP (ref 0–0.01)
UROBILINOGEN FLD QL: 0.2 E.U./DL — SIGNIFICANT CHANGE UP
WBC # BLD: 7.4 K/UL — SIGNIFICANT CHANGE UP (ref 3.8–10.5)
WBC # FLD AUTO: 7.4 K/UL — SIGNIFICANT CHANGE UP (ref 3.8–10.5)

## 2017-10-06 PROCEDURE — 36415 COLL VENOUS BLD VENIPUNCTURE: CPT

## 2017-10-06 PROCEDURE — 87086 URINE CULTURE/COLONY COUNT: CPT

## 2017-10-06 PROCEDURE — 99284 EMERGENCY DEPT VISIT MOD MDM: CPT | Mod: 25

## 2017-10-06 PROCEDURE — 80053 COMPREHEN METABOLIC PANEL: CPT

## 2017-10-06 PROCEDURE — 93005 ELECTROCARDIOGRAM TRACING: CPT

## 2017-10-06 PROCEDURE — 71020: CPT | Mod: 26

## 2017-10-06 PROCEDURE — 82550 ASSAY OF CK (CPK): CPT

## 2017-10-06 PROCEDURE — 84484 ASSAY OF TROPONIN QUANT: CPT

## 2017-10-06 PROCEDURE — 71046 X-RAY EXAM CHEST 2 VIEWS: CPT

## 2017-10-06 PROCEDURE — 81001 URINALYSIS AUTO W/SCOPE: CPT

## 2017-10-06 PROCEDURE — 85025 COMPLETE CBC W/AUTO DIFF WBC: CPT

## 2017-10-06 PROCEDURE — 85730 THROMBOPLASTIN TIME PARTIAL: CPT

## 2017-10-06 PROCEDURE — 82553 CREATINE MB FRACTION: CPT

## 2017-10-06 PROCEDURE — 99285 EMERGENCY DEPT VISIT HI MDM: CPT | Mod: 25

## 2017-10-06 PROCEDURE — 93010 ELECTROCARDIOGRAM REPORT: CPT

## 2017-10-06 PROCEDURE — 85610 PROTHROMBIN TIME: CPT

## 2017-10-06 RX ORDER — SODIUM CHLORIDE 9 MG/ML
1000 INJECTION INTRAMUSCULAR; INTRAVENOUS; SUBCUTANEOUS ONCE
Qty: 0 | Refills: 0 | Status: COMPLETED | OUTPATIENT
Start: 2017-10-06 | End: 2017-10-06

## 2017-10-06 RX ORDER — ASPIRIN/CALCIUM CARB/MAGNESIUM 324 MG
325 TABLET ORAL ONCE
Qty: 0 | Refills: 0 | Status: COMPLETED | OUTPATIENT
Start: 2017-10-06 | End: 2017-10-06

## 2017-10-06 RX ADMIN — Medication 325 MILLIGRAM(S): at 14:54

## 2017-10-06 RX ADMIN — SODIUM CHLORIDE 2000 MILLILITER(S): 9 INJECTION INTRAMUSCULAR; INTRAVENOUS; SUBCUTANEOUS at 14:40

## 2017-10-06 NOTE — ED ADULT TRIAGE NOTE - CHIEF COMPLAINT QUOTE
"Last week I saw my urologist and he took a urine sample and said that the results indicated I might have a kidney problem and wanted me to come get checked out."

## 2017-10-06 NOTE — ED PROVIDER NOTE - MEDICAL DECISION MAKING DETAILS
?HUAN and generalized fatigue/SOB, initially hypotensive and tachycardic, improved with 1L IVF to normotensive with HR in low 80s, ambulating in ED w/out difficulty or SOB to and from bathroom. Low risk PE given that pt is anticoagulated on eliquis. No significant infiltrates or effusions on CXR. No other electrolyte abnormality. Pt to c/w [facility] rehab per Dr. Santiago, and has f/u appt in a few days. Attempts to discuss renal function with urologist were unsuccessful as he did not return calls.

## 2017-10-06 NOTE — ED PROVIDER NOTE - PHYSICAL EXAMINATION
VITAL SIGNS: I have reviewed nursing notes and confirm.  CONSTITUTIONAL: Well-developed; well-nourished; in no acute distress.  SKIN: Agree with RN documentation regarding decubitus evaluation. Remainder of skin exam is warm and dry, no acute rash.  HEAD: Normocephalic; atraumatic.  EYES: PERRL, EOM intact; conjunctiva and sclera clear.  ENT: No nasal discharge; airway clear.  NECK: Supple; non tender.  CARD: S1, S2 normal; no murmurs, gallops, or rubs. + tachycardic rhythm  RESP: No wheezes, rales or rhonchi, CTA b/l, good excursion, no inc wob  ABD: Normal bowel sounds; soft; non-distended; non-tender, well healing surgical scars  EXT: Normal ROM. No clubbing, cyanosis or edema. Non-ttp, intact distal pulses  LYMPH: No acute cervical adenopathy.  NEURO: Alert, oriented. No facial asymmetry, 5/5 strength all ext, no sensory deficits, speech intact, gait deferred  PSYCH: Cooperative, appropriate.

## 2017-10-06 NOTE — ED PROVIDER NOTE - PROGRESS NOTE DETAILS
d/w Dr. Santiago, who reports that pt's SOB and generalized fatigue as been present since open heart surgery after which pt did not receive adequate cardiac rehab and is only now initiating PT/rehab. ECHO 3 wks ago revealed EF of 50%, improved from prior. Pt to f/u in her office on Tuesday 10/10.

## 2017-10-06 NOTE — ED PROVIDER NOTE - CARE PLAN
Principal Discharge DX:	Shortness of breath  Secondary Diagnosis:	Congestive heart failure, unspecified congestive heart failure chronicity, unspecified congestive heart failure type  Secondary Diagnosis:	Paroxysmal atrial fibrillation

## 2017-10-06 NOTE — ED ADULT NURSE NOTE - OBJECTIVE STATEMENT
Patient to ED alert and orientedx3 complaining of intermittent dizziness and weakness x 2 weeks. "My oncologist did blood work and sent urine for me last week an then called to tell me that some of my kidney labs work abnormal and suggested I come here. I am currently on a chemo drug for prostate cancer and I'm just feeling depressed now because I used to be so active and now i'm finding out that my heart is weak due to the lupron that was administered to me." Patient arrives to ED slightly tachycardic with heart rate of 105, and BP of 90/60. Patient placed on continuous cardiac monitoring with EKG completed. IV bolus of normal saline initiated after lung sounds reveal no abnormalities or adventitious breath sounds. Lab work drawn and sent.

## 2017-10-07 LAB
CULTURE RESULTS: NO GROWTH — SIGNIFICANT CHANGE UP
SPECIMEN SOURCE: SIGNIFICANT CHANGE UP

## 2017-10-16 ENCOUNTER — APPOINTMENT (OUTPATIENT)
Dept: INTERNAL MEDICINE | Facility: CLINIC | Age: 72
End: 2017-10-16

## 2017-10-19 PROCEDURE — 85018 HEMOGLOBIN: CPT

## 2017-10-19 PROCEDURE — 86900 BLOOD TYPING SEROLOGIC ABO: CPT

## 2017-10-19 PROCEDURE — P9016: CPT

## 2017-10-19 PROCEDURE — 85027 COMPLETE CBC AUTOMATED: CPT

## 2017-10-19 PROCEDURE — 84295 ASSAY OF SERUM SODIUM: CPT

## 2017-10-19 PROCEDURE — P9035: CPT

## 2017-10-19 PROCEDURE — 88305 TISSUE EXAM BY PATHOLOGIST: CPT

## 2017-10-19 PROCEDURE — C1889: CPT

## 2017-10-19 PROCEDURE — 86850 RBC ANTIBODY SCREEN: CPT

## 2017-10-19 PROCEDURE — 93306 TTE W/DOPPLER COMPLETE: CPT

## 2017-10-19 PROCEDURE — 86901 BLOOD TYPING SEROLOGIC RH(D): CPT

## 2017-10-19 PROCEDURE — 84132 ASSAY OF SERUM POTASSIUM: CPT

## 2017-10-19 PROCEDURE — 83735 ASSAY OF MAGNESIUM: CPT

## 2017-10-19 PROCEDURE — 36430 TRANSFUSION BLD/BLD COMPNT: CPT

## 2017-10-19 PROCEDURE — 80053 COMPREHEN METABOLIC PANEL: CPT

## 2017-10-19 PROCEDURE — P9017: CPT

## 2017-10-19 PROCEDURE — 71045 X-RAY EXAM CHEST 1 VIEW: CPT

## 2017-10-19 PROCEDURE — 85730 THROMBOPLASTIN TIME PARTIAL: CPT

## 2017-10-19 PROCEDURE — 80048 BASIC METABOLIC PNL TOTAL CA: CPT

## 2017-10-19 PROCEDURE — 85610 PROTHROMBIN TIME: CPT

## 2017-10-19 PROCEDURE — 86923 COMPATIBILITY TEST ELECTRIC: CPT

## 2017-10-19 PROCEDURE — 82803 BLOOD GASES ANY COMBINATION: CPT

## 2017-10-19 PROCEDURE — 84100 ASSAY OF PHOSPHORUS: CPT

## 2017-10-19 PROCEDURE — C1768: CPT

## 2017-10-19 PROCEDURE — 80076 HEPATIC FUNCTION PANEL: CPT

## 2017-10-19 PROCEDURE — 36415 COLL VENOUS BLD VENIPUNCTURE: CPT

## 2017-10-19 PROCEDURE — 82330 ASSAY OF CALCIUM: CPT

## 2017-10-19 PROCEDURE — P9012: CPT

## 2017-10-19 PROCEDURE — 93005 ELECTROCARDIOGRAM TRACING: CPT

## 2017-10-19 PROCEDURE — 97116 GAIT TRAINING THERAPY: CPT

## 2017-10-19 PROCEDURE — 83605 ASSAY OF LACTIC ACID: CPT

## 2017-10-19 PROCEDURE — 94002 VENT MGMT INPAT INIT DAY: CPT

## 2017-10-19 PROCEDURE — 85025 COMPLETE CBC W/AUTO DIFF WBC: CPT

## 2017-10-19 PROCEDURE — 97162 PT EVAL MOD COMPLEX 30 MIN: CPT

## 2017-10-19 PROCEDURE — P9045: CPT

## 2017-11-03 ENCOUNTER — OTHER (OUTPATIENT)
Age: 72
End: 2017-11-03

## 2017-11-13 ENCOUNTER — APPOINTMENT (OUTPATIENT)
Dept: INTERNAL MEDICINE | Facility: CLINIC | Age: 72
End: 2017-11-13
Payer: MEDICARE

## 2017-11-13 VITALS
HEART RATE: 109 BPM | HEIGHT: 74 IN | TEMPERATURE: 98.2 F | WEIGHT: 192 LBS | DIASTOLIC BLOOD PRESSURE: 83 MMHG | SYSTOLIC BLOOD PRESSURE: 127 MMHG | RESPIRATION RATE: 12 BRPM | BODY MASS INDEX: 24.64 KG/M2 | OXYGEN SATURATION: 99 %

## 2017-11-13 DIAGNOSIS — K62.5 HEMORRHAGE OF ANUS AND RECTUM: ICD-10-CM

## 2017-11-13 PROCEDURE — 36415 COLL VENOUS BLD VENIPUNCTURE: CPT

## 2017-11-13 PROCEDURE — 99214 OFFICE O/P EST MOD 30 MIN: CPT | Mod: 25

## 2017-11-15 LAB
ALBUMIN SERPL ELPH-MCNC: 4.5 G/DL
ALP BLD-CCNC: 130 U/L
ALT SERPL-CCNC: 43 U/L
ANION GAP SERPL CALC-SCNC: 19 MMOL/L
AST SERPL-CCNC: 52 U/L
BASOPHILS # BLD AUTO: 0.04 K/UL
BASOPHILS NFR BLD AUTO: 0.6 %
BILIRUB SERPL-MCNC: 0.6 MG/DL
BUN SERPL-MCNC: 17 MG/DL
CALCIUM SERPL-MCNC: 9.1 MG/DL
CHLORIDE SERPL-SCNC: 102 MMOL/L
CO2 SERPL-SCNC: 19 MMOL/L
CREAT SERPL-MCNC: 1.23 MG/DL
EOSINOPHIL # BLD AUTO: 0.14 K/UL
EOSINOPHIL NFR BLD AUTO: 2.3 %
GLUCOSE SERPL-MCNC: 103 MG/DL
HCT VFR BLD CALC: 35.2 %
HGB BLD-MCNC: 11.4 G/DL
IMM GRANULOCYTES NFR BLD AUTO: 0.2 %
LYMPHOCYTES # BLD AUTO: 1.08 K/UL
LYMPHOCYTES NFR BLD AUTO: 17.4 %
MAN DIFF?: NORMAL
MCHC RBC-ENTMCNC: 30.1 PG
MCHC RBC-ENTMCNC: 32.4 GM/DL
MCV RBC AUTO: 92.9 FL
MONOCYTES # BLD AUTO: 0.39 K/UL
MONOCYTES NFR BLD AUTO: 6.3 %
NEUTROPHILS # BLD AUTO: 4.56 K/UL
NEUTROPHILS NFR BLD AUTO: 73.2 %
PLATELET # BLD AUTO: 208 K/UL
POTASSIUM SERPL-SCNC: 5.2 MMOL/L
PROT SERPL-MCNC: 8.4 G/DL
RBC # BLD: 3.79 M/UL
RBC # FLD: 16.5 %
SODIUM SERPL-SCNC: 140 MMOL/L
WBC # FLD AUTO: 6.22 K/UL

## 2017-12-20 ENCOUNTER — APPOINTMENT (OUTPATIENT)
Dept: UROLOGY | Facility: CLINIC | Age: 72
End: 2017-12-20
Payer: MEDICARE

## 2017-12-20 VITALS — TEMPERATURE: 98.2 F | HEART RATE: 97 BPM | DIASTOLIC BLOOD PRESSURE: 83 MMHG | SYSTOLIC BLOOD PRESSURE: 138 MMHG

## 2017-12-20 PROCEDURE — 99213 OFFICE O/P EST LOW 20 MIN: CPT

## 2017-12-21 ENCOUNTER — APPOINTMENT (OUTPATIENT)
Dept: OPHTHALMOLOGY | Facility: CLINIC | Age: 72
End: 2017-12-21

## 2017-12-21 LAB — PSA SERPL-MCNC: 0.45 NG/ML

## 2018-01-15 ENCOUNTER — FORM ENCOUNTER (OUTPATIENT)
Age: 73
End: 2018-01-15

## 2018-01-15 PROBLEM — M25.531 RIGHT WRIST PAIN: Status: ACTIVE | Noted: 2018-01-15

## 2018-01-16 ENCOUNTER — APPOINTMENT (OUTPATIENT)
Dept: ORTHOPEDIC SURGERY | Facility: CLINIC | Age: 73
End: 2018-01-16
Payer: MEDICARE

## 2018-01-16 ENCOUNTER — OUTPATIENT (OUTPATIENT)
Dept: OUTPATIENT SERVICES | Facility: HOSPITAL | Age: 73
LOS: 1 days | End: 2018-01-16
Payer: MEDICARE

## 2018-01-16 ENCOUNTER — APPOINTMENT (OUTPATIENT)
Dept: RADIOLOGY | Facility: CLINIC | Age: 73
End: 2018-01-16

## 2018-01-16 VITALS — RESPIRATION RATE: 12 BRPM | HEIGHT: 74 IN | BODY MASS INDEX: 24.64 KG/M2 | WEIGHT: 192 LBS

## 2018-01-16 DIAGNOSIS — M25.531 PAIN IN RIGHT WRIST: ICD-10-CM

## 2018-01-16 DIAGNOSIS — M18.11 UNILATERAL PRIMARY OSTEOARTHRITIS OF FIRST CARPOMETACARPAL JOINT, RIGHT HAND: ICD-10-CM

## 2018-01-16 DIAGNOSIS — G56.21 LESION OF ULNAR NERVE, RIGHT UPPER LIMB: ICD-10-CM

## 2018-01-16 DIAGNOSIS — S42.009A FRACTURE OF UNSPECIFIED PART OF UNSPECIFIED CLAVICLE, INITIAL ENCOUNTER FOR CLOSED FRACTURE: Chronic | ICD-10-CM

## 2018-01-16 PROCEDURE — 73130 X-RAY EXAM OF HAND: CPT

## 2018-01-16 PROCEDURE — 73110 X-RAY EXAM OF WRIST: CPT

## 2018-01-16 PROCEDURE — 73110 X-RAY EXAM OF WRIST: CPT | Mod: RT

## 2018-01-16 PROCEDURE — 73130 X-RAY EXAM OF HAND: CPT | Mod: 26,RT

## 2018-01-16 PROCEDURE — 99204 OFFICE O/P NEW MOD 45 MIN: CPT

## 2018-01-16 PROCEDURE — 73110 X-RAY EXAM OF WRIST: CPT | Mod: 26,RT

## 2018-01-16 PROCEDURE — 73130 X-RAY EXAM OF HAND: CPT | Mod: RT

## 2018-01-16 RX ORDER — PNV NO.95/FERROUS FUM/FOLIC AC 28MG-0.8MG
500-125 TABLET ORAL DAILY
Refills: 0 | Status: DISCONTINUED | COMMUNITY
Start: 2017-08-16 | End: 2018-01-16

## 2018-01-16 RX ORDER — AMIODARONE HYDROCHLORIDE 200 MG/1
200 TABLET ORAL DAILY
Qty: 30 | Refills: 1 | Status: DISCONTINUED | COMMUNITY
Start: 2017-08-16 | End: 2018-01-16

## 2018-01-16 RX ORDER — OXYCODONE AND ACETAMINOPHEN 5; 325 MG/1; MG/1
5-325 TABLET ORAL
Qty: 28 | Refills: 0 | Status: DISCONTINUED | COMMUNITY
Start: 2017-08-22 | End: 2018-01-16

## 2018-01-16 RX ORDER — ATORVASTATIN CALCIUM 40 MG/1
40 TABLET, FILM COATED ORAL DAILY
Qty: 30 | Refills: 0 | Status: DISCONTINUED | COMMUNITY
Start: 2017-08-16 | End: 2018-01-16

## 2018-01-16 RX ORDER — OXYCODONE AND ACETAMINOPHEN 5; 325 MG/1; MG/1
5-325 TABLET ORAL
Qty: 28 | Refills: 0 | Status: DISCONTINUED | COMMUNITY
Start: 2017-08-29 | End: 2018-01-16

## 2018-01-16 RX ORDER — UBIDECARENONE 200 MG
200 CAPSULE ORAL
Refills: 0 | Status: DISCONTINUED | COMMUNITY
Start: 2017-08-16 | End: 2018-01-16

## 2018-01-16 RX ORDER — APIXABAN 5 MG/1
5 TABLET, FILM COATED ORAL
Qty: 30 | Refills: 0 | Status: DISCONTINUED | COMMUNITY
Start: 2017-08-16 | End: 2018-01-16

## 2018-01-16 RX ORDER — ACETAMINOPHEN 325 MG/1
325 TABLET ORAL
Qty: 120 | Refills: 0 | Status: DISCONTINUED | COMMUNITY
Start: 2017-08-16 | End: 2018-01-16

## 2018-01-16 RX ORDER — OXYCODONE HYDROCHLORIDE AND ACETAMINOPHEN 5; 325 MG/1; MG/1
5-325 TABLET ORAL
Refills: 0 | Status: DISCONTINUED | COMMUNITY
Start: 2017-08-16 | End: 2018-01-16

## 2018-01-16 RX ORDER — FUROSEMIDE 40 MG/1
40 TABLET ORAL DAILY
Qty: 30 | Refills: 0 | Status: DISCONTINUED | COMMUNITY
Start: 2017-08-16 | End: 2018-01-16

## 2018-01-16 RX ORDER — TAMSULOSIN HYDROCHLORIDE 0.4 MG/1
0.4 CAPSULE ORAL
Refills: 0 | Status: DISCONTINUED | COMMUNITY
Start: 2017-08-16 | End: 2018-01-16

## 2018-01-16 RX ORDER — POTASSIUM CHLORIDE 750 MG/1
10 TABLET, FILM COATED, EXTENDED RELEASE ORAL DAILY
Qty: 7 | Refills: 0 | Status: DISCONTINUED | COMMUNITY
Start: 2017-08-16 | End: 2018-01-16

## 2018-01-17 PROBLEM — M18.11 ARTHRITIS OF CARPOMETACARPAL (CMC) JOINT OF RIGHT THUMB: Status: ACTIVE | Noted: 2018-01-17

## 2018-02-01 ENCOUNTER — APPOINTMENT (OUTPATIENT)
Dept: GASTROENTEROLOGY | Facility: CLINIC | Age: 73
End: 2018-02-01

## 2018-02-06 ENCOUNTER — APPOINTMENT (OUTPATIENT)
Dept: INTERNAL MEDICINE | Facility: CLINIC | Age: 73
End: 2018-02-06

## 2018-02-21 ENCOUNTER — OTHER (OUTPATIENT)
Age: 73
End: 2018-02-21

## 2018-02-27 ENCOUNTER — APPOINTMENT (OUTPATIENT)
Dept: ORTHOPEDIC SURGERY | Facility: CLINIC | Age: 73
End: 2018-02-27

## 2018-02-28 ENCOUNTER — APPOINTMENT (OUTPATIENT)
Dept: INTERNAL MEDICINE | Facility: CLINIC | Age: 73
End: 2018-02-28
Payer: MEDICARE

## 2018-02-28 VITALS
DIASTOLIC BLOOD PRESSURE: 90 MMHG | HEART RATE: 72 BPM | SYSTOLIC BLOOD PRESSURE: 147 MMHG | OXYGEN SATURATION: 94 % | WEIGHT: 189.15 LBS | BODY MASS INDEX: 24.28 KG/M2 | HEIGHT: 74 IN | TEMPERATURE: 98 F

## 2018-02-28 DIAGNOSIS — R53.1 WEAKNESS: ICD-10-CM

## 2018-02-28 PROCEDURE — 99214 OFFICE O/P EST MOD 30 MIN: CPT

## 2018-04-02 ENCOUNTER — APPOINTMENT (OUTPATIENT)
Dept: UROLOGY | Facility: CLINIC | Age: 73
End: 2018-04-02
Payer: MEDICARE

## 2018-04-02 VITALS — DIASTOLIC BLOOD PRESSURE: 77 MMHG | HEART RATE: 57 BPM | SYSTOLIC BLOOD PRESSURE: 129 MMHG | TEMPERATURE: 98.5 F

## 2018-04-02 PROCEDURE — 99213 OFFICE O/P EST LOW 20 MIN: CPT

## 2018-04-06 LAB — PSA SERPL-MCNC: 0.51 NG/ML

## 2018-04-10 ENCOUNTER — APPOINTMENT (OUTPATIENT)
Dept: INTERNAL MEDICINE | Facility: CLINIC | Age: 73
End: 2018-04-10

## 2018-05-02 ENCOUNTER — APPOINTMENT (OUTPATIENT)
Dept: INTERNAL MEDICINE | Facility: CLINIC | Age: 73
End: 2018-05-02
Payer: MEDICARE

## 2018-05-02 VITALS
SYSTOLIC BLOOD PRESSURE: 119 MMHG | BODY MASS INDEX: 24.38 KG/M2 | DIASTOLIC BLOOD PRESSURE: 65 MMHG | WEIGHT: 190 LBS | TEMPERATURE: 97.7 F | HEART RATE: 60 BPM | OXYGEN SATURATION: 98 % | RESPIRATION RATE: 14 BRPM | HEIGHT: 74 IN

## 2018-05-02 DIAGNOSIS — N62 HYPERTROPHY OF BREAST: ICD-10-CM

## 2018-05-02 DIAGNOSIS — R21 RASH AND OTHER NONSPECIFIC SKIN ERUPTION: ICD-10-CM

## 2018-05-02 PROCEDURE — 99214 OFFICE O/P EST MOD 30 MIN: CPT

## 2018-06-12 ENCOUNTER — RX RENEWAL (OUTPATIENT)
Age: 73
End: 2018-06-12

## 2018-07-02 ENCOUNTER — APPOINTMENT (OUTPATIENT)
Dept: INTERNAL MEDICINE | Facility: CLINIC | Age: 73
End: 2018-07-02

## 2018-08-01 ENCOUNTER — LABORATORY RESULT (OUTPATIENT)
Age: 73
End: 2018-08-01

## 2018-08-01 ENCOUNTER — APPOINTMENT (OUTPATIENT)
Dept: INTERNAL MEDICINE | Facility: CLINIC | Age: 73
End: 2018-08-01
Payer: MEDICARE

## 2018-08-01 VITALS
WEIGHT: 189 LBS | HEIGHT: 74 IN | TEMPERATURE: 98.3 F | BODY MASS INDEX: 24.26 KG/M2 | HEART RATE: 44 BPM | OXYGEN SATURATION: 100 %

## 2018-08-01 VITALS — DIASTOLIC BLOOD PRESSURE: 62 MMHG | SYSTOLIC BLOOD PRESSURE: 104 MMHG

## 2018-08-01 PROCEDURE — 99214 OFFICE O/P EST MOD 30 MIN: CPT

## 2018-08-01 RX ORDER — SACUBITRIL AND VALSARTAN 24; 26 MG/1; MG/1
24-26 TABLET, FILM COATED ORAL
Qty: 60 | Refills: 0 | Status: DISCONTINUED | COMMUNITY
Start: 2018-03-05 | End: 2018-08-01

## 2018-08-02 LAB
ALBUMIN SERPL ELPH-MCNC: 4.8 G/DL
ALP BLD-CCNC: 108 U/L
ALT SERPL-CCNC: 39 U/L
ANION GAP SERPL CALC-SCNC: 13 MMOL/L
AST SERPL-CCNC: 52 U/L
BASOPHILS # BLD AUTO: 0.03 K/UL
BASOPHILS NFR BLD AUTO: 0.6 %
BILIRUB SERPL-MCNC: 0.9 MG/DL
BUN SERPL-MCNC: 14 MG/DL
CALCIUM SERPL-MCNC: 9.7 MG/DL
CHLORIDE SERPL-SCNC: 101 MMOL/L
CO2 SERPL-SCNC: 28 MMOL/L
CREAT SERPL-MCNC: 1.37 MG/DL
EOSINOPHIL # BLD AUTO: 0.14 K/UL
EOSINOPHIL NFR BLD AUTO: 2.6 %
GLUCOSE SERPL-MCNC: 118 MG/DL
HCT VFR BLD CALC: 37.1 %
HGB BLD-MCNC: 12 G/DL
IMM GRANULOCYTES NFR BLD AUTO: 0.2 %
LYMPHOCYTES # BLD AUTO: 1.22 K/UL
LYMPHOCYTES NFR BLD AUTO: 22.7 %
MAN DIFF?: NORMAL
MCHC RBC-ENTMCNC: 31.6 PG
MCHC RBC-ENTMCNC: 32.3 GM/DL
MCV RBC AUTO: 97.6 FL
MONOCYTES # BLD AUTO: 0.32 K/UL
MONOCYTES NFR BLD AUTO: 5.9 %
NEUTROPHILS # BLD AUTO: 3.66 K/UL
NEUTROPHILS NFR BLD AUTO: 68 %
PLATELET # BLD AUTO: 179 K/UL
POTASSIUM SERPL-SCNC: 4.3 MMOL/L
PROT SERPL-MCNC: 7.8 G/DL
RBC # BLD: 3.8 M/UL
RBC # FLD: 14.4 %
SODIUM SERPL-SCNC: 142 MMOL/L
TSH SERPL-ACNC: 4.77 UIU/ML
WBC # FLD AUTO: 5.38 K/UL

## 2018-08-22 ENCOUNTER — APPOINTMENT (OUTPATIENT)
Dept: CARDIOTHORACIC SURGERY | Facility: CLINIC | Age: 73
End: 2018-08-22
Payer: MEDICARE

## 2018-08-28 ENCOUNTER — FORM ENCOUNTER (OUTPATIENT)
Age: 73
End: 2018-08-28

## 2018-08-29 ENCOUNTER — OUTPATIENT (OUTPATIENT)
Dept: OUTPATIENT SERVICES | Facility: HOSPITAL | Age: 73
LOS: 1 days | End: 2018-08-29
Payer: MEDICARE

## 2018-08-29 ENCOUNTER — APPOINTMENT (OUTPATIENT)
Dept: CARDIOTHORACIC SURGERY | Facility: CLINIC | Age: 73
End: 2018-08-29
Payer: MEDICARE

## 2018-08-29 VITALS
DIASTOLIC BLOOD PRESSURE: 59 MMHG | BODY MASS INDEX: 24.52 KG/M2 | HEART RATE: 72 BPM | TEMPERATURE: 97.3 F | RESPIRATION RATE: 18 BRPM | SYSTOLIC BLOOD PRESSURE: 100 MMHG | OXYGEN SATURATION: 99 % | WEIGHT: 191 LBS

## 2018-08-29 DIAGNOSIS — I71.2 THORACIC AORTIC ANEURYSM, W/OUT RUPTURE: ICD-10-CM

## 2018-08-29 DIAGNOSIS — S42.009A FRACTURE OF UNSPECIFIED PART OF UNSPECIFIED CLAVICLE, INITIAL ENCOUNTER FOR CLOSED FRACTURE: Chronic | ICD-10-CM

## 2018-08-29 DIAGNOSIS — Z86.79 OTHER SPECIFIED POSTPROCEDURAL STATES: ICD-10-CM

## 2018-08-29 DIAGNOSIS — Z98.890 OTHER SPECIFIED POSTPROCEDURAL STATES: ICD-10-CM

## 2018-08-29 DIAGNOSIS — I71.9 AORTIC ANEURYSM OF UNSPECIFIED SITE, WITHOUT RUPTURE: ICD-10-CM

## 2018-08-29 PROCEDURE — 99214 OFFICE O/P EST MOD 30 MIN: CPT

## 2018-08-29 PROCEDURE — 93306 TTE W/DOPPLER COMPLETE: CPT | Mod: 26

## 2018-08-29 PROCEDURE — 93306 TTE W/DOPPLER COMPLETE: CPT

## 2018-08-29 RX ORDER — ALBUTEROL SULFATE 90 UG/1
108 (90 BASE) AEROSOL, METERED RESPIRATORY (INHALATION)
Qty: 18 | Refills: 0 | Status: COMPLETED | COMMUNITY
Start: 2018-02-02 | End: 2018-08-29

## 2018-08-29 RX ORDER — IBUPROFEN 800 MG/1
800 TABLET, FILM COATED ORAL
Qty: 20 | Refills: 0 | Status: COMPLETED | COMMUNITY
Start: 2018-01-18 | End: 2018-08-29

## 2018-08-29 RX ORDER — METOPROLOL TARTRATE 25 MG/1
25 TABLET, FILM COATED ORAL TWICE DAILY
Qty: 60 | Refills: 2 | Status: COMPLETED | COMMUNITY
Start: 2017-08-16 | End: 2018-08-29

## 2018-08-29 RX ORDER — IBUPROFEN 600 MG/1
600 TABLET, FILM COATED ORAL 3 TIMES DAILY
Qty: 90 | Refills: 0 | Status: COMPLETED | COMMUNITY
Start: 2017-08-29 | End: 2018-08-29

## 2018-08-30 PROBLEM — I71.2 THORACIC AORTIC ANEURYSM WITHOUT RUPTURE: Status: ACTIVE | Noted: 2017-07-13

## 2018-08-30 PROBLEM — Z98.890 S/P AORTIC ANEURYSM REPAIR: Status: ACTIVE | Noted: 2017-08-16

## 2018-09-24 ENCOUNTER — APPOINTMENT (OUTPATIENT)
Dept: UROLOGY | Facility: CLINIC | Age: 73
End: 2018-09-24
Payer: MEDICARE

## 2018-09-24 VITALS — HEART RATE: 42 BPM | DIASTOLIC BLOOD PRESSURE: 73 MMHG | SYSTOLIC BLOOD PRESSURE: 128 MMHG | TEMPERATURE: 98.4 F

## 2018-09-24 PROCEDURE — 99213 OFFICE O/P EST LOW 20 MIN: CPT

## 2018-09-24 RX ORDER — TAMSULOSIN HYDROCHLORIDE 0.4 MG/1
0.4 CAPSULE ORAL
Qty: 30 | Refills: 5 | Status: DISCONTINUED | COMMUNITY
Start: 2018-02-21 | End: 2018-09-24

## 2018-09-28 ENCOUNTER — APPOINTMENT (OUTPATIENT)
Dept: HEART AND VASCULAR | Facility: CLINIC | Age: 73
End: 2018-09-28

## 2018-10-05 LAB — PSA SERPL-MCNC: 2.05 NG/ML

## 2018-10-10 ENCOUNTER — OTHER (OUTPATIENT)
Age: 73
End: 2018-10-10

## 2018-10-19 ENCOUNTER — OTHER (OUTPATIENT)
Age: 73
End: 2018-10-19

## 2018-10-22 ENCOUNTER — APPOINTMENT (OUTPATIENT)
Dept: HEART AND VASCULAR | Facility: CLINIC | Age: 73
End: 2018-10-22
Payer: MEDICARE

## 2018-10-22 ENCOUNTER — APPOINTMENT (OUTPATIENT)
Dept: HEART AND VASCULAR | Facility: CLINIC | Age: 73
End: 2018-10-22

## 2018-10-22 VITALS
TEMPERATURE: 97.7 F | HEIGHT: 74 IN | OXYGEN SATURATION: 97 % | HEART RATE: 97 BPM | RESPIRATION RATE: 18 BRPM | WEIGHT: 191 LBS | DIASTOLIC BLOOD PRESSURE: 70 MMHG | BODY MASS INDEX: 24.51 KG/M2 | SYSTOLIC BLOOD PRESSURE: 90 MMHG

## 2018-10-22 DIAGNOSIS — D64.9 ANEMIA, UNSPECIFIED: ICD-10-CM

## 2018-10-22 PROCEDURE — 93306 TTE W/DOPPLER COMPLETE: CPT

## 2018-10-22 PROCEDURE — 99205 OFFICE O/P NEW HI 60 MIN: CPT

## 2018-10-22 PROCEDURE — 36415 COLL VENOUS BLD VENIPUNCTURE: CPT

## 2018-10-22 PROCEDURE — 93224 XTRNL ECG REC UP TO 48 HRS: CPT

## 2018-10-22 RX ORDER — METOPROLOL TARTRATE 25 MG/1
25 TABLET, FILM COATED ORAL
Refills: 0 | Status: DISCONTINUED | COMMUNITY
Start: 2018-08-29 | End: 2018-10-22

## 2018-10-23 LAB
25(OH)D3 SERPL-MCNC: 29.7 NG/ML
ALBUMIN SERPL ELPH-MCNC: 4.6 G/DL
ALP BLD-CCNC: 140 U/L
ALT SERPL-CCNC: 35 U/L
ANION GAP SERPL CALC-SCNC: 19 MMOL/L
APPEARANCE: CLEAR
AST SERPL-CCNC: 53 U/L
BACTERIA: NEGATIVE
BASOPHILS # BLD AUTO: 0.02 K/UL
BASOPHILS NFR BLD AUTO: 0.3 %
BILIRUB SERPL-MCNC: 0.6 MG/DL
BILIRUBIN URINE: NEGATIVE
BLOOD URINE: NEGATIVE
BUN SERPL-MCNC: 19 MG/DL
CALCIUM SERPL-MCNC: 9.8 MG/DL
CHLORIDE SERPL-SCNC: 101 MMOL/L
CHOLEST SERPL-MCNC: 199 MG/DL
CHOLEST/HDLC SERPL: 3.5 RATIO
CO2 SERPL-SCNC: 24 MMOL/L
COLOR: YELLOW
CREAT SERPL-MCNC: 1.51 MG/DL
EOSINOPHIL # BLD AUTO: 0.16 K/UL
EOSINOPHIL NFR BLD AUTO: 2.7 %
FERRITIN SERPL-MCNC: 103 NG/ML
FOLATE SERPL-MCNC: >20 NG/ML
GLUCOSE QUALITATIVE U: NEGATIVE MG/DL
GLUCOSE SERPL-MCNC: 119 MG/DL
HCT VFR BLD CALC: 39.3 %
HDLC SERPL-MCNC: 57 MG/DL
HGB BLD-MCNC: 13.3 G/DL
HYALINE CASTS: 0 /LPF
IMM GRANULOCYTES NFR BLD AUTO: 0.2 %
KETONES URINE: NEGATIVE
LDLC SERPL CALC-MCNC: 114 MG/DL
LEUKOCYTE ESTERASE URINE: NEGATIVE
LYMPHOCYTES # BLD AUTO: 1.48 K/UL
LYMPHOCYTES NFR BLD AUTO: 24.9 %
MAGNESIUM SERPL-MCNC: 2.5 MG/DL
MAN DIFF?: NORMAL
MCHC RBC-ENTMCNC: 33.5 PG
MCHC RBC-ENTMCNC: 33.8 GM/DL
MCV RBC AUTO: 99 FL
MICROSCOPIC-UA: NORMAL
MONOCYTES # BLD AUTO: 0.32 K/UL
MONOCYTES NFR BLD AUTO: 5.4 %
NEUTROPHILS # BLD AUTO: 3.96 K/UL
NEUTROPHILS NFR BLD AUTO: 66.5 %
NITRITE URINE: NEGATIVE
NT-PROBNP SERPL-MCNC: 778 PG/ML
PH URINE: 7
PLATELET # BLD AUTO: 188 K/UL
POTASSIUM SERPL-SCNC: 4.6 MMOL/L
PROT SERPL-MCNC: 8.2 G/DL
PROTEIN URINE: NEGATIVE MG/DL
RBC # BLD: 3.97 M/UL
RBC # FLD: 13.1 %
RED BLOOD CELLS URINE: 1 /HPF
SODIUM SERPL-SCNC: 144 MMOL/L
SPECIFIC GRAVITY URINE: 1.01
SQUAMOUS EPITHELIAL CELLS: 1 /HPF
TRIGL SERPL-MCNC: 141 MG/DL
TSH SERPL-ACNC: 2.35 UIU/ML
UROBILINOGEN URINE: NEGATIVE MG/DL
VIT B12 SERPL-MCNC: 848 PG/ML
WBC # FLD AUTO: 5.95 K/UL
WHITE BLOOD CELLS URINE: 0 /HPF

## 2018-10-24 ENCOUNTER — APPOINTMENT (OUTPATIENT)
Dept: INTERNAL MEDICINE | Facility: CLINIC | Age: 73
End: 2018-10-24
Payer: MEDICARE

## 2018-10-24 VITALS
HEART RATE: 46 BPM | TEMPERATURE: 98 F | DIASTOLIC BLOOD PRESSURE: 68 MMHG | OXYGEN SATURATION: 97 % | HEIGHT: 74 IN | WEIGHT: 191 LBS | BODY MASS INDEX: 24.51 KG/M2 | SYSTOLIC BLOOD PRESSURE: 103 MMHG

## 2018-10-24 DIAGNOSIS — R79.89 OTHER SPECIFIED ABNORMAL FINDINGS OF BLOOD CHEMISTRY: ICD-10-CM

## 2018-10-24 PROCEDURE — 90732 PPSV23 VACC 2 YRS+ SUBQ/IM: CPT

## 2018-10-24 PROCEDURE — G0009: CPT

## 2018-10-24 PROCEDURE — 90662 IIV NO PRSV INCREASED AG IM: CPT

## 2018-10-24 PROCEDURE — G0008: CPT

## 2018-10-24 PROCEDURE — 99214 OFFICE O/P EST MOD 30 MIN: CPT | Mod: 25

## 2018-10-26 LAB
HBA1C MFR BLD HPLC: 6 %
TESTOST BND SERPL-MCNC: 4.3 PG/ML
TESTOST SERPL-MCNC: 708.2 NG/DL
UBIQUINONE10 SERPL-MCNC: 3.2 MG/L

## 2018-10-30 DIAGNOSIS — R19.5 OTHER FECAL ABNORMALITIES: ICD-10-CM

## 2018-10-30 LAB — HEMOCCULT STL QL IA: POSITIVE

## 2018-11-01 ENCOUNTER — APPOINTMENT (OUTPATIENT)
Dept: HEART AND VASCULAR | Facility: CLINIC | Age: 73
End: 2018-11-01
Payer: MEDICARE

## 2018-11-01 VITALS
RESPIRATION RATE: 18 BRPM | HEIGHT: 74 IN | WEIGHT: 190 LBS | BODY MASS INDEX: 24.38 KG/M2 | OXYGEN SATURATION: 98 % | DIASTOLIC BLOOD PRESSURE: 70 MMHG | HEART RATE: 71 BPM | TEMPERATURE: 97.6 F | SYSTOLIC BLOOD PRESSURE: 100 MMHG

## 2018-11-01 DIAGNOSIS — I95.2 HYPOTENSION DUE TO DRUGS: ICD-10-CM

## 2018-11-01 DIAGNOSIS — R00.1 BRADYCARDIA, UNSPECIFIED: ICD-10-CM

## 2018-11-01 PROCEDURE — 93000 ELECTROCARDIOGRAM COMPLETE: CPT

## 2018-11-01 PROCEDURE — 99214 OFFICE O/P EST MOD 30 MIN: CPT

## 2018-11-02 VITALS
RESPIRATION RATE: 16 BRPM | SYSTOLIC BLOOD PRESSURE: 80 MMHG | WEIGHT: 191 LBS | DIASTOLIC BLOOD PRESSURE: 60 MMHG | TEMPERATURE: 98.3 F | HEIGHT: 74 IN | OXYGEN SATURATION: 97 % | BODY MASS INDEX: 24.51 KG/M2 | HEART RATE: 55 BPM

## 2018-11-02 PROBLEM — R00.1 BRADYCARDIA, SINUS: Status: ACTIVE | Noted: 2018-11-02

## 2018-11-02 PROBLEM — I95.2 HYPOTENSION DUE TO DRUGS: Status: ACTIVE | Noted: 2018-11-02

## 2018-11-07 ENCOUNTER — APPOINTMENT (OUTPATIENT)
Dept: GASTROENTEROLOGY | Facility: CLINIC | Age: 73
End: 2018-11-07
Payer: MEDICARE

## 2018-11-07 VITALS
TEMPERATURE: 97.8 F | OXYGEN SATURATION: 99 % | SYSTOLIC BLOOD PRESSURE: 100 MMHG | DIASTOLIC BLOOD PRESSURE: 80 MMHG | HEART RATE: 60 BPM | RESPIRATION RATE: 14 BRPM | WEIGHT: 194 LBS | BODY MASS INDEX: 24.91 KG/M2

## 2018-11-07 PROCEDURE — 99205 OFFICE O/P NEW HI 60 MIN: CPT

## 2018-11-08 ENCOUNTER — APPOINTMENT (OUTPATIENT)
Dept: HEART AND VASCULAR | Facility: CLINIC | Age: 73
End: 2018-11-08

## 2018-11-08 ENCOUNTER — RESULT REVIEW (OUTPATIENT)
Age: 73
End: 2018-11-08

## 2018-12-12 ENCOUNTER — APPOINTMENT (OUTPATIENT)
Dept: HEART AND VASCULAR | Facility: CLINIC | Age: 73
End: 2018-12-12

## 2018-12-17 ENCOUNTER — APPOINTMENT (OUTPATIENT)
Dept: UROLOGY | Facility: CLINIC | Age: 73
End: 2018-12-17
Payer: MEDICARE

## 2018-12-17 VITALS — SYSTOLIC BLOOD PRESSURE: 133 MMHG | TEMPERATURE: 98.4 F | HEART RATE: 60 BPM | DIASTOLIC BLOOD PRESSURE: 64 MMHG

## 2018-12-17 DIAGNOSIS — Z00.00 ENCOUNTER FOR GENERAL ADULT MEDICAL EXAMINATION W/OUT ABNORMAL FINDINGS: ICD-10-CM

## 2018-12-17 PROCEDURE — 99214 OFFICE O/P EST MOD 30 MIN: CPT

## 2018-12-18 LAB
ALBUMIN SERPL ELPH-MCNC: 4.6 G/DL
ALP BLD-CCNC: 127 U/L
ALT SERPL-CCNC: 30 U/L
ANION GAP SERPL CALC-SCNC: 11 MMOL/L
AST SERPL-CCNC: 42 U/L
BILIRUB SERPL-MCNC: 0.5 MG/DL
BUN SERPL-MCNC: 14 MG/DL
CALCIUM SERPL-MCNC: 9.5 MG/DL
CHLORIDE SERPL-SCNC: 106 MMOL/L
CO2 SERPL-SCNC: 24 MMOL/L
CREAT SERPL-MCNC: 1.02 MG/DL
GLUCOSE SERPL-MCNC: 140 MG/DL
POTASSIUM SERPL-SCNC: 5.1 MMOL/L
PROT SERPL-MCNC: 7.7 G/DL
PSA SERPL-MCNC: 3.51 NG/ML
SODIUM SERPL-SCNC: 141 MMOL/L

## 2018-12-28 ENCOUNTER — MEDICATION RENEWAL (OUTPATIENT)
Age: 73
End: 2018-12-28

## 2019-01-03 ENCOUNTER — APPOINTMENT (OUTPATIENT)
Dept: CT IMAGING | Facility: HOSPITAL | Age: 74
End: 2019-01-03

## 2019-01-08 ENCOUNTER — APPOINTMENT (OUTPATIENT)
Dept: HEART AND VASCULAR | Facility: CLINIC | Age: 74
End: 2019-01-08
Payer: MEDICARE

## 2019-01-08 VITALS
OXYGEN SATURATION: 99 % | BODY MASS INDEX: 25.05 KG/M2 | WEIGHT: 195.19 LBS | DIASTOLIC BLOOD PRESSURE: 76 MMHG | HEART RATE: 57 BPM | TEMPERATURE: 98.1 F | HEIGHT: 74 IN | SYSTOLIC BLOOD PRESSURE: 130 MMHG

## 2019-01-08 PROCEDURE — 36415 COLL VENOUS BLD VENIPUNCTURE: CPT

## 2019-01-08 PROCEDURE — 93000 ELECTROCARDIOGRAM COMPLETE: CPT

## 2019-01-08 PROCEDURE — 99214 OFFICE O/P EST MOD 30 MIN: CPT

## 2019-01-09 LAB
ALBUMIN SERPL ELPH-MCNC: 4.6 G/DL
ALP BLD-CCNC: 115 U/L
ALT SERPL-CCNC: 30 U/L
ANION GAP SERPL CALC-SCNC: 13 MMOL/L
AST SERPL-CCNC: 41 U/L
BILIRUB SERPL-MCNC: 0.7 MG/DL
BUN SERPL-MCNC: 16 MG/DL
CALCIUM SERPL-MCNC: 9.3 MG/DL
CHLORIDE SERPL-SCNC: 108 MMOL/L
CO2 SERPL-SCNC: 24 MMOL/L
CREAT SERPL-MCNC: 1.16 MG/DL
GLUCOSE SERPL-MCNC: 88 MG/DL
MAGNESIUM SERPL-MCNC: 2.4 MG/DL
NT-PROBNP SERPL-MCNC: 2581 PG/ML
POTASSIUM SERPL-SCNC: 5 MMOL/L
PROT SERPL-MCNC: 7.7 G/DL
SODIUM SERPL-SCNC: 145 MMOL/L

## 2019-01-11 NOTE — REASON FOR VISIT
[Follow-Up - Clinic] : a clinic follow-up of [Dizziness] : dizziness [Heart Failure] : congestive heart failure [Hypertension] : hypertension [Medication Management] : Medication management [FreeTextEntry1] : 73  year old male with metastatic prostate cancer and hx of chronic hypertensive heart disease is s/p ascending aortic aneurysm repair  with preserved LVEF ~50%  refuses ICD in the past  but clear indication for ICD is lacking. \par \par He presents today for follow up evaluation  of SCHUMACHER  and worsening palpitations  after reducing Entresto dose to  1/2 his previous dose.   Dizziness has improved \par \par  He never did out patient cardiac rehab post cardiothoracic surgery. \par \par He denies chest pains, syncope, orthopnea  but feels weak all the time

## 2019-01-11 NOTE — DISCUSSION/SUMMARY
[Congestive Heart Failure] : congestive heart failure [Systolic Heart Failure] : systolic congestive heart failure [Stable] : stable [Compensated] : compensated [Sodium Restriction] : sodium restriction [___ Month(s)] : [unfilled] month(s) [With Me] : with me [FreeTextEntry1] : Entresto increase to 24/26 mg po BID\par \par Resume torsemide  20mg   twice weekly\par \par venipuncture with CMP, mag, BNP

## 2019-01-11 NOTE — PHYSICAL EXAM
[General Appearance - Well Developed] : well developed [Normal Appearance] : normal appearance [Well Groomed] : well groomed [General Appearance - Well Nourished] : well nourished [No Deformities] : no deformities [General Appearance - In No Acute Distress] : no acute distress [Normal Conjunctiva] : the conjunctiva exhibited no abnormalities [Eyelids - No Xanthelasma] : the eyelids demonstrated no xanthelasmas [Normal Oral Mucosa] : normal oral mucosa [No Oral Pallor] : no oral pallor [No Oral Cyanosis] : no oral cyanosis [Normal Jugular Venous A Waves Present] : normal jugular venous A waves present [Normal Jugular Venous V Waves Present] : normal jugular venous V waves present [No Jugular Venous Iglesias A Waves] : no jugular venous iglesias A waves [Respiration, Rhythm And Depth] : normal respiratory rhythm and effort [Exaggerated Use Of Accessory Muscles For Inspiration] : no accessory muscle use [Auscultation Breath Sounds / Voice Sounds] : lungs were clear to auscultation bilaterally [Heart Rate And Rhythm] : heart rate and rhythm were normal [Heart Sounds] : normal S1 and S2 [Murmurs] : no murmurs present [Arterial Pulses Normal] : the arterial pulses were normal [Edema] : no peripheral edema present [Bowel Sounds] : normal bowel sounds [Abdomen Soft] : soft [Abdomen Tenderness] : non-tender [Abdomen Mass (___ Cm)] : no abdominal mass palpated [Abnormal Walk] : normal gait [Gait - Sufficient For Exercise Testing] : the gait was sufficient for exercise testing [Nail Clubbing] : no clubbing of the fingernails [Cyanosis, Localized] : no localized cyanosis [Petechial Hemorrhages (___cm)] : no petechial hemorrhages [Nail Splinter Hemorrhages] : no splinter hemorrhages of the nails [Fingers Osler's Nodes] : Osler's nodes were not seenon the fingers [Skin Color & Pigmentation] : normal skin color and pigmentation [] : no rash [No Venous Stasis] : no venous stasis [Skin Lesions] : no skin lesions [No Skin Ulcers] : no skin ulcer [No Xanthoma] : no  xanthoma was observed [Oriented To Time, Place, And Person] : oriented to person, place, and time [Affect] : the affect was normal [Mood] : the mood was normal [FreeTextEntry1] : decreased skin turgor

## 2019-01-11 NOTE — HISTORY OF PRESENT ILLNESS
[FreeTextEntry1] : EKG shows  sinus bradycardia 52 bpm  with chronic inferolateral T wave inversions, no ectopy  with 1st degree AVB\par \par Weight gain on reduced torsemide dose 4 lbs \par \par Notes improved exercise capacity  and some palpitations but no syncope  and he is much less lightheaded

## 2019-01-11 NOTE — ASSESSMENT
[1st Degree AV Block (426.11\I44.0)] : ~T physical exam was performed [FreeTextEntry1] : compensated CHF\par \par \par

## 2019-01-21 ENCOUNTER — FORM ENCOUNTER (OUTPATIENT)
Age: 74
End: 2019-01-21

## 2019-01-22 ENCOUNTER — APPOINTMENT (OUTPATIENT)
Dept: CT IMAGING | Facility: HOSPITAL | Age: 74
End: 2019-01-22

## 2019-01-22 ENCOUNTER — OUTPATIENT (OUTPATIENT)
Dept: OUTPATIENT SERVICES | Facility: HOSPITAL | Age: 74
LOS: 1 days | End: 2019-01-22
Payer: MEDICARE

## 2019-01-22 DIAGNOSIS — S42.009A FRACTURE OF UNSPECIFIED PART OF UNSPECIFIED CLAVICLE, INITIAL ENCOUNTER FOR CLOSED FRACTURE: Chronic | ICD-10-CM

## 2019-01-22 PROCEDURE — 74177 CT ABD & PELVIS W/CONTRAST: CPT

## 2019-01-22 PROCEDURE — 71260 CT THORAX DX C+: CPT

## 2019-01-22 PROCEDURE — 71260 CT THORAX DX C+: CPT | Mod: 26

## 2019-01-22 PROCEDURE — 78306 BONE IMAGING WHOLE BODY: CPT | Mod: 26

## 2019-01-22 PROCEDURE — A9503: CPT

## 2019-01-22 PROCEDURE — 74177 CT ABD & PELVIS W/CONTRAST: CPT | Mod: 26

## 2019-01-22 PROCEDURE — 78306 BONE IMAGING WHOLE BODY: CPT

## 2019-01-28 ENCOUNTER — APPOINTMENT (OUTPATIENT)
Dept: INTERNAL MEDICINE | Facility: CLINIC | Age: 74
End: 2019-01-28
Payer: MEDICARE

## 2019-01-28 VITALS
OXYGEN SATURATION: 100 % | HEIGHT: 74 IN | WEIGHT: 194 LBS | SYSTOLIC BLOOD PRESSURE: 138 MMHG | DIASTOLIC BLOOD PRESSURE: 84 MMHG | BODY MASS INDEX: 24.9 KG/M2 | HEART RATE: 55 BPM | TEMPERATURE: 98.2 F

## 2019-01-28 DIAGNOSIS — R73.03 PREDIABETES.: ICD-10-CM

## 2019-01-28 PROCEDURE — 99214 OFFICE O/P EST MOD 30 MIN: CPT

## 2019-02-01 ENCOUNTER — APPOINTMENT (OUTPATIENT)
Dept: RADIATION ONCOLOGY | Facility: CLINIC | Age: 74
End: 2019-02-01

## 2019-02-04 PROBLEM — R91.1 PULMONARY LESION: Status: ACTIVE | Noted: 2017-06-05

## 2019-02-18 NOTE — H&P ADULT - FAMILY HISTORY
Mother  Still living? Unknown  Family history of diabetes mellitus, Age at diagnosis: Age Unknown No Residual Tumor Seen Histology Text: There were no malignant cells seen in the sections examined.

## 2019-02-27 ENCOUNTER — APPOINTMENT (OUTPATIENT)
Dept: HEART AND VASCULAR | Facility: CLINIC | Age: 74
End: 2019-02-27
Payer: MEDICARE

## 2019-02-27 VITALS
HEART RATE: 57 BPM | TEMPERATURE: 97.3 F | HEIGHT: 72.5 IN | DIASTOLIC BLOOD PRESSURE: 60 MMHG | BODY MASS INDEX: 25.73 KG/M2 | RESPIRATION RATE: 14 BRPM | OXYGEN SATURATION: 99 % | WEIGHT: 192.06 LBS | SYSTOLIC BLOOD PRESSURE: 140 MMHG

## 2019-02-27 PROCEDURE — 93000 ELECTROCARDIOGRAM COMPLETE: CPT

## 2019-02-27 PROCEDURE — 36415 COLL VENOUS BLD VENIPUNCTURE: CPT

## 2019-02-27 PROCEDURE — 99214 OFFICE O/P EST MOD 30 MIN: CPT

## 2019-02-28 LAB
ALBUMIN SERPL ELPH-MCNC: 4.5 G/DL
ALP BLD-CCNC: 143 U/L
ALT SERPL-CCNC: 29 U/L
ANION GAP SERPL CALC-SCNC: 13 MMOL/L
AST SERPL-CCNC: 42 U/L
BILIRUB SERPL-MCNC: 0.4 MG/DL
BUN SERPL-MCNC: 12 MG/DL
CALCIUM SERPL-MCNC: 9.5 MG/DL
CHLORIDE SERPL-SCNC: 103 MMOL/L
CO2 SERPL-SCNC: 24 MMOL/L
CREAT SERPL-MCNC: 0.99 MG/DL
MAGNESIUM SERPL-MCNC: 2.4 MG/DL
NT-PROBNP SERPL-MCNC: 1114 PG/ML
POTASSIUM SERPL-SCNC: 4.7 MMOL/L
PROT SERPL-MCNC: 8.1 G/DL
SODIUM SERPL-SCNC: 140 MMOL/L

## 2019-02-28 NOTE — ASSESSMENT
[FreeTextEntry1] : chronic HFref compensated\par \par metastatic   prostate cancer \par \par weakness and depression

## 2019-02-28 NOTE — REVIEW OF SYSTEMS
[Feeling Fatigued] : feeling fatigued [Dyspnea on exertion] : dyspnea during exertion [Limb Weakness (Paresis)] : limb weakness [Negative] : Heme/Lymph [Palpitations] : no palpitations

## 2019-02-28 NOTE — PHYSICAL EXAM
[General Appearance - Well Developed] : well developed [Normal Appearance] : normal appearance [Well Groomed] : well groomed [General Appearance - Well Nourished] : well nourished [No Deformities] : no deformities [General Appearance - In No Acute Distress] : no acute distress [Normal Conjunctiva] : the conjunctiva exhibited no abnormalities [Eyelids - No Xanthelasma] : the eyelids demonstrated no xanthelasmas [Normal Oral Mucosa] : normal oral mucosa [No Oral Pallor] : no oral pallor [No Oral Cyanosis] : no oral cyanosis [Normal Jugular Venous A Waves Present] : normal jugular venous A waves present [Normal Jugular Venous V Waves Present] : normal jugular venous V waves present [No Jugular Venous Iglesias A Waves] : no jugular venous iglesias A waves [Respiration, Rhythm And Depth] : normal respiratory rhythm and effort [Exaggerated Use Of Accessory Muscles For Inspiration] : no accessory muscle use [Auscultation Breath Sounds / Voice Sounds] : lungs were clear to auscultation bilaterally [Heart Rate And Rhythm] : heart rate and rhythm were normal [Heart Sounds] : normal S1 and S2 [Murmurs] : no murmurs present [Arterial Pulses Normal] : the arterial pulses were normal [Edema] : no peripheral edema present [Bowel Sounds] : normal bowel sounds [Abdomen Soft] : soft [Abdomen Tenderness] : non-tender [Abdomen Mass (___ Cm)] : no abdominal mass palpated [Abnormal Walk] : normal gait [Gait - Sufficient For Exercise Testing] : the gait was sufficient for exercise testing [Nail Clubbing] : no clubbing of the fingernails [Cyanosis, Localized] : no localized cyanosis [Petechial Hemorrhages (___cm)] : no petechial hemorrhages [Nail Splinter Hemorrhages] : no splinter hemorrhages of the nails [Fingers Osler's Nodes] : Osler's nodes were not seenon the fingers [Skin Color & Pigmentation] : normal skin color and pigmentation [] : no rash [No Venous Stasis] : no venous stasis [Skin Lesions] : no skin lesions [No Skin Ulcers] : no skin ulcer [No Xanthoma] : no  xanthoma was observed [FreeTextEntry1] : decreased skin turgor [Oriented To Time, Place, And Person] : oriented to person, place, and time [Affect] : the affect was normal [Mood] : the mood was normal

## 2019-02-28 NOTE — DISCUSSION/SUMMARY
[FreeTextEntry1] : Increase torsemide  to one tab Wed and Fridays\par \par venipuncture performed with BNP \par \par patient considering options for management of metastatic prostate cancer \par \par I would advise against colonoscopy at this time secondary to CHF - with no past hx of premalignant polyps for family hx of colorectal cancer,  Cologuard screening  should be sufficient  screening

## 2019-02-28 NOTE — HISTORY OF PRESENT ILLNESS
[FreeTextEntry1] : Most recent  BNP was elevated \par \par EKG shows  sinus bradycardia 50s with LVH  and strain \par \par No syncope, no edema\par \par Patient cut torsemide down to once weekly \par \par Diagnosed with prostate cancer 9 years ago - treated with radiation and Lupron. 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

## 2019-02-28 NOTE — REASON FOR VISIT
[Follow-Up - Clinic] : a clinic follow-up of [Abnormal ECG] : an abnormal ECG [Cardiomyopathy] : cardiomyopathy [Dyspnea] : dyspnea [FreeTextEntry1] : 73  year old male with metastatic prostate cancer and hx of chronic hypertensive heart disease is s/p ascending aortic aneurysm repair  with   LVEF ~40-50% %    clear indication for ICD is lacking as QRS is narrow. \par \par He presents today for follow up evaluation  of SCHUMACHER  and worsening palpitations  after reducing Entresto dose to  1/2 his previous dose.   Dizziness has improved \par \par  He never did out patient cardiac rehab post cardiothoracic surgery. \par \par He denies chest pains, syncope, orthopnea  but feels weak all the time

## 2019-03-02 LAB — UBIQUINONE10 SERPL-MCNC: 1.2 MG/L

## 2019-03-26 ENCOUNTER — OTHER (OUTPATIENT)
Age: 74
End: 2019-03-26

## 2019-04-03 ENCOUNTER — APPOINTMENT (OUTPATIENT)
Dept: HEART AND VASCULAR | Facility: CLINIC | Age: 74
End: 2019-04-03
Payer: MEDICARE

## 2019-04-03 VITALS
RESPIRATION RATE: 14 BRPM | TEMPERATURE: 97.3 F | HEIGHT: 77 IN | HEART RATE: 58 BPM | OXYGEN SATURATION: 99 % | WEIGHT: 187 LBS | SYSTOLIC BLOOD PRESSURE: 90 MMHG | DIASTOLIC BLOOD PRESSURE: 60 MMHG | BODY MASS INDEX: 22.08 KG/M2

## 2019-04-03 DIAGNOSIS — R63.8 OTHER SYMPTOMS AND SIGNS CONCERNING FOOD AND FLUID INTAKE: ICD-10-CM

## 2019-04-03 DIAGNOSIS — R55 SYNCOPE AND COLLAPSE: ICD-10-CM

## 2019-04-03 PROCEDURE — 99214 OFFICE O/P EST MOD 30 MIN: CPT

## 2019-04-03 PROCEDURE — 93000 ELECTROCARDIOGRAM COMPLETE: CPT

## 2019-04-05 ENCOUNTER — APPOINTMENT (OUTPATIENT)
Dept: RADIATION ONCOLOGY | Facility: CLINIC | Age: 74
End: 2019-04-05
Payer: MEDICARE

## 2019-04-05 VITALS
RESPIRATION RATE: 16 BRPM | WEIGHT: 192 LBS | HEART RATE: 62 BPM | BODY MASS INDEX: 22.77 KG/M2 | SYSTOLIC BLOOD PRESSURE: 144 MMHG | DIASTOLIC BLOOD PRESSURE: 87 MMHG | OXYGEN SATURATION: 98 %

## 2019-04-05 PROCEDURE — 99205 OFFICE O/P NEW HI 60 MIN: CPT | Mod: 25

## 2019-04-05 RX ORDER — DEGARELIX 80 MG
KIT SUBCUTANEOUS
Refills: 0 | Status: ACTIVE | COMMUNITY

## 2019-04-05 NOTE — REASON FOR VISIT
[Consideration for Non-Curative Therapy] : consideration for non-curative therapy for [Other: _____] : [unfilled]

## 2019-04-05 NOTE — VITALS
[Maximal Pain Intensity: 0/10] : 0/10 [Least Pain Intensity: 0/10] : 0/10 [80: Normal activity with effort; some signs or symptoms of disease.] : 80: Normal activity with effort; some signs or symptoms of disease.  [Date: ____________] : Patient's last distress assessment performed on [unfilled]. [1 - Distress Level] : Distress Level: 1

## 2019-04-07 PROBLEM — R63.8 DEHYDRATION SYMPTOMS: Status: ACTIVE | Noted: 2019-04-07

## 2019-04-07 NOTE — DISCUSSION/SUMMARY
[FreeTextEntry1] : hold diuretic for one week , then decrease to once weekly \par \par to see radiation oncologist \par \par refer to EP team  to assess for  EP study vs  ILR

## 2019-04-07 NOTE — REASON FOR VISIT
[Follow-Up - Clinic] : a clinic follow-up of [Cardiomyopathy] : cardiomyopathy [Hypertension] : hypertension [Medication Management] : Medication management [Syncope] : syncope [FreeTextEntry1] : 73  year old male with metastatic prostate cancer and hx of chronic hypertensive heart disease is s/p ascending aortic aneurysm repair  with   LVEF ~40-50% %    clear indication for ICD is lacking as QRS is narrow. \par \par He presents today for follow up evaluation  of SCHUMACHER  and reports recent syncope x 2\par \par  He never did out patient cardiac rehab post cardiothoracic surgery. \par \par He denies chest pains,  orthopnea  but feels weak all the time

## 2019-04-07 NOTE — HISTORY OF PRESENT ILLNESS
[FreeTextEntry1] : Has been feeling dry mouth, orthostasis \par \par BNP 1114\par \par metastatic prostate cancer  with elevated   testosterone \par \par recent blood work stable ,  no anemia \par \par LVEF ~ 40% \par \par

## 2019-04-08 NOTE — DISEASE MANAGEMENT
[Clinical] : TNM Stage: c [N/A] : Currently not applicable [FreeTextEntry4] : Metastatic [TTNM] : . [NTNM] : . [MTNM] : .

## 2019-04-08 NOTE — REVIEW OF SYSTEMS
[Patient Intake Form Reviewed] : Patient intake form was reviewed [Fatigue] : fatigue [SOB on Exertion] : shortness of breath during exertion [Negative] : Allergic/Immunologic [Fever] : no fever [Chills] : no chills [Cough] : no cough

## 2019-04-08 NOTE — PHYSICAL EXAM
[Normal] : oriented to person, place and time, the affect was normal, the mood was normal and not anxious [de-identified] : CN II- XII grossly intact. Sensation intact to LT and PP throughout.  Strength 5/5 BUE and BLE. Unable to illicit KJ reflex on the left, but MSR otherwise 2+.  Gait steady.

## 2019-04-08 NOTE — HISTORY OF PRESENT ILLNESS
[FreeTextEntry1] : Mr. Ran Lozoya is a 73M, former smoker (1 ppd x 40+ years, quit 2009), referred by Dr. 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  Of note, he has PMH notable for Prostate Adenocarcinoma.  As per outside records, he was diagnosed with [place] 4+4, pre-tx PSA of 72, clinical stage T3a or [id number], diagnosed in 2011.  He completed EBRT to prostate/ SV/ nodes for a dose of 81 Gy from 3/26/12- 5/29/12. He was treated by Dr. Alex Constantino [facility]/ [facility]. Also treated with ADT: 6 months of Lupron, but it was discontinued due to side effects of SOB.   \par \par He initially saw Dr. Gomez in June 2016 due to biochemical recurrence (PSA in June 2016 was 6.14). Dr. ARGUELLO continued to monitor his PSA, as well as CT and bone scan. CT on 12/30/16 noted metastatic disease to L4, as well as a nodule in RLL. In April 2017, he had EBUS-guided FNA, which showed metastatic prostatic adenocarcinoma. He was started on Bicalutamide in June 2017, which resulted in improvement of back pain. In June 2017, repeat CT CAP showed essentially stable disease with diffuse metastatic lymph nodes, lung nodule. and bone mets including a stable 2 cm sclerotic lesion on the L4 vertebral body. PSA decreased from 40 to eventually <1 on bicalutamide. When he saw Dr. Gomez on 12/17/18, PSA was noted to have increased from <1 to 2. Patient was also reporting increased lower back pain. Most recent PSA on 12/17/18 was 3.51. \par \par Most recent CT CAP on 1/22/19 showed the following:\par Marked decrease in the retroperitoneal adenopathy as compared to CT on 6/21/17. Progression of osteoblastic bony metastases to L3 and L4 vertebral bodies as compared to previous study: L3 involvement is new, L4 involvement is increased in size.  No other bony metastasis identified. \par \par Bone scan on 1/22/19 revealed the following:\par There has been an increase in the size of the abnormal activity in the L4 vertebral body, consistent with worsening of metastatic \par disease. There is also new abnormal activity within the L2, L3, and L5 vertebral bodies, consistent with metastatic disease. There is irregular activity in the thoracic spine which is likely due to degenerative change. Increased activity in proximal to mid left femur is again consistent with Paget's disease.\par \par He was advised to follow up with Oncology for possible treatment options.  He has been under the care of Dr. Jose G Garnett; has a follow up later today. He was started on Firmagon a month ago. He continues to take Bicalutamide. \par \par Of note, he saw his cardiologist Dr. Sullivan on 4/3/19, noted to have two recent episodes of syncope. Plan was to hold diuretic for one week, then decrease to once weekly. \par \par Today, he denies any pain to include back pain. Denies LE weakness, bowel or bladder dysfunction. On occasion, he experiences intermittent numbness in the plantar surface of both feet that resolves after he takes Torsemide. He reports SOB with moderate exertion since starting Firmagon. Also reports 30 pound weight loss since his open heart surgery in 2017, but weight has remained stable for some time. He also reports fatigue. Denies fever, chills, CP, N/V/D, or any other c/o. \par \par Of note, he has PMH significant for CHF, cardiomyopathy, 1st degree AV block, aortic aneurysm repair, A-fib s/p ablation, pre- DM; followed by Cardiologist Dr. Sullivan and PCP Dr. Santiago. \par \par Patient denies any known family history of cancer.

## 2019-04-12 ENCOUNTER — CLINICAL ADVICE (OUTPATIENT)
Age: 74
End: 2019-04-12

## 2019-04-15 NOTE — DISCHARGE NOTE ADULT - NS AS ACTIVITY OBS
Called patient to advise/confirm upcoming appt with Dr. Buzz Sanders on  4/16/19    at 10:30am at [facility]. No answer and unable to leave voicemail since mailbox is full. Called patient's father Mr. Juan Dela Cruz and he will remind patient of appt. Also advised to please bring in your Drivers License and Insurance Card with you to appointment as well as any prescription pain medication in the original container with you to appt. Walking-Indoors allowed/No Heavy lifting/straining/Do not make important decisions/Do not drive or operate machinery/Walking-Outdoors allowed/Showering allowed/Stairs allowed

## 2019-04-16 ENCOUNTER — APPOINTMENT (OUTPATIENT)
Dept: RADIATION ONCOLOGY | Facility: CLINIC | Age: 74
End: 2019-04-16
Payer: MEDICARE

## 2019-04-16 VITALS
HEART RATE: 66 BPM | RESPIRATION RATE: 18 BRPM | SYSTOLIC BLOOD PRESSURE: 140 MMHG | BODY MASS INDEX: 22.77 KG/M2 | WEIGHT: 192 LBS | DIASTOLIC BLOOD PRESSURE: 86 MMHG | OXYGEN SATURATION: 99 %

## 2019-04-16 PROCEDURE — 99215 OFFICE O/P EST HI 40 MIN: CPT | Mod: 25

## 2019-04-16 NOTE — VITALS
[Least Pain Intensity: 0/10] : 0/10 [80: Normal activity with effort; some signs or symptoms of disease.] : 80: Normal activity with effort; some signs or symptoms of disease.  [Date: ____________] : Patient's last distress assessment performed on [unfilled]. [1 - Distress Level] : Distress Level: 1 [Maximal Pain Intensity: 4/10] : 4/10

## 2019-04-16 NOTE — REASON FOR VISIT
[Consideration for Non-Curative Therapy] : consideration for non-curative therapy for [Other: ___] : [unfilled] [Other: _____] : [unfilled]

## 2019-04-18 NOTE — REVIEW OF SYSTEMS
[Patient Intake Form Reviewed] : Patient intake form was reviewed [Fatigue] : fatigue [SOB on Exertion] : shortness of breath during exertion [Negative] : Allergic/Immunologic [Cough] : no cough [Fever] : no fever [Chills] : no chills

## 2019-04-18 NOTE — PHYSICAL EXAM
[Normal] : oriented to person, place and time, the affect was normal, the mood was normal and not anxious [de-identified] : CN II- XII grossly intact. Sensation intact to LT and PP throughout.  Strength 5/5 BUE and BLE. Unable to illicit KJ reflex on the left, but MSR otherwise 2+.  Gait steady.

## 2019-04-18 NOTE — HISTORY OF PRESENT ILLNESS
[FreeTextEntry1] : 4/16/19- SNA\par Mr. Lozoya returns for SNA and further consideration of radiation therapy for spinal metastasis (h/o prostate cancer). When he was seen on 4/5/19, he reported improvement in pain after starting Firmagon. He opted to further consider his management options. He was advised that prior to any treatment, his old radiation records will be obtained and reviewed prior to radiation planning and delivery.  His treatment summary did detail a history of pelvic danis irradiation for which careful consideration of field matching will need to be performed.\par \par Today, he reports increased lower back pain over the past few days, rated 4/ 10. Using Oxycodone, which results in resolution of pain.He reports no other c/o or interval medical events. \par \par ______________________________________________\par ONCOLOGIC HISTORY/ INITIAL EVAL (4/5/19)\par Mr. Ran Lozoya is a 73M, former smoker (1 ppd x 40+ years, quit 2009), referred by Dr. 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  Of note, he has PMH notable for Prostate Adenocarcinoma.  As per outside records, he was diagnosed with [place] 4+4, pre-tx PSA of 72, clinical stage T3a or [id number], diagnosed in 2011.  He completed EBRT to prostate/ SV/ nodes for a dose of 81 Gy from 3/26/12- 5/29/12. He was treated by Dr. Alex Constantino [facility]/ [facility]. Also treated with ADT: 6 months of Lupron, but it was discontinued due to side effects of SOB.   \par \par He initially saw Dr. Gomez in June 2016 due to biochemical recurrence (PSA in June 2016 was 6.14). Dr. ARGUELLO continued to monitor his PSA, as well as CT and bone scan. CT on 12/30/16 noted metastatic disease to L4, as well as a nodule in RLL. In April 2017, he had EBUS-guided FNA, which showed metastatic prostatic adenocarcinoma. He was started on Bicalutamide in June 2017, which resulted in improvement of back pain. In June 2017, repeat CT CAP showed essentially stable disease with diffuse metastatic lymph nodes, lung nodule. and bone mets including a stable 2 cm sclerotic lesion on the L4 vertebral body. PSA decreased from 40 to eventually <1 on bicalutamide. When he saw Dr. Gomez on 12/17/18, PSA was noted to have increased from <1 to 2. Patient was also reporting increased lower back pain. Most recent PSA on 12/17/18 was 3.51. \par \par Most recent CT CAP on 1/22/19 showed the following:\par Marked decrease in the retroperitoneal adenopathy as compared to CT on 6/21/17. Progression of osteoblastic bony metastases to L3 and L4 vertebral bodies as compared to previous study: L3 involvement is new, L4 involvement is increased in size.  No other bony metastasis identified. \par \par Bone scan on 1/22/19 revealed the following:\par There has been an increase in the size of the abnormal activity in the L4 vertebral body, consistent with worsening of metastatic \par disease. There is also new abnormal activity within the L2, L3, and L5 vertebral bodies, consistent with metastatic disease. There is irregular activity in the thoracic spine which is likely due to degenerative change. Increased activity in proximal to mid left femur is again consistent with Paget's disease.\par \par He was advised to follow up with Oncology for possible treatment options.  He has been under the care of Dr. Jose G Garnett; has a follow up later today. He was started on Firmagon a month ago. He continues to take Bicalutamide. \par \par Of note, he saw his cardiologist Dr. Sullivan on 4/3/19, noted to have two recent episodes of syncope. Plan was to hold diuretic for one week, then decrease to once weekly. \par \par Today, he denies any pain to include back pain. Denies LE weakness, bowel or bladder dysfunction. On occasion, he experiences intermittent numbness in the plantar surface of both feet that resolves after he takes Torsemide. He reports SOB with moderate exertion since starting Firmagon. Also reports 30 pound weight loss since his open heart surgery in 2017, but weight has remained stable for some time. He also reports fatigue. Denies fever, chills, CP, N/V/D, or any other c/o. \par \par Of note, he has PMH significant for CHF, cardiomyopathy, 1st degree AV block, aortic aneurysm repair, A-fib s/p ablation, pre- DM; followed by Cardiologist Dr. Sullivan and PCP Dr. Santiago. \par \par Patient denies any known family history of cancer.

## 2019-04-18 NOTE — DISEASE MANAGEMENT
[Clinical] : TNM Stage: c [N/A] : Currently not applicable [FreeTextEntry4] : Metastatic [TTNM] : . [MTNM] : . [NTNM] : .

## 2019-04-29 ENCOUNTER — APPOINTMENT (OUTPATIENT)
Dept: INTERNAL MEDICINE | Facility: CLINIC | Age: 74
End: 2019-04-29

## 2019-05-15 NOTE — HISTORY OF PRESENT ILLNESS
[FreeTextEntry1] : 5/15/19- OTV- Mr. Lozoya has completed 800 cGy/ 2000 cGy to L1- L4.  Today, he feels well, notes worsening of lumbar pain, now taking 3 oxycodone per day, increased for past week.  Scant nausea after treatment with vomiting, eating normally and declines antiemetics.  one extra bowel movement a day.  Otherwise well. \par **Needs PTE**\par \par \par 4/16/19- SNA\par Mr. Lozoya returns for SNA and further consideration of radiation therapy for spinal metastasis (h/o prostate cancer). When he was seen on 4/5/19, he reported improvement in pain after starting Firmagon. He opted to further consider his management options. He was advised that prior to any treatment, his old radiation records will be obtained and reviewed prior to radiation planning and delivery.  His treatment summary did detail a history of pelvic danis irradiation for which careful consideration of field matching will need to be performed.\par \par Today, he reports increased lower back pain over the past few days, rated 4/ 10. Using Oxycodone, which results in resolution of pain. He reports no other c/o or interval medical events. \par \par ______________________________________________\par ONCOLOGIC HISTORY/ INITIAL EVAL (4/5/19)\par Mr. Ran Lozoya is a 73M, former smoker (1 ppd x 40+ years, quit 2009), referred by Dr. 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  Of note, he has PMH notable for Prostate Adenocarcinoma.  As per outside records, he was diagnosed with [place] 4+4, pre-tx PSA of 72, clinical stage T3a or [id number], diagnosed in 2011.  He completed EBRT to prostate/ SV/ nodes for a dose of 81 Gy from 3/26/12- 5/29/12. He was treated by Dr. Alex Constantino [facility]. Also treated with ADT: 6 months of Lupron, but it was discontinued due to side effects of SOB.   \par \par He initially saw Dr. Gomez in June 2016 due to biochemical recurrence (PSA in June 2016 was 6.14). Dr. ARGUELLO continued to monitor his PSA, as well as CT and bone scan. CT on 12/30/16 noted metastatic disease to L4, as well as a nodule in RLL. In April 2017, he had EBUS-guided FNA, which showed metastatic prostatic adenocarcinoma. He was started on Bicalutamide in June 2017, which resulted in improvement of back pain. In June 2017, repeat CT CAP showed essentially stable disease with diffuse metastatic lymph nodes, lung nodule. and bone mets including a stable 2 cm sclerotic lesion on the L4 vertebral body. PSA decreased from 40 to eventually <1 on bicalutamide. When he saw Dr. Gomez on 12/17/18, PSA was noted to have increased from <1 to 2. Patient was also reporting increased lower back pain. Most recent PSA on 12/17/18 was 3.51. \par \par Most recent CT CAP on 1/22/19 showed the following:\par Marked decrease in the retroperitoneal adenopathy as compared to CT on 6/21/17. Progression of osteoblastic bony metastases to L3 and L4 vertebral bodies as compared to previous study: L3 involvement is new, L4 involvement is increased in size.  No other bony metastasis identified. \par \par Bone scan on 1/22/19 revealed the following:\par There has been an increase in the size of the abnormal activity in the L4 vertebral body, consistent with worsening of metastatic \par disease. There is also new abnormal activity within the L2, L3, and L5 vertebral bodies, consistent with metastatic disease. There is irregular activity in the thoracic spine which is likely due to degenerative change. Increased activity in proximal to mid left femur is again consistent with Paget's disease.\par \par He was advised to follow up with Oncology for possible treatment options.  He has been under the care of Dr. Jose G Garnett; has a follow up later today. He was started on Firmagon a month ago. He continues to take Bicalutamide. \par \par Of note, he saw his cardiologist Dr. Sullivan on 4/3/19, noted to have two recent episodes of syncope. Plan was to hold diuretic for one week, then decrease to once weekly. \par \par Today, he denies any pain to include back pain. Denies LE weakness, bowel or bladder dysfunction. On occasion, he experiences intermittent numbness in the plantar surface of both feet that resolves after he takes Torsemide. He reports SOB with moderate exertion since starting Firmagon. Also reports 30 pound weight loss since his open heart surgery in 2017, but weight has remained stable for some time. He also reports fatigue. Denies fever, chills, CP, N/V/D, or any other c/o. \par \par Of note, he has PMH significant for CHF, cardiomyopathy, 1st degree AV block, aortic aneurysm repair, A-fib s/p ablation, pre- DM; followed by Cardiologist Dr. Sullivan and PCP Dr. Santiago. \par \par Patient denies any known family history of cancer.

## 2019-05-15 NOTE — VITALS
[Maximal Pain Intensity: 4/10] : 4/10 [Least Pain Intensity: 0/10] : 0/10 [80: Normal activity with effort; some signs or symptoms of disease.] : 80: Normal activity with effort; some signs or symptoms of disease.

## 2019-05-15 NOTE — PHYSICAL EXAM
[Normal] : oriented to person, place and time, the affect was normal, the mood was normal and not anxious [de-identified] : CN II- XII grossly intact. Sensation intact to LT and PP throughout.  Strength 5/5 BUE and BLE. Unable to illicit KJ reflex on the left, but MSR otherwise 2+.  Gait steady.

## 2019-05-15 NOTE — SOCIAL HISTORY
[Former  Cigarette ___ Pack Year(s)] : former pack year(s) of cigarette use: [unfilled] [Former  Cigarette ___ Pack(s) per day] : former cigarette pack(s) per day:  [unfilled]  [Date (Year) Stopped: _____] : Date (Year) Stopped: [unfilled]

## 2019-05-15 NOTE — REVIEW OF SYSTEMS
[Patient Intake Form Reviewed] : Patient intake form was reviewed [Fatigue] : fatigue [SOB on Exertion] : shortness of breath during exertion [Negative] : Allergic/Immunologic [Fever] : no fever [Cough] : no cough [Chills] : no chills

## 2019-05-23 ENCOUNTER — NON-APPOINTMENT (OUTPATIENT)
Age: 74
End: 2019-05-23

## 2019-05-23 ENCOUNTER — APPOINTMENT (OUTPATIENT)
Dept: HEART AND VASCULAR | Facility: CLINIC | Age: 74
End: 2019-05-23
Payer: MEDICARE

## 2019-05-23 VITALS
WEIGHT: 189 LBS | HEIGHT: 77 IN | BODY MASS INDEX: 22.32 KG/M2 | SYSTOLIC BLOOD PRESSURE: 135 MMHG | DIASTOLIC BLOOD PRESSURE: 68 MMHG | HEART RATE: 53 BPM

## 2019-05-23 PROCEDURE — 93000 ELECTROCARDIOGRAM COMPLETE: CPT

## 2019-05-23 PROCEDURE — 99205 OFFICE O/P NEW HI 60 MIN: CPT | Mod: 25

## 2019-05-29 NOTE — PHYSICAL EXAM
[General Appearance - Well Developed] : well developed [Normal Appearance] : normal appearance [Well Groomed] : well groomed [General Appearance - Well Nourished] : well nourished [General Appearance - In No Acute Distress] : no acute distress [Normal Conjunctiva] : the conjunctiva exhibited no abnormalities [No Deformities] : no deformities [Normal Oral Mucosa] : normal oral mucosa [Normal Jugular Venous V Waves Present] : normal jugular venous V waves present [5th Left ICS - MCL] : palpated at the 5th LICS in the midclavicular line [Normal] : normal [No Precordial Heave] : no precordial heave was noted [Normal Rate] : normal [Regularly Irregular] : regularly irregular [Respiration, Rhythm And Depth] : normal respiratory rhythm and effort [] : no respiratory distress [Exaggerated Use Of Accessory Muscles For Inspiration] : no accessory muscle use [Auscultation Breath Sounds / Voice Sounds] : lungs were clear to auscultation bilaterally [Abnormal Walk] : normal gait [Cyanosis, Localized] : no localized cyanosis [Skin Color & Pigmentation] : normal skin color and pigmentation [Oriented To Time, Place, And Person] : oriented to person, place, and time [Impaired Insight] : insight and judgment were intact [No Anxiety] : not feeling anxious

## 2019-06-07 NOTE — DISCUSSION/SUMMARY
[FreeTextEntry1] : 73 year old male with PVCs s/p unsuccessful ablation at OSH, history of AAA s/p repair 2017, HTN, cardiomyopathy and metastatic prostate cancer, who presents for initial investigation.  We discussed the natural conduction system of the heart and what a PVC is.  We discussed indications for treatment of PVCs such as high burden, symptoms or a cardiomyopathy.  I believe his cardiomyopathy is at least partially secondary to these frequent monomorphic PVCs.  He reports an attempted ablation  at some point at [facility].  He has these PVCs despite being on metoprolol.  He is maintained on Entresto and beta blocker for heart failure optimization.  He was also offered an ICD for primary prevention.  I believe he would benefit from an EP study with ablation of his monomorphic PVCs.  I would like to get a better understanding of his prognosis from an oncology standpoint; but overall I am hopeful this will improve his EF.  HE would like to think about this more and I will discuss it with his cardiologist.  He will follow up in 1 month or sooner if needed.  He knows to call with any questions or concerns.

## 2019-06-07 NOTE — REVIEW OF SYSTEMS
[see HPI] : see HPI [Negative] : Heme/Lymph [Fever] : no fever [Feeling Fatigued] : not feeling fatigued [Chills] : no chills

## 2019-06-07 NOTE — HISTORY OF PRESENT ILLNESS
[FreeTextEntry1] : 73 year old male with PVCs s/p unsuccessful ablation at OSH, history of AAA s/p repair 2017, HTN, cardiomyopathy and metastatic prostate cancer, who presents for initial investigation.  \par \par As per notes he has had an EF as low as 30% and as per note from [facility] he was recommended to have an ICD but deferred this. Most recent echo with EF 40%.  He states that he had prostate cancer and was in remission but it came back and has metastasis to the spine.  He believes his cardiomyopathy was secondary to the prior medication he was on (lupron) and was told by his oncologist this was unlikely.  He states he had one episode  of syncope right before his open heart surgery.  He states that recently he was feeling lightheaded and had an episode where he tripped.  He states he stopped taking his torsemide (now only takes PRN).  Since then no further lightheadedness and he "feels great".  No chest pain, palpitations, orthopnea, PND, peripheral edema. \par   \par

## 2019-06-14 ENCOUNTER — INPATIENT (INPATIENT)
Facility: HOSPITAL | Age: 74
LOS: 0 days | Discharge: HOME | DRG: 274 | End: 2019-06-15
Attending: INTERNAL MEDICINE | Admitting: INTERNAL MEDICINE
Payer: MEDICARE

## 2019-06-14 VITALS — HEIGHT: 74 IN | WEIGHT: 190.04 LBS

## 2019-06-14 DIAGNOSIS — I49.3 VENTRICULAR PREMATURE DEPOLARIZATION: ICD-10-CM

## 2019-06-14 DIAGNOSIS — S42.009A FRACTURE OF UNSPECIFIED PART OF UNSPECIFIED CLAVICLE, INITIAL ENCOUNTER FOR CLOSED FRACTURE: Chronic | ICD-10-CM

## 2019-06-14 LAB
BLD GP AB SCN SERPL QL: NEGATIVE — SIGNIFICANT CHANGE UP
GLUCOSE BLDC GLUCOMTR-MCNC: 146 MG/DL — HIGH (ref 70–99)
RH IG SCN BLD-IMP: POSITIVE — SIGNIFICANT CHANGE UP

## 2019-06-14 PROCEDURE — 99235 HOSP IP/OBS SAME DATE MOD 70: CPT

## 2019-06-14 PROCEDURE — 93654 COMPRE EP EVAL TX VT: CPT

## 2019-06-14 RX ORDER — BICALUTAMIDE 50 MG/1
50 TABLET, FILM COATED ORAL DAILY
Refills: 0 | Status: DISCONTINUED | OUTPATIENT
Start: 2019-06-14 | End: 2019-06-14

## 2019-06-14 RX ORDER — TAMSULOSIN HYDROCHLORIDE 0.4 MG/1
0.4 CAPSULE ORAL ONCE
Refills: 0 | Status: COMPLETED | OUTPATIENT
Start: 2019-06-14 | End: 2019-06-14

## 2019-06-14 RX ORDER — METOPROLOL TARTRATE 50 MG
25 TABLET ORAL DAILY
Refills: 0 | Status: DISCONTINUED | OUTPATIENT
Start: 2019-06-14 | End: 2019-06-15

## 2019-06-14 RX ORDER — PREGABALIN 225 MG/1
1 CAPSULE ORAL
Qty: 0 | Refills: 0 | DISCHARGE

## 2019-06-14 RX ORDER — BICALUTAMIDE 50 MG/1
50 TABLET, FILM COATED ORAL DAILY
Refills: 0 | Status: DISCONTINUED | OUTPATIENT
Start: 2019-06-14 | End: 2019-06-15

## 2019-06-14 RX ORDER — ACETAMINOPHEN 500 MG
650 TABLET ORAL ONCE
Refills: 0 | Status: COMPLETED | OUTPATIENT
Start: 2019-06-14 | End: 2019-06-14

## 2019-06-14 RX ORDER — ASPIRIN/CALCIUM CARB/MAGNESIUM 324 MG
81 TABLET ORAL DAILY
Refills: 0 | Status: DISCONTINUED | OUTPATIENT
Start: 2019-06-14 | End: 2019-06-14

## 2019-06-14 RX ORDER — VITAMIN E 100 UNIT
1 CAPSULE ORAL
Qty: 0 | Refills: 0 | DISCHARGE

## 2019-06-14 RX ORDER — ERGOCALCIFEROL 1.25 MG/1
1 CAPSULE ORAL
Qty: 0 | Refills: 0 | DISCHARGE

## 2019-06-14 RX ORDER — ASPIRIN/CALCIUM CARB/MAGNESIUM 324 MG
81 TABLET ORAL DAILY
Refills: 0 | Status: DISCONTINUED | OUTPATIENT
Start: 2019-06-15 | End: 2019-06-15

## 2019-06-14 RX ORDER — PYRIDOXINE HCL (VITAMIN B6) 100 MG
1 TABLET ORAL
Qty: 0 | Refills: 0 | DISCHARGE

## 2019-06-14 RX ORDER — SACUBITRIL AND VALSARTAN 24; 26 MG/1; MG/1
1 TABLET, FILM COATED ORAL
Qty: 0 | Refills: 0 | DISCHARGE

## 2019-06-14 RX ORDER — ASPIRIN/CALCIUM CARB/MAGNESIUM 324 MG
325 TABLET ORAL ONCE
Refills: 0 | Status: COMPLETED | OUTPATIENT
Start: 2019-06-14 | End: 2019-06-14

## 2019-06-14 RX ORDER — OMEGA-3 ACID ETHYL ESTERS 1 G
1 CAPSULE ORAL
Qty: 0 | Refills: 0 | DISCHARGE

## 2019-06-14 RX ORDER — SACUBITRIL AND VALSARTAN 24; 26 MG/1; MG/1
1 TABLET, FILM COATED ORAL
Refills: 0 | Status: DISCONTINUED | OUTPATIENT
Start: 2019-06-14 | End: 2019-06-15

## 2019-06-14 RX ORDER — CHOLECALCIFEROL (VITAMIN D3) 125 MCG
1 CAPSULE ORAL
Qty: 0 | Refills: 0 | DISCHARGE

## 2019-06-14 RX ADMIN — Medication 650 MILLIGRAM(S): at 22:19

## 2019-06-14 RX ADMIN — BICALUTAMIDE 50 MILLIGRAM(S): 50 TABLET, FILM COATED ORAL at 21:33

## 2019-06-14 RX ADMIN — TAMSULOSIN HYDROCHLORIDE 0.4 MILLIGRAM(S): 0.4 CAPSULE ORAL at 21:32

## 2019-06-14 RX ADMIN — Medication 650 MILLIGRAM(S): at 21:19

## 2019-06-14 NOTE — H&P ADULT - PROBLEM SELECTOR PLAN 1
Patient with history of PVC and prior ablation at OSH presents for scheduled EPS and PVC ablation.  monitor on telemetry overnight, bed rest 6 hrs  monitor groin  continue home meds.

## 2019-06-14 NOTE — PROGRESS NOTE ADULT - SUBJECTIVE AND OBJECTIVE BOX
Brief EP Procedure Note    Procedure: EPS with 3D Mapping and ablation of a PVC focus    Operators:   1. Kun Ambrosio MD  2. Favio Meeks MD    Indication / Diagnosis: Symptomatic high burden PVC > 18% contributing to pt's systolic dysfunction    Narrative:  After obtaining informed consent,  ANAYELI ZHONG was brought to the EP lab in a fasting state and moderate sedation was induced by the anesthesia team. Bilateral groins were steriely prepped and draped and femoral venous access was obtained with seldinger's technique. The following sheaths and catheters were inserted.    RFV: 8Fr (Ablation catheter), 7Fr (CRD > RVApex)    Catheters were advanced to the heart under floroscopy and key cardiac structures were mapped with the 3D mapping system.    Baseline rhythm: NSR with frequent PVCs (LBB, superior axis, negative in precordial lead v1-v6, positive in I).    Findings:  Activation mapping in the RV and MCV showed the earliest signal in the basal inferior RV free wall. Ablation was performed in this region and there were no more PVCs post ablation.    sheaths and catheters were removed.     hemostasis was achieved with manual pressure. There were no complications    Conclusion:  Successful ablation of a basal inferior RV free wall PVC focus    Plan:  4 hours flat time  monitor overnight  no plan for ICD implantation      Favio Meeks MD  EP Fellow - [id number]

## 2019-06-14 NOTE — H&P ADULT - HISTORY OF PRESENT ILLNESS
72 y/o M, with history of unsuccessful  PVCs ablation at OSH,   systolic CHF, EF 40%, MI 2016 treated medically, history of AAA repair,    recurrent prostate CA with mets to the vertebrae and lungs, s/p ADT and EBRT presents for  EPS and repeat  PVC ablation.     As per notes he has had an EF as low as 30% and as per note from [facility] he was recommended to have an ICD but deferred this. Most recent echo with EF 40%.  He states that he had prostate cancer and was in remission but it came back and has metastasis to the spine.  He believes his cardiomyopathy was secondary to the prior medication he was on (lupron) and was told by his oncologist this was unlikely.  He states he had one episode  of syncope right before his open heart surgery.  He states that recently he was feeling lightheaded and had an episode where he tripped.  He states he stopped taking his torsemide (now only takes PRN).  Since then no further lightheadedness and he "feels great".  No chest pain, palpitations, orthopnea, PND, peripheral edema.

## 2019-06-14 NOTE — H&P ADULT - ASSESSMENT
72 y/o M, with history of unsuccessful  PVCs ablation at OSH,   systolic CHF, EF 40%, MI 2016 treated medically, history of AAA repair,    recurrent prostate CA with mets to the vertebrae and lungs, s/p ADT and EBRT presents for  EPS and repeat  PVC ablation.

## 2019-06-14 NOTE — H&P ADULT - NSICDXFAMILYHX_GEN_ALL_CORE_FT
FAMILY HISTORY:  Mother  Still living? Unknown  Family history of diabetes mellitus, Age at diagnosis: Age Unknown    Sibling  Still living? Unknown  Family history of cancer in sister, Age at diagnosis: Age Unknown

## 2019-06-14 NOTE — H&P ADULT - NSICDXPASTMEDICALHX_GEN_ALL_CORE_FT
PAST MEDICAL HISTORY:  Benign prostatic hyperplasia, presence of lower urinary tract symptoms unspecified, unspecified morphology     Bronchitis     Congestive heart failure, unspecified congestive heart failure chronicity, unspecified congestive heart failure type     Metastasis     Paroxysmal atrial fibrillation     Prostate cancer     Shortness of breath

## 2019-06-14 NOTE — CHART NOTE - NSCHARTNOTEFT_GEN_A_CORE
EPS PA event note:    Called by Em RAE to inform us that when pt arrived to the floor from EPS holding, pt c/o cold and was shivering. VS at that time showed  and HR in the 60s (NSR w/o PVC).  Pt denies any pain or SOB or dizziness.  Physical exam shows intact neuro (moves all extremities and no facial asymmetry). Right groin wound w/o hematoma or tenderness.  Warm blanket and warm compress given.  . Pt denies ETOH abuse (just drinks socially).  Pt feels better after a few minutes later.  Left message for attending.  [facility] and [facility] shift.

## 2019-06-15 ENCOUNTER — TRANSCRIPTION ENCOUNTER (OUTPATIENT)
Age: 74
End: 2019-06-15

## 2019-06-15 VITALS — TEMPERATURE: 98 F

## 2019-06-15 PROCEDURE — 99239 HOSP IP/OBS DSCHRG MGMT >30: CPT

## 2019-06-15 RX ORDER — ACETAMINOPHEN 500 MG
650 TABLET ORAL ONCE
Refills: 0 | Status: COMPLETED | OUTPATIENT
Start: 2019-06-15 | End: 2019-06-15

## 2019-06-15 RX ORDER — METOPROLOL TARTRATE 50 MG
1 TABLET ORAL
Qty: 180 | Refills: 0
Start: 2019-06-15 | End: 2019-09-12

## 2019-06-15 RX ORDER — METOPROLOL TARTRATE 50 MG
1 TABLET ORAL
Qty: 0 | Refills: 0 | DISCHARGE

## 2019-06-15 RX ADMIN — SACUBITRIL AND VALSARTAN 1 TABLET(S): 24; 26 TABLET, FILM COATED ORAL at 06:08

## 2019-06-15 RX ADMIN — Medication 650 MILLIGRAM(S): at 00:15

## 2019-06-15 RX ADMIN — Medication 650 MILLIGRAM(S): at 01:15

## 2019-06-15 RX ADMIN — Medication 25 MILLIGRAM(S): at 09:01

## 2019-06-15 NOTE — DISCHARGE NOTE PROVIDER - CARE PROVIDER_API CALL
Kun Ambrosio)  Cardiac Electrophysiology; Cardiology; Cardiovascular Disease  [address], [address] [place] [address]  Phone: [phone]  Fax: [phone]  Follow Up Time:

## 2019-06-15 NOTE — DISCHARGE NOTE PROVIDER - NSDCCPCAREPLAN_GEN_ALL_CORE_FT
PRINCIPAL DISCHARGE DIAGNOSIS  Diagnosis: PVC (premature ventricular contraction)  Assessment and Plan of Treatment: You were admitted to the hospital for an ablation. We have increased your dose of metroprolol XL to 25mg by mouth twice dialy. Please follow up with Dr. Ambrosio in weeks. Should you experience any palpitations or chest pain please go to the emergnecy room for evaluation.

## 2019-06-15 NOTE — CHART NOTE - NSCHARTNOTEFT_GEN_A_CORE
Please see discharge note for details.   Feels well, no palpitation, CP, SOB, goring pain    /65. HR 72  No JVD, no bruit,   RRR, S1S!  Lungs clear, groin no hematoma  Abd bening    Tele no PVCs overnight, however this am rare PVCs some in bigem.   Toprol was discontinued     Plan:   Restart Toprol 25mg po bid  Not inducible for VT therefore no indications for ICD  COntinue other CHF meds for cardiomyopathy  See me in clinic in 2 weeks.

## 2019-06-15 NOTE — DISCHARGE NOTE NURSING/CASE MANAGEMENT/SOCIAL WORK - NSDCDPATPORTLINK_GEN_ALL_CORE
You can access the [facility] Portal, offered by [facility], by registering with the following website: [email or web address]

## 2019-06-15 NOTE — DISCHARGE NOTE PROVIDER - HOSPITAL COURSE
72 y/o M, with history of unsuccessful  PVCs ablation at OSH, systolic CHF, EF 40%, MI 2016 treated medically, history of AAA repair,recurrent prostate CA with mets to the vertebrae and lungs, s/p ADT and EBRT presented for EPS and repeat PVC ablation. Indication for procedure was Symptomatic high burden PVC > 18% contributing to pt's systolic dysfunction. Pt underwent EPS with 3D Mapping and ablation of a PVC focus with Dr. Ambrosio and Dr. Meeks. Activation mapping in the RV and MCV showed the earliest signal in the basal inferior RV free wall. Ablation was performed in this region and there were no more PVCs post ablation. There were no procedural complications. Conclusion of procedure is successful ablation of a basal inferior RV free wall PVC focus. The patient will follow up with Dr. Ambrosio in 2 weeks.

## 2019-06-24 DIAGNOSIS — I49.3 VENTRICULAR PREMATURE DEPOLARIZATION: ICD-10-CM

## 2019-06-24 DIAGNOSIS — I48.0 PAROXYSMAL ATRIAL FIBRILLATION: ICD-10-CM

## 2019-06-24 DIAGNOSIS — I42.9 CARDIOMYOPATHY, UNSPECIFIED: ICD-10-CM

## 2019-06-24 DIAGNOSIS — I11.0 HYPERTENSIVE HEART DISEASE WITH HEART FAILURE: ICD-10-CM

## 2019-06-24 DIAGNOSIS — C79.51 SECONDARY MALIGNANT NEOPLASM OF BONE: ICD-10-CM

## 2019-06-24 DIAGNOSIS — Z79.82 LONG TERM (CURRENT) USE OF ASPIRIN: ICD-10-CM

## 2019-06-24 DIAGNOSIS — I47.2 VENTRICULAR TACHYCARDIA: ICD-10-CM

## 2019-06-24 DIAGNOSIS — C78.00 SECONDARY MALIGNANT NEOPLASM OF UNSPECIFIED LUNG: ICD-10-CM

## 2019-06-24 DIAGNOSIS — I25.10 ATHEROSCLEROTIC HEART DISEASE OF NATIVE CORONARY ARTERY WITHOUT ANGINA PECTORIS: ICD-10-CM

## 2019-06-24 DIAGNOSIS — I50.20 UNSPECIFIED SYSTOLIC (CONGESTIVE) HEART FAILURE: ICD-10-CM

## 2019-06-24 DIAGNOSIS — N40.1 BENIGN PROSTATIC HYPERPLASIA WITH LOWER URINARY TRACT SYMPTOMS: ICD-10-CM

## 2019-06-24 DIAGNOSIS — C61 MALIGNANT NEOPLASM OF PROSTATE: ICD-10-CM

## 2019-06-26 ENCOUNTER — APPOINTMENT (OUTPATIENT)
Dept: INTERNAL MEDICINE | Facility: CLINIC | Age: 74
End: 2019-06-26
Payer: MEDICARE

## 2019-06-26 VITALS
HEART RATE: 42 BPM | BODY MASS INDEX: 22.32 KG/M2 | TEMPERATURE: 98.4 F | OXYGEN SATURATION: 98 % | HEIGHT: 77 IN | SYSTOLIC BLOOD PRESSURE: 119 MMHG | DIASTOLIC BLOOD PRESSURE: 83 MMHG | WEIGHT: 189 LBS

## 2019-06-26 DIAGNOSIS — R00.1 BRADYCARDIA, UNSPECIFIED: ICD-10-CM

## 2019-06-26 PROCEDURE — 82962 GLUCOSE BLOOD TEST: CPT

## 2019-06-26 PROCEDURE — C1730: CPT

## 2019-06-26 PROCEDURE — 86901 BLOOD TYPING SEROLOGIC RH(D): CPT

## 2019-06-26 PROCEDURE — C1894: CPT

## 2019-06-26 PROCEDURE — C1766: CPT

## 2019-06-26 PROCEDURE — C1732: CPT

## 2019-06-26 PROCEDURE — 93000 ELECTROCARDIOGRAM COMPLETE: CPT

## 2019-06-26 PROCEDURE — 99214 OFFICE O/P EST MOD 30 MIN: CPT | Mod: 25

## 2019-06-26 PROCEDURE — 86850 RBC ANTIBODY SCREEN: CPT

## 2019-06-26 PROCEDURE — 36415 COLL VENOUS BLD VENIPUNCTURE: CPT

## 2019-06-26 PROCEDURE — 86900 BLOOD TYPING SEROLOGIC ABO: CPT

## 2019-06-27 ENCOUNTER — OTHER (OUTPATIENT)
Age: 74
End: 2019-06-27

## 2019-06-27 LAB
ALBUMIN SERPL ELPH-MCNC: 4.6 G/DL
ALP BLD-CCNC: 112 U/L
ALT SERPL-CCNC: 37 U/L
ANION GAP SERPL CALC-SCNC: 14 MMOL/L
AST SERPL-CCNC: 48 U/L
BASOPHILS # BLD AUTO: 0.03 K/UL
BASOPHILS NFR BLD AUTO: 0.5 %
BILIRUB SERPL-MCNC: 0.6 MG/DL
BUN SERPL-MCNC: 21 MG/DL
CALCIUM SERPL-MCNC: 10.2 MG/DL
CHLORIDE SERPL-SCNC: 102 MMOL/L
CO2 SERPL-SCNC: 26 MMOL/L
CREAT SERPL-MCNC: 1.17 MG/DL
EOSINOPHIL # BLD AUTO: 0.11 K/UL
EOSINOPHIL NFR BLD AUTO: 1.9 %
GLUCOSE SERPL-MCNC: 95 MG/DL
HCT VFR BLD CALC: 41.5 %
HGB BLD-MCNC: 13.1 G/DL
IMM GRANULOCYTES NFR BLD AUTO: 0.3 %
LYMPHOCYTES # BLD AUTO: 0.61 K/UL
LYMPHOCYTES NFR BLD AUTO: 10.5 %
MAN DIFF?: NORMAL
MCHC RBC-ENTMCNC: 31.6 GM/DL
MCHC RBC-ENTMCNC: 32.3 PG
MCV RBC AUTO: 102.5 FL
MONOCYTES # BLD AUTO: 0.46 K/UL
MONOCYTES NFR BLD AUTO: 7.9 %
NEUTROPHILS # BLD AUTO: 4.58 K/UL
NEUTROPHILS NFR BLD AUTO: 78.9 %
PLATELET # BLD AUTO: 209 K/UL
POTASSIUM SERPL-SCNC: 4.9 MMOL/L
PROT SERPL-MCNC: 7.8 G/DL
RBC # BLD: 4.05 M/UL
RBC # FLD: 13.2 %
SODIUM SERPL-SCNC: 142 MMOL/L
TSH SERPL-ACNC: 3.92 UIU/ML
WBC # FLD AUTO: 5.81 K/UL

## 2019-06-27 NOTE — SOCIAL HISTORY
No [Former  Cigarette ___ Pack(s) per day] : former cigarette pack(s) per day:  [unfilled]  [Former  Cigarette ___ Pack Year(s)] : former pack year(s) of cigarette use: [unfilled] [Date (Year) Stopped: _____] : Date (Year) Stopped: [unfilled]

## 2019-07-03 ENCOUNTER — APPOINTMENT (OUTPATIENT)
Dept: RADIATION ONCOLOGY | Facility: CLINIC | Age: 74
End: 2019-07-03
Payer: MEDICARE

## 2019-07-03 VITALS
RESPIRATION RATE: 18 BRPM | HEART RATE: 67 BPM | BODY MASS INDEX: 22.53 KG/M2 | SYSTOLIC BLOOD PRESSURE: 112 MMHG | OXYGEN SATURATION: 98 % | DIASTOLIC BLOOD PRESSURE: 67 MMHG | WEIGHT: 190 LBS

## 2019-07-03 PROCEDURE — 99024 POSTOP FOLLOW-UP VISIT: CPT

## 2019-07-05 NOTE — DISEASE MANAGEMENT
[Clinical] : TNM Stage: c [N/A] : Currently not applicable [FreeTextEntry4] : Metastatic [NTNM] : . [TTNM] : . [MTNM] : . [de-identified] : L1- L4

## 2019-07-05 NOTE — PHYSICAL EXAM
[Normal] : oriented to person, place and time, the affect was normal, the mood was normal and not anxious [] : no respiratory distress [Respiration, Rhythm And Depth] : normal respiratory rhythm and effort [Exaggerated Use Of Accessory Muscles For Inspiration] : no accessory muscle use [Heart Sounds] : normal S1 and S2 [de-identified] : No erythema or hyperpigmentation noted. [de-identified] : CN II- XII grossly intact. Sensation intact to LT and PP throughout.  Strength 5/5 BUE and BLE. KJ 1+ bilaterally, MSR otherwise 2+.  Gait steady.

## 2019-07-05 NOTE — REVIEW OF SYSTEMS
[Patient Intake Form Reviewed] : Patient intake form was reviewed [Fatigue] : fatigue [SOB on Exertion] : shortness of breath during exertion [Negative] : Allergic/Immunologic [Dermatitis Radiation: Grade 0] : Dermatitis Radiation: Grade 0 [Skin Hyperpigmentation: Grade 0] : Skin Hyperpigmentation: Grade 0 [Fever] : no fever [Cough] : no cough [Chills] : no chills

## 2019-07-05 NOTE — HISTORY OF PRESENT ILLNESS
[FreeTextEntry1] : Mr. Ran Lozoya has completed radiation therapy to L1- L4 for a total of 20 Gy over 5 fractions from 5/14/19 to 5/20/19 for metastatic prostate cancer with symptomatic bone metastases in the lumbar spine. He tolerated his radiation well and did not develop any grade 3 or higher acute toxicities as a result of treatment. He did experience a pain flare for which he declined a steroid taper or stronger analgesics. \par \par 7/3/19- PTE\par Mr. Lozoya returns for post treatment evaluation. Since completing RT, he followed up with cardiologist Dr. Ambrosio at which time he was offered an ICD; they also discussed EP study with ablation of his PVCs. Patient reports he had the EP study and ablation on. 6/17. He followed up with his PCP Dr. Santiago on 6/26. He has not followed up recently with his Medical Oncologist Dr. Yeo, but states he will call to schedule an appointment. He also notes that he is considering establishing care at [facility].  Today, he reports back pain recently started again, but it is a different type of pain from before. He describes pain as located in lower back, sore, rated 3/ 10, intermittent, not taking anything for the pain. He states pain does not radiate. He denies bowel or bladder dysfunction, numbness, tingling, weakness. He does report SOB with exertion. He also reports that he fell last night. He was bending down for several minutes, and when he stood up, he was dizzy and fell. His finger was punctured by a metal object when he fell. He has not followed up with his PCP for this. He thinks his last tetanus shot was over 10 years ago. \par \par \par 4/16/19- SNA\par Mr. Lozoya returns for SNA and further consideration of radiation therapy for spinal metastasis (h/o prostate cancer). When he was seen on 4/5/19, he reported improvement in pain after starting Firmagon. He opted to further consider his management options. He was advised that prior to any treatment, his old radiation records will be obtained and reviewed prior to radiation planning and delivery.  His treatment summary did detail a history of pelvic danis irradiation for which careful consideration of field matching will need to be performed.\par \par Today, he reports increased lower back pain over the past few days, rated 4/ 10. Using Oxycodone, which results in resolution of pain.He reports no other c/o or interval medical events. \par \par ______________________________________________\par ONCOLOGIC HISTORY/ INITIAL EVAL (4/5/19)\par Mr. Ran Lozoya is a 73M, former smoker (1 ppd x 40+ years, quit 2009), referred by Dr. Gomez for consideration of radiation therapy for spinal metastasis.  Of note, he has PMH notable for Prostate Adenocarcinoma.  As per outside records, he was diagnosed with [place] 4+4, pre-tx PSA of 72, clinical stage T3a or [id number], diagnosed in 2011.  He completed EBRT to prostate/ SV/ nodes for a dose of 81 Gy from 3/26/12- 5/29/12. He was treated by Dr. Alex Constantino [facility]/ [facility]. Also treated with ADT: 6 months of Lupron, but it was discontinued due to side effects of SOB.   \par \par He initially saw Dr. Gomez in June 2016 due to biochemical recurrence (PSA in June 2016 was 6.14). Dr. ARGUELLO continued to monitor his PSA, as well as CT and bone scan. CT on 12/30/16 noted metastatic disease to L4, as well as a nodule in RLL. In April 2017, he had EBUS-guided FNA, which showed metastatic prostatic adenocarcinoma. He was started on Bicalutamide in June 2017, which resulted in improvement of back pain. In June 2017, repeat CT CAP showed essentially stable disease with diffuse metastatic lymph nodes, lung nodule. and bone mets including a stable 2 cm sclerotic lesion on the L4 vertebral body. PSA decreased from 40 to eventually <1 on bicalutamide. When he saw Dr. Gomez on 12/17/18, PSA was noted to have increased from <1 to 2. Patient was also reporting increased lower back pain. Most recent PSA on 12/17/18 was 3.51. \par \par Most recent CT CAP on 1/22/19 showed the following:\par Marked decrease in the retroperitoneal adenopathy as compared to CT on 6/21/17. Progression of osteoblastic bony metastases to L3 and L4 vertebral bodies as compared to previous study: L3 involvement is new, L4 involvement is increased in size.  No other bony metastasis identified. \par \par Bone scan on 1/22/19 revealed the following:\par There has been an increase in the size of the abnormal activity in the L4 vertebral body, consistent with worsening of metastatic \par disease. There is also new abnormal activity within the L2, L3, and L5 vertebral bodies, consistent with metastatic disease. There is irregular activity in the thoracic spine which is likely due to degenerative change. Increased activity in proximal to mid left femur is again consistent with Paget's disease.\par \par He was advised to follow up with Oncology for possible treatment options.  He has been under the care of Dr. Jose G Garnett; has a follow up later today. He was started on Firmagon a month ago. He continues to take Bicalutamide. \par \par Of note, he saw his cardiologist Dr. Sullivan on 4/3/19, noted to have two recent episodes of syncope. Plan was to hold diuretic for one week, then decrease to once weekly. \par \par Today, he denies any pain to include back pain. Denies LE weakness, bowel or bladder dysfunction. On occasion, he experiences intermittent numbness in the plantar surface of both feet that resolves after he takes Torsemide. He reports SOB with moderate exertion since starting Firmagon. Also reports 30 pound weight loss since his open heart surgery in 2017, but weight has remained stable for some time. He also reports fatigue. Denies fever, chills, CP, N/V/D, or any other c/o. \par \par Of note, he has PMH significant for CHF, cardiomyopathy, 1st degree AV block, aortic aneurysm repair, A-fib s/p ablation, pre- DM; followed by Cardiologist Dr. Sullivan and PCP Dr. Santiago. \par \par Patient denies any known family history of cancer.

## 2019-07-05 NOTE — VITALS
[Least Pain Intensity: 0/10] : 0/10 [80: Normal activity with effort; some signs or symptoms of disease.] : 80: Normal activity with effort; some signs or symptoms of disease.  [Date: ____________] : Patient's last distress assessment performed on [unfilled]. [1 - Distress Level] : Distress Level: 1 [Maximal Pain Intensity: 4/10] : 4/10 [Pain Description/Quality: ___] : Pain description/quality: [unfilled] [Pain Location: ___] : Pain Location: [unfilled] [NoTreatment Scheduled] : no treatment scheduled [Pain Interferes with ADLs] : Pain does not interfere with activities of daily living

## 2019-07-17 ENCOUNTER — FORM ENCOUNTER (OUTPATIENT)
Age: 74
End: 2019-07-17

## 2019-07-18 ENCOUNTER — APPOINTMENT (OUTPATIENT)
Dept: HEART AND VASCULAR | Facility: CLINIC | Age: 74
End: 2019-07-18
Payer: MEDICARE

## 2019-07-18 ENCOUNTER — OUTPATIENT (OUTPATIENT)
Dept: OUTPATIENT SERVICES | Facility: HOSPITAL | Age: 74
LOS: 1 days | End: 2019-07-18
Payer: MEDICARE

## 2019-07-18 ENCOUNTER — NON-APPOINTMENT (OUTPATIENT)
Age: 74
End: 2019-07-18

## 2019-07-18 VITALS
SYSTOLIC BLOOD PRESSURE: 105 MMHG | DIASTOLIC BLOOD PRESSURE: 58 MMHG | HEIGHT: 77 IN | HEART RATE: 35 BPM | WEIGHT: 190 LBS | BODY MASS INDEX: 22.43 KG/M2

## 2019-07-18 DIAGNOSIS — S42.009A FRACTURE OF UNSPECIFIED PART OF UNSPECIFIED CLAVICLE, INITIAL ENCOUNTER FOR CLOSED FRACTURE: Chronic | ICD-10-CM

## 2019-07-18 DIAGNOSIS — R07.89 OTHER CHEST PAIN: ICD-10-CM

## 2019-07-18 LAB
ANION GAP SERPL CALC-SCNC: 14 MMOL/L
BASOPHILS # BLD AUTO: 0.01 K/UL
BASOPHILS NFR BLD AUTO: 0.2 %
BUN SERPL-MCNC: 19 MG/DL
CALCIUM SERPL-MCNC: 9.6 MG/DL
CHLORIDE SERPL-SCNC: 103 MMOL/L
CO2 SERPL-SCNC: 25 MMOL/L
CREAT SERPL-MCNC: 1.17 MG/DL
EOSINOPHIL # BLD AUTO: 0.09 K/UL
EOSINOPHIL NFR BLD AUTO: 1.6 %
ERYTHROCYTE [SEDIMENTATION RATE] IN BLOOD BY WESTERGREN METHOD: 25 MM/HR
GLUCOSE SERPL-MCNC: 105 MG/DL
HCT VFR BLD CALC: 37.1 %
HGB BLD-MCNC: 12.2 G/DL
IMM GRANULOCYTES NFR BLD AUTO: 0.5 %
LYMPHOCYTES # BLD AUTO: 0.56 K/UL
LYMPHOCYTES NFR BLD AUTO: 9.8 %
MAN DIFF?: NORMAL
MCHC RBC-ENTMCNC: 32.5 PG
MCHC RBC-ENTMCNC: 32.9 GM/DL
MCV RBC AUTO: 98.9 FL
MONOCYTES # BLD AUTO: 0.38 K/UL
MONOCYTES NFR BLD AUTO: 6.7 %
NEUTROPHILS # BLD AUTO: 4.63 K/UL
NEUTROPHILS NFR BLD AUTO: 81.2 %
NT-PROBNP SERPL-MCNC: 1214 PG/ML
PLATELET # BLD AUTO: 167 K/UL
POTASSIUM SERPL-SCNC: 4.3 MMOL/L
RBC # BLD: 3.75 M/UL
RBC # FLD: 12.8 %
SODIUM SERPL-SCNC: 142 MMOL/L
TSH SERPL-ACNC: 3.58 UIU/ML
WBC # FLD AUTO: 5.7 K/UL

## 2019-07-18 PROCEDURE — 93000 ELECTROCARDIOGRAM COMPLETE: CPT

## 2019-07-18 PROCEDURE — 93306 TTE W/DOPPLER COMPLETE: CPT

## 2019-07-18 PROCEDURE — 93306 TTE W/DOPPLER COMPLETE: CPT | Mod: 26

## 2019-07-18 PROCEDURE — 99215 OFFICE O/P EST HI 40 MIN: CPT | Mod: 25

## 2019-07-24 NOTE — PHYSICAL EXAM
[General Appearance - Well Developed] : well developed [Normal Appearance] : normal appearance [Well Groomed] : well groomed [General Appearance - Well Nourished] : well nourished [No Deformities] : no deformities [General Appearance - In No Acute Distress] : no acute distress [Normal Conjunctiva] : the conjunctiva exhibited no abnormalities [Normal Oral Mucosa] : normal oral mucosa [Normal Jugular Venous V Waves Present] : normal jugular venous V waves present [Respiration, Rhythm And Depth] : normal respiratory rhythm and effort [Exaggerated Use Of Accessory Muscles For Inspiration] : no accessory muscle use [Auscultation Breath Sounds / Voice Sounds] : lungs were clear to auscultation bilaterally [Abnormal Walk] : normal gait [Cyanosis, Localized] : no localized cyanosis [Skin Color & Pigmentation] : normal skin color and pigmentation [Oriented To Time, Place, And Person] : oriented to person, place, and time [5th Left ICS - MCL] : palpated at the 5th LICS in the midclavicular line [Normal] : normal [No Precordial Heave] : no precordial heave was noted [Normal Rate] : normal [Regularly Irregular] : regularly irregular [] : no rash [Impaired Insight] : insight and judgment were intact

## 2019-07-25 ENCOUNTER — NON-APPOINTMENT (OUTPATIENT)
Age: 74
End: 2019-07-25

## 2019-07-25 ENCOUNTER — APPOINTMENT (OUTPATIENT)
Dept: HEART AND VASCULAR | Facility: CLINIC | Age: 74
End: 2019-07-25
Payer: MEDICARE

## 2019-07-25 VITALS — DIASTOLIC BLOOD PRESSURE: 72 MMHG | SYSTOLIC BLOOD PRESSURE: 146 MMHG | HEART RATE: 54 BPM

## 2019-07-25 PROCEDURE — 93000 ELECTROCARDIOGRAM COMPLETE: CPT

## 2019-07-25 PROCEDURE — 99214 OFFICE O/P EST MOD 30 MIN: CPT | Mod: 25

## 2019-07-25 NOTE — PHYSICAL EXAM
[General Appearance - Well Developed] : well developed [Normal Appearance] : normal appearance [Well Groomed] : well groomed [General Appearance - Well Nourished] : well nourished [No Deformities] : no deformities [General Appearance - In No Acute Distress] : no acute distress [Normal Conjunctiva] : the conjunctiva exhibited no abnormalities [Normal Oral Mucosa] : normal oral mucosa [Normal Jugular Venous V Waves Present] : normal jugular venous V waves present [Respiration, Rhythm And Depth] : normal respiratory rhythm and effort [Exaggerated Use Of Accessory Muscles For Inspiration] : no accessory muscle use [Abnormal Walk] : normal gait [Cyanosis, Localized] : no localized cyanosis [Skin Color & Pigmentation] : normal skin color and pigmentation [] : no rash [Oriented To Time, Place, And Person] : oriented to person, place, and time [Impaired Insight] : insight and judgment were intact [5th Left ICS - MCL] : palpated at the 5th LICS in the midclavicular line [Normal] : normal [No Precordial Heave] : no precordial heave was noted [Normal Rate] : normal [Regularly Irregular] : regularly irregular [Heart Rate And Rhythm] : heart rate and rhythm were normal [Heart Sounds] : normal S1 and S2 [Edema] : no peripheral edema present [No Anxiety] : not feeling anxious

## 2019-07-25 NOTE — HISTORY OF PRESENT ILLNESS
[FreeTextEntry1] : 73 year old male with PVCs s/p unsuccessful ablation at OSH , history of AAA s/p repair 2017, HTN, cardiomyopathy and metastatic prostate cancer, who underwent a EP study with ablation of inferior basal RV PVC, who presents for followup.\par \par As per notes he has had an EF as low as 30% and as per note from [facility] he was recommended to have an ICD but deferred this. Most recent echo with EF 40%.  He states that he had prostate cancer and was in remission but it came back and has metastasis to the spine.  He believes his cardiomyopathy was secondary to the prior medication he was on (lupron) and was told by his oncologist this was unlikely.  He states he had one episode  of syncope right before his open heart surgery.  He states that recently he was feeling lightheaded and had an episode where he tripped.  He states he stopped taking his torsemide (now only takes PRN). \par \par He underwent a PVC ablation (inferior basal RV PVC) 6/2019.  He states that he felt great for a few days after discharge; but then started to experience the lightheadedness he had before.  He states it was slightly better then what he experience; but still there and interfering with his daily life.  He states it has stopped him from leaving his house.  One time he did and he had a near syncopal episode.  He reduced his MEtoprolol and felt a slight improvement in the way he felt.  No palpitations, chest pain, SOB.  \par    \par

## 2019-07-25 NOTE — REVIEW OF SYSTEMS
[Negative] : Heme/Lymph [Feeling Fatigued] : feeling fatigued [see HPI] : see HPI [Fever] : no fever [Chills] : no chills

## 2019-07-25 NOTE — DISCUSSION/SUMMARY
[FreeTextEntry1] : 73 year old male with PVCs s/p unsuccessful ablation at OSH , history of AAA s/p repair 2017, HTN, cardiomyopathy and metastatic prostate cancer, who underwent a EP study with ablation of inferior basal RV PVC, who presents for followup.  His PVC is back today on his EKG.  I have asked him to get blood work and a echocardiogram to assure there are no abnormalities.  He will stay on the lower dose of Metoprolol.  We discussed that the PVC is back and we can try an antiarrhythmic medication; but I would like to see his blood work first.  Once we review this I will send him a prescription for Mexiletine to see if this helpw.   He will follow up in 1 month or sooner if needed.  He knows to call with any questions or concerns.

## 2019-07-25 NOTE — REVIEW OF SYSTEMS
[see HPI] : see HPI [Negative] : Gastrointestinal [Fever] : no fever [Recent Weight Gain (___ Lbs)] : no recent weight gain [Chills] : no chills [Recent Weight Loss (___ Lbs)] : no recent weight loss

## 2019-07-25 NOTE — DISCUSSION/SUMMARY
[FreeTextEntry1] : 73 year old male with PVCs s/p unsuccessful ablation at OSH , history of AAA s/p repair 2017, HTN, cardiomyopathy and metastatic prostate cancer, who underwent a EP study with ablation of inferior basal RV PVC, who presents for followup.  He is responding well to Mexiletine with an improvement in his symptoms and no PVCs on today's EKG.  We will decrease this to twice daily.  He will continue to monitor his symptoms and follow up in 3 months or sooner if needed.  He knows to call with any questions or concerns.

## 2019-08-21 ENCOUNTER — MEDICATION RENEWAL (OUTPATIENT)
Age: 74
End: 2019-08-21

## 2019-08-22 ENCOUNTER — RX RENEWAL (OUTPATIENT)
Age: 74
End: 2019-08-22

## 2019-08-25 ENCOUNTER — RX RENEWAL (OUTPATIENT)
Age: 74
End: 2019-08-25

## 2019-09-12 ENCOUNTER — NON-APPOINTMENT (OUTPATIENT)
Age: 74
End: 2019-09-12

## 2019-09-12 ENCOUNTER — APPOINTMENT (OUTPATIENT)
Dept: HEART AND VASCULAR | Facility: CLINIC | Age: 74
End: 2019-09-12
Payer: MEDICARE

## 2019-09-12 VITALS
WEIGHT: 188 LBS | HEIGHT: 77 IN | SYSTOLIC BLOOD PRESSURE: 132 MMHG | BODY MASS INDEX: 22.2 KG/M2 | DIASTOLIC BLOOD PRESSURE: 65 MMHG | HEART RATE: 48 BPM

## 2019-09-12 PROCEDURE — 99214 OFFICE O/P EST MOD 30 MIN: CPT | Mod: 25

## 2019-09-12 PROCEDURE — 93000 ELECTROCARDIOGRAM COMPLETE: CPT

## 2019-09-12 RX ORDER — ASPIRIN ENTERIC COATED TABLETS 81 MG 81 MG/1
81 TABLET, DELAYED RELEASE ORAL DAILY
Qty: 60 | Refills: 0 | Status: ACTIVE | COMMUNITY
Start: 2017-08-16

## 2019-09-12 NOTE — PHYSICAL EXAM
[General Appearance - Well Developed] : well developed [Well Groomed] : well groomed [Normal Appearance] : normal appearance [General Appearance - Well Nourished] : well nourished [No Deformities] : no deformities [Normal Oral Mucosa] : normal oral mucosa [Normal Conjunctiva] : the conjunctiva exhibited no abnormalities [General Appearance - In No Acute Distress] : no acute distress [Normal Jugular Venous V Waves Present] : normal jugular venous V waves present [Respiration, Rhythm And Depth] : normal respiratory rhythm and effort [Exaggerated Use Of Accessory Muscles For Inspiration] : no accessory muscle use [Heart Rate And Rhythm] : heart rate and rhythm were normal [Heart Sounds] : normal S1 and S2 [Edema] : no peripheral edema present [Abnormal Walk] : normal gait [Cyanosis, Localized] : no localized cyanosis [Skin Color & Pigmentation] : normal skin color and pigmentation [] : no rash [Oriented To Time, Place, And Person] : oriented to person, place, and time [Impaired Insight] : insight and judgment were intact [No Anxiety] : not feeling anxious

## 2019-10-08 NOTE — ADDENDUM
[FreeTextEntry1] : Patient called stating he needs electrophysiology clearance for upcoming colonoscopy.  There is no EP contraindication to proceeding with a colonoscopy.  Most recent EF is 45% and he has PVCs which he is now asymptomatic from and should continue Metoprolol and Mexiletine as prescribed. Any questions please call [phone]

## 2019-10-08 NOTE — HISTORY OF PRESENT ILLNESS
[FreeTextEntry1] : 73 year old male with PVCs s/p unsuccessful ablation at OSH , history of AAA s/p repair 2017, HTN, cardiomyopathy and metastatic prostate cancer, who underwent a EP study with ablation of inferior basal RV PVC, who presents for followup.\par \par As per notes he has had an EF as low as 30% and as per note from [facility] he was recommended to have an ICD but deferred this. Most recent echo with EF 40%.  He states that he had prostate cancer and was in remission but it came back and has metastasis to the spine.  He believes his cardiomyopathy was secondary to the prior medication he was on (lupron) and was told by his oncologist this was unlikely.  He states he had one episode  of syncope right before his open heart surgery.  He states that recently he was feeling lightheaded and had an episode where he tripped.  He states he stopped taking his torsemide (now only takes PRN). \par \par He underwent a PVC ablation (inferior basal RV PVC) 6/2019.  He states that he felt great for a few days after discharge; but then started to experience the lightheadedness he had before.  EKG showed recurrent PVCs and he went for an echocardiogram which showed  EF 45% and no effusion.  He was started on Mexiletine and presents for follow up.  He notes a significant improvement in the way he feels.  No more lightheadedness.  No palpitations, syncope, chest pain. He states he stopped the Mexiletine by accident.  \par    \par  \par

## 2019-10-08 NOTE — DISCUSSION/SUMMARY
[FreeTextEntry1] : 73 year old male with PVCs s/p unsuccessful ablation at OSH , history of AAA s/p repair 2017, HTN, cardiomyopathy and metastatic prostate cancer, who underwent a EP study with ablation of inferior basal RV PVC, who presents for followup.  He notes a significant improvement in the way he feels.  He has PVCs on today's EKG that are a different morphology then what we have seen before.  I have asked him to restart twice a day Mexiletine.  He will continue to monitor his symptoms and follow up in 3 months or sooner if needed.  He knows to call with any questions or concerns.

## 2019-10-10 NOTE — H&P ADULT - HEMATOLOGY/LYMPHATICS
[de-identified] : RENAY MOREJON is a 76 year woman with a history presbycusis (not using hearing aids) and of recent left parotid infection while visiting [place]. She was in the hospital for 5 days with IV antibiotics ans is just finishing Clindamycin 150 mg tid. She feels much better bit has some tenderness. A left thyroid mass was also noted.\par \par A left thyroid mass was seen on CT. This is long standing and is being followed by Dr. JACKIE Ortiz [facility]. negative

## 2019-10-23 ENCOUNTER — APPOINTMENT (OUTPATIENT)
Dept: INTERNAL MEDICINE | Facility: CLINIC | Age: 74
End: 2019-10-23
Payer: MEDICARE

## 2019-10-23 VITALS
HEIGHT: 74 IN | HEART RATE: 71 BPM | DIASTOLIC BLOOD PRESSURE: 75 MMHG | OXYGEN SATURATION: 100 % | WEIGHT: 183.25 LBS | TEMPERATURE: 97.5 F | SYSTOLIC BLOOD PRESSURE: 113 MMHG | BODY MASS INDEX: 23.52 KG/M2

## 2019-10-23 DIAGNOSIS — R91.1 SOLITARY PULMONARY NODULE: ICD-10-CM

## 2019-10-23 PROCEDURE — 99214 OFFICE O/P EST MOD 30 MIN: CPT

## 2019-10-30 ENCOUNTER — APPOINTMENT (OUTPATIENT)
Dept: PULMONOLOGY | Facility: CLINIC | Age: 74
End: 2019-10-30
Payer: MEDICARE

## 2019-10-30 VITALS
HEIGHT: 74 IN | WEIGHT: 183 LBS | SYSTOLIC BLOOD PRESSURE: 100 MMHG | BODY MASS INDEX: 23.49 KG/M2 | OXYGEN SATURATION: 98 % | DIASTOLIC BLOOD PRESSURE: 82 MMHG | HEART RATE: 81 BPM | TEMPERATURE: 97 F

## 2019-10-30 PROCEDURE — 94727 GAS DIL/WSHOT DETER LNG VOL: CPT

## 2019-10-30 PROCEDURE — 99204 OFFICE O/P NEW MOD 45 MIN: CPT | Mod: 25

## 2019-10-30 PROCEDURE — 71046 X-RAY EXAM CHEST 2 VIEWS: CPT

## 2019-10-30 PROCEDURE — 94060 EVALUATION OF WHEEZING: CPT

## 2019-10-30 PROCEDURE — 94729 DIFFUSING CAPACITY: CPT

## 2019-10-30 NOTE — HISTORY OF PRESENT ILLNESS
[FreeTextEntry1] : 10/30/2019: Asked to evaluate patient by Dr Santiago for cough; he has metastatic prostate cancer and CM w EF 40%. \par \par This is a somewhat complicated story. The putative reason for the consult is a cough for 6 mos productive of a small amount of clear sputum. He thinks this is because of his metastatic prostate cancer though his oncologist tells him it isn't. Smoked for 50 years x 1 ppd, quit 10 years ago. Dyspneic on exertion. Sensation of mucus in throat, and has reflux symptoms. Had childhood "bronchitis." He's an artist (oils, acrylics, wood) and writer.\par \par However, his major complaint is of severe episodic weakness on a background of weakness, fatigue and dyspnea. He was assisted up here by a stranger after she found him leaning on the wall downstairs moaning. This morning he felt so weak and lightheaded he couldn't do anything, had to lie down, felt better, but couldn't get up and moving. The episodes come and go. He relates that he's been worse for 2 weeks (saw IM 1 week ago) but he's felt this way before. He's had 2 previous ablations and felt better after the 2nd one. One month ago he could walk a block; now he can't walk more than 1/2 a block. These episodes are also marked by neck and back stiffness, lungs karin, unable to breathe. \par

## 2019-10-30 NOTE — PHYSICAL EXAM
[General Appearance - In No Acute Distress] : no acute distress [Normal Conjunctiva] : the conjunctiva exhibited no abnormalities [Normal Oropharynx] : normal oropharynx [Heart Rate And Rhythm] : heart rate and rhythm were normal [Heart Sounds] : normal S1 and S2 [Murmurs] : no murmurs present [Edema] : no peripheral edema present [Auscultation Breath Sounds / Voice Sounds] : lungs were clear to auscultation bilaterally [Bowel Sounds] : normal bowel sounds [Abdomen Soft] : soft [Abdomen Tenderness] : non-tender [Nail Clubbing] : no clubbing of the fingernails [Cyanosis, Localized] : no localized cyanosis [FreeTextEntry1] : no LAD [] : no rash [Affect] : the affect was normal

## 2019-10-30 NOTE — ASSESSMENT
[FreeTextEntry1] : Data reviewed:\par \par CT chest 1/2019 [facility] personally reviewed: mild emphysema\par PA/lat CXR in office 10/30/2019 : clear lungs\par \par [place] 10/30/2019 : mild restriction\par PFT 10/30/19: mild-mod restriction, and DLCO 48%\par \par Bronch path 2017: metastatic prostate cancer in lung bx and LN bx\par \par Impression:\par Episodic fatigue/weakness/dyspnea\par Mild emphysema\par Metastatic prostate cancer\par \par Plan:\par The specific symptoms he's describing seem more likely to be cardiac than pulmonary. A recent CT chest shows just mild emphysema. The PFT shows no obstructive disease. He is encouraged to follow up w Dr Ambrosio. He is welcome to return to see me at any time.

## 2019-11-06 ENCOUNTER — APPOINTMENT (OUTPATIENT)
Dept: HEART AND VASCULAR | Facility: CLINIC | Age: 74
End: 2019-11-06
Payer: MEDICARE

## 2019-11-06 ENCOUNTER — NON-APPOINTMENT (OUTPATIENT)
Age: 74
End: 2019-11-06

## 2019-11-06 VITALS
WEIGHT: 188 LBS | HEIGHT: 74 IN | DIASTOLIC BLOOD PRESSURE: 63 MMHG | BODY MASS INDEX: 24.13 KG/M2 | HEART RATE: 64 BPM | SYSTOLIC BLOOD PRESSURE: 102 MMHG

## 2019-11-06 PROCEDURE — 99214 OFFICE O/P EST MOD 30 MIN: CPT | Mod: 25

## 2019-11-06 PROCEDURE — 93000 ELECTROCARDIOGRAM COMPLETE: CPT

## 2019-11-06 NOTE — PHYSICAL EXAM
[General Appearance - Well Developed] : well developed [Normal Appearance] : normal appearance [Well Groomed] : well groomed [General Appearance - Well Nourished] : well nourished [No Deformities] : no deformities [General Appearance - In No Acute Distress] : no acute distress [Normal Conjunctiva] : the conjunctiva exhibited no abnormalities [Normal Oral Mucosa] : normal oral mucosa [Normal Jugular Venous V Waves Present] : normal jugular venous V waves present [] : no respiratory distress [Respiration, Rhythm And Depth] : normal respiratory rhythm and effort [Exaggerated Use Of Accessory Muscles For Inspiration] : no accessory muscle use [Heart Rate And Rhythm] : heart rate and rhythm were normal [Heart Sounds] : normal S1 and S2 [Edema] : no peripheral edema present [Abnormal Walk] : normal gait [Cyanosis, Localized] : no localized cyanosis [Skin Color & Pigmentation] : normal skin color and pigmentation [Oriented To Time, Place, And Person] : oriented to person, place, and time [Impaired Insight] : insight and judgment were intact [No Anxiety] : not feeling anxious

## 2019-11-11 NOTE — DISCUSSION/SUMMARY
[FreeTextEntry1] : 73 year old male with PVCs s/p unsuccessful ablation at OSH , history of AAA s/p repair 2017, HTN, cardiomyopathy and metastatic prostate cancer, who underwent a EP study with ablation of inferior basal RV PVC, who presents for followup.  He noted a significant improvement in the way he felt last visit with some PVCs on EKG and today he is in NSR with increased fatigue and lightheadedness.  I would like to get a better understanding of his arrhythmia burden to assure this is what is causing his symptoms.  He is agreeable to wearing an event monitor.  He will continue his current medications and follow up in 2-3 weeks or sooner if needed.   He knows to call with any questions or concerns.

## 2019-11-11 NOTE — ADDENDUM
[FreeTextEntry1] : Patient called stating he needs electrophysiology clearance for upcoming colonoscopy.  There is no EP contraindication to proceeding with a colonoscopy.  Most recent EF is 45% and he has PVCs which he is now asymptomatic from and should continue Metoprolol and Mexiletine as prescribed. Any questions please call [phone]

## 2019-11-11 NOTE — HISTORY OF PRESENT ILLNESS
[FreeTextEntry1] : 73 year old male with PVCs s/p unsuccessful ablation at OSH , history of AAA s/p repair 2017, HTN, cardiomyopathy and metastatic prostate cancer, who underwent a EP study with ablation of inferior basal RV PVC, who presents for followup.\par \par As per notes he has had an EF as low as 30% and as per note from [facility] he was recommended to have an ICD but deferred this. Most recent echo with EF 40%.  He states that he had prostate cancer and was in remission but it came back and has metastasis to the spine.  He believes his cardiomyopathy was secondary to the prior medication he was on (lupron) and was told by his oncologist this was unlikely.  He states he had one episode  of syncope right before his open heart surgery.  He states that recently he was feeling lightheaded and had an episode where he tripped.  He states he stopped taking his torsemide (now only takes PRN). \par \par He underwent a PVC ablation (inferior basal RV PVC) 6/2019.  He states that he felt great for a few days after discharge; but then started to experience the lightheadedness he had before.  EKG showed recurrent PVCs and he went for an echocardiogram which showed  EF 45% and no effusion.  He was started on Mexiletine and presents for follow up.  On his last visit he noted a significant improvement in the way he felt, but presents today complaining of increased lightheadedness and fatigue.   \par  \par

## 2019-11-11 NOTE — REVIEW OF SYSTEMS
[see HPI] : see HPI [Negative] : Heme/Lymph [Fever] : no fever [Recent Weight Gain (___ Lbs)] : no recent weight gain [Chills] : no chills [Feeling Fatigued] : feeling fatigued [Recent Weight Loss (___ Lbs)] : no recent weight loss

## 2019-11-14 ENCOUNTER — APPOINTMENT (OUTPATIENT)
Dept: HEART AND VASCULAR | Facility: CLINIC | Age: 74
End: 2019-11-14
Payer: MEDICARE

## 2019-11-14 PROCEDURE — 0298T: CPT

## 2019-12-03 ENCOUNTER — OTHER (OUTPATIENT)
Age: 74
End: 2019-12-03

## 2019-12-05 ENCOUNTER — RX RENEWAL (OUTPATIENT)
Age: 74
End: 2019-12-05

## 2019-12-11 ENCOUNTER — APPOINTMENT (OUTPATIENT)
Dept: HEART AND VASCULAR | Facility: CLINIC | Age: 74
End: 2019-12-11

## 2020-01-08 ENCOUNTER — APPOINTMENT (OUTPATIENT)
Dept: UROLOGY | Facility: CLINIC | Age: 75
End: 2020-01-08
Payer: MEDICARE

## 2020-01-08 ENCOUNTER — LABORATORY RESULT (OUTPATIENT)
Age: 75
End: 2020-01-08

## 2020-01-08 VITALS — DIASTOLIC BLOOD PRESSURE: 68 MMHG | SYSTOLIC BLOOD PRESSURE: 105 MMHG | HEART RATE: 84 BPM | TEMPERATURE: 97.2 F

## 2020-01-08 PROCEDURE — 51798 US URINE CAPACITY MEASURE: CPT

## 2020-01-08 PROCEDURE — 99214 OFFICE O/P EST MOD 30 MIN: CPT | Mod: 25

## 2020-01-08 RX ORDER — BICALUTAMIDE 50 MG/1
50 TABLET ORAL DAILY
Qty: 30 | Refills: 4 | Status: DISCONTINUED | COMMUNITY
Start: 2017-08-16 | End: 2020-01-08

## 2020-01-08 NOTE — HISTORY OF PRESENT ILLNESS
[5] : 5 [Lower Back] : lower back [Aching] : aching [Gradual] : gradual [Constant] : constantly [Moderate] : moderate in severity [Worsening] : worsening [FreeTextEntry1] : This is a very pleasant 69yo male referred by Dr. Silva for biochemical recurrence of prostate cancer. He was initially diagnosed around 2011. He did not bring any previous records but he says his PSA was very high >50 at diagnosis. He was treated with EBRT and androgen deprivation for a "short" period. He was told that he was "cancer free" with a very low PSA, <1. He was then lost to followup. PSA is now 6. He also reports urinary frequency and lower back pain. In the past year he was also diagnosed with CHF with EF around 15% on recent ECHO. He was previously very active, now limited to walking about 2 miles a day with some limitations. He is a 30-pack yr smoker. No recent colonoscopy.\par \par 7/11/16 Here for f/u to review scans. CT and bone scan are negative for metastatic disease. He also brings in outside records which indicate PSA was <1 in 2012 before he was lost to followup. Outside records indicate he had cT3, [place] 8 disease with PSA>50 treated with EBRT and neoadjuvant ADT. Here today to discuss next steps. Voiding symptoms are not bothersome.\par \par 8/3/16 Here for f/u. PSA continues to increase, currently <10. Metastatic workup was negative. Voiding symptoms are becoming more bothersome and also complaining of ED. \par \par 9/14/16 Here for f/u. PSA has been steadily increasing consistent with recurrence. Prior metastatic workup negative. Voiding symptoms stable, not very bothersome. Cialis was too expensive for him and he did not want to try this. \par \par 9/28/16 Here for f/u. PSA now 11, has nearly doubled since June 2016. He says he has no current symptoms. Here to further discuss treatment options. \par \par 12/12/16 Here for f/u. PSA continues to increase, now 13. He continues to have difficulty voiding on tamsulosin. Complains of weak stream and sensation of incomplete emptying. \par \par 1/23/17 Here for f/u. 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 He does have lower back pain as well. \par \par 2/22/17 Here for f/u. Had CT chest which shows a 1.1cm solitary spiculated lung nodule. Here to review results and next steps. \par \par 6/07/17 Here for f/u. Had lymph node biopsy in April which confirms metastatic prostate cancer. He had refused any treatment at our last discussion but has had worsening lower back pain. He was started on bicalutamide last week and has already had marked improvement in pain. No recent labs. Here to discuss further treatment options. \par \par 6/28/17 Here for f/u. Had CT C/A/P which shows essentially stable disease with diffuse metastatic lymph nodes, lung nodule, and bone mets including stable 2.0cm sclerotic lesion in L4 vertebral body. His back pain had initially improved but is now bothering him again. \par \par 7/19/17 Here for f/u. Scheduled for cardiac surgery next week. PSA decreased from 40 to 5 on bicalutamide. He is feeling better with improvement in back pain. \par \par 8/30/17 Here for f/u. Underwent aortic arch surgery recently. He has felt fatigued and short of breath from pulmonary effusion. He was started back on bicalutamide shortly after surgery. Feeling frustrated today. Denies back pain or difficulty urinating.\par \par 9/27/17 Here for f/u. Patient is feeling much better today, fatigue resolved. He denies flank pain, back pain or difficulty urinating. \par \par 12/20/17 Here for f/u. PSA now less than 1. Creatinine was over 2 at last visit and patient went to ER. He was diagnosed with dehydration and did not require admission. He is now feeling better. No current complaints. \par \par 4/02/18 Here for f/u. PSA has been under 1. Mild lower back pain recently. C/o gynecomastia.\par \par 9/24/18 Here for f/u. Last PSA in April < 1. Mild lower back pain and gynecomastia stable. Last EF around 50%. He stopped tamsulosin 2 days ago and wants to stop taking it. \par \par 12/17/18 Here for f/u. PSA was 2 in September. Experiencing worsening lower back pain recently. \par \par 1/08/20 Patient last seen 1 year ago. Now on Frimagon, Abiraterone and Prednisone with Dr. Figueroa at [facility]. Went to outside ER last week with urinary retention. He was straight cath'd and sent home. He wasn't taking tamsulosin but restarted it. Also with constipation, no bowel movement in 5 days.

## 2020-01-08 NOTE — ASSESSMENT
[FreeTextEntry1] : 75yo male with recurrent metastatic symptomatic prostate cancer s/p ADT and EBRT\par He has biopsy proven metastatic disease\par Currently on Firmagon, Abiraterone, Prednisone\par No recent blood work\par Check labs today\par Continue f/u with Med Onc\par No evidence of urinary retention today\par Recommend bowel regimen for constipation\par F/u 3 weeks

## 2020-01-08 NOTE — PHYSICAL EXAM
[General Appearance - Well Developed] : well developed [General Appearance - Well Nourished] : well nourished [Normal Appearance] : normal appearance [Well Groomed] : well groomed [General Appearance - In No Acute Distress] : no acute distress [Abdomen Tenderness] : non-tender [Costovertebral Angle Tenderness] : no ~M costovertebral angle tenderness [Abdomen Soft] : soft [Oriented To Time, Place, And Person] : oriented to person, place, and time [Affect] : the affect was normal [Mood] : the mood was normal [Not Anxious] : not anxious [Normal Station and Gait] : the gait and station were normal for the patient's age [No Focal Deficits] : no focal deficits [FreeTextEntry1] : PVR =50cc

## 2020-01-08 NOTE — REVIEW OF SYSTEMS
[Arthralgias] : arthralgias [Joint Pain] : joint pain [Erectile Dysfunction] : erectile dysfunction [SOB on Exertion] : shortness of breath during exertion [Abdominal Pain] : abdominal pain [Constipation] : constipation [see HPI] : see HPI [Negative] : Cardiovascular [FreeTextEntry2] : Gynecomastia

## 2020-01-10 LAB
ALBUMIN SERPL ELPH-MCNC: 4.4 G/DL
ALP BLD-CCNC: 148 U/L
ALT SERPL-CCNC: 14 U/L
ANION GAP SERPL CALC-SCNC: 15 MMOL/L
APPEARANCE: ABNORMAL
AST SERPL-CCNC: 28 U/L
BACTERIA UR CULT: NORMAL
BACTERIA: NEGATIVE
BASOPHILS # BLD AUTO: 0.04 K/UL
BASOPHILS NFR BLD AUTO: 0.3 %
BILIRUB SERPL-MCNC: 0.7 MG/DL
BILIRUBIN URINE: NEGATIVE
BLOOD URINE: ABNORMAL
BUN SERPL-MCNC: 19 MG/DL
CALCIUM SERPL-MCNC: 9.7 MG/DL
CHLORIDE SERPL-SCNC: 96 MMOL/L
CO2 SERPL-SCNC: 21 MMOL/L
COLOR: ABNORMAL
CREAT SERPL-MCNC: 1.6 MG/DL
EOSINOPHIL # BLD AUTO: 0.01 K/UL
EOSINOPHIL NFR BLD AUTO: 0.1 %
GLUCOSE QUALITATIVE U: NEGATIVE
GLUCOSE SERPL-MCNC: 175 MG/DL
HCT VFR BLD CALC: 33.9 %
HGB BLD-MCNC: 10.7 G/DL
HYALINE CASTS: 0 /LPF
IMM GRANULOCYTES NFR BLD AUTO: 0.6 %
KETONES URINE: NEGATIVE
LEUKOCYTE ESTERASE URINE: NEGATIVE
LYMPHOCYTES # BLD AUTO: 0.26 K/UL
LYMPHOCYTES NFR BLD AUTO: 2.1 %
MAN DIFF?: NORMAL
MCHC RBC-ENTMCNC: 31.6 GM/DL
MCHC RBC-ENTMCNC: 31.6 PG
MCV RBC AUTO: 100 FL
MICROSCOPIC-UA: NORMAL
MONOCYTES # BLD AUTO: 0.4 K/UL
MONOCYTES NFR BLD AUTO: 3.2 %
NEUTROPHILS # BLD AUTO: 11.87 K/UL
NEUTROPHILS NFR BLD AUTO: 93.7 %
NITRITE URINE: NEGATIVE
PH URINE: 6.5
PLATELET # BLD AUTO: 247 K/UL
POTASSIUM SERPL-SCNC: 4.5 MMOL/L
PROT SERPL-MCNC: 7.5 G/DL
PROTEIN URINE: ABNORMAL
PSA SERPL-MCNC: 54 NG/ML
RBC # BLD: 3.39 M/UL
RBC # FLD: 13.2 %
RED BLOOD CELLS URINE: 229 /HPF
SODIUM SERPL-SCNC: 132 MMOL/L
SPECIFIC GRAVITY URINE: 1.04
SQUAMOUS EPITHELIAL CELLS: 5 /HPF
TESTOST SERPL-MCNC: <2.5 NG/DL
UROBILINOGEN URINE: ABNORMAL
WBC # FLD AUTO: 12.65 K/UL
WHITE BLOOD CELLS URINE: 15 /HPF

## 2020-01-28 ENCOUNTER — APPOINTMENT (OUTPATIENT)
Dept: INTERNAL MEDICINE | Facility: CLINIC | Age: 75
End: 2020-01-28

## 2020-01-28 ENCOUNTER — APPOINTMENT (OUTPATIENT)
Dept: HEART AND VASCULAR | Facility: CLINIC | Age: 75
End: 2020-01-28

## 2020-01-29 ENCOUNTER — APPOINTMENT (OUTPATIENT)
Dept: UROLOGY | Facility: CLINIC | Age: 75
End: 2020-01-29

## 2020-02-07 ENCOUNTER — APPOINTMENT (OUTPATIENT)
Dept: HEART AND VASCULAR | Facility: CLINIC | Age: 75
End: 2020-02-07
Payer: MEDICARE

## 2020-02-07 ENCOUNTER — EMERGENCY (EMERGENCY)
Facility: HOSPITAL | Age: 75
LOS: 1 days | Discharge: HOME | End: 2020-02-07
Attending: EMERGENCY MEDICINE | Admitting: EMERGENCY MEDICINE
Payer: MEDICARE

## 2020-02-07 VITALS
OXYGEN SATURATION: 98 % | SYSTOLIC BLOOD PRESSURE: 116 MMHG | HEART RATE: 77 BPM | RESPIRATION RATE: 16 BRPM | TEMPERATURE: 97 F | DIASTOLIC BLOOD PRESSURE: 75 MMHG

## 2020-02-07 VITALS
RESPIRATION RATE: 12 BRPM | TEMPERATURE: 97.7 F | WEIGHT: 155 LBS | DIASTOLIC BLOOD PRESSURE: 80 MMHG | OXYGEN SATURATION: 98 % | HEIGHT: 74 IN | SYSTOLIC BLOOD PRESSURE: 130 MMHG | HEART RATE: 89 BPM | BODY MASS INDEX: 19.89 KG/M2

## 2020-02-07 VITALS
DIASTOLIC BLOOD PRESSURE: 93 MMHG | OXYGEN SATURATION: 99 % | SYSTOLIC BLOOD PRESSURE: 156 MMHG | HEIGHT: 74 IN | WEIGHT: 156.97 LBS | RESPIRATION RATE: 16 BRPM | TEMPERATURE: 98 F | HEART RATE: 78 BPM

## 2020-02-07 DIAGNOSIS — S42.009A FRACTURE OF UNSPECIFIED PART OF UNSPECIFIED CLAVICLE, INITIAL ENCOUNTER FOR CLOSED FRACTURE: Chronic | ICD-10-CM

## 2020-02-07 DIAGNOSIS — R06.02 SHORTNESS OF BREATH: ICD-10-CM

## 2020-02-07 DIAGNOSIS — R39.89 OTHER SYMPTOMS AND SIGNS INVOLVING THE GENITOURINARY SYSTEM: ICD-10-CM

## 2020-02-07 DIAGNOSIS — R10.9 UNSPECIFIED ABDOMINAL PAIN: ICD-10-CM

## 2020-02-07 DIAGNOSIS — R42 DIZZINESS AND GIDDINESS: ICD-10-CM

## 2020-02-07 DIAGNOSIS — R53.1 WEAKNESS: ICD-10-CM

## 2020-02-07 DIAGNOSIS — I50.9 HEART FAILURE, UNSPECIFIED: ICD-10-CM

## 2020-02-07 LAB
ALBUMIN SERPL ELPH-MCNC: 3.8 G/DL — SIGNIFICANT CHANGE UP (ref 3.3–5)
ALP SERPL-CCNC: 189 U/L — HIGH (ref 40–120)
ALT FLD-CCNC: 17 U/L — SIGNIFICANT CHANGE UP (ref 10–45)
ANION GAP SERPL CALC-SCNC: 12 MMOL/L — SIGNIFICANT CHANGE UP (ref 5–17)
APTT BLD: 29 SEC — SIGNIFICANT CHANGE UP (ref 27.5–36.3)
AST SERPL-CCNC: 23 U/L — SIGNIFICANT CHANGE UP (ref 10–40)
BASOPHILS # BLD AUTO: 0 K/UL — SIGNIFICANT CHANGE UP (ref 0–0.2)
BASOPHILS NFR BLD AUTO: 0 % — SIGNIFICANT CHANGE UP (ref 0–2)
BILIRUB SERPL-MCNC: 0.4 MG/DL — SIGNIFICANT CHANGE UP (ref 0.2–1.2)
BUN SERPL-MCNC: 19 MG/DL — SIGNIFICANT CHANGE UP (ref 7–23)
CALCIUM SERPL-MCNC: 9.2 MG/DL — SIGNIFICANT CHANGE UP (ref 8.4–10.5)
CHLORIDE SERPL-SCNC: 104 MMOL/L — SIGNIFICANT CHANGE UP (ref 96–108)
CO2 SERPL-SCNC: 20 MMOL/L — LOW (ref 22–31)
CREAT SERPL-MCNC: 1.24 MG/DL — SIGNIFICANT CHANGE UP (ref 0.5–1.3)
EOSINOPHIL # BLD AUTO: 0 K/UL — SIGNIFICANT CHANGE UP (ref 0–0.5)
EOSINOPHIL NFR BLD AUTO: 0 % — SIGNIFICANT CHANGE UP (ref 0–6)
GLUCOSE SERPL-MCNC: 125 MG/DL — HIGH (ref 70–99)
HCT VFR BLD CALC: 34.1 % — LOW (ref 39–50)
HGB BLD-MCNC: 10.8 G/DL — LOW (ref 13–17)
INR BLD: 1.11 — SIGNIFICANT CHANGE UP (ref 0.88–1.16)
LYMPHOCYTES # BLD AUTO: 0 % — LOW (ref 13–44)
LYMPHOCYTES # BLD AUTO: 0 K/UL — LOW (ref 1–3.3)
MCHC RBC-ENTMCNC: 30.8 PG — SIGNIFICANT CHANGE UP (ref 27–34)
MCHC RBC-ENTMCNC: 31.7 GM/DL — LOW (ref 32–36)
MCV RBC AUTO: 97.2 FL — SIGNIFICANT CHANGE UP (ref 80–100)
MONOCYTES # BLD AUTO: 0.14 K/UL — SIGNIFICANT CHANGE UP (ref 0–0.9)
MONOCYTES NFR BLD AUTO: 1.8 % — LOW (ref 2–14)
NEUTROPHILS # BLD AUTO: 7.31 K/UL — SIGNIFICANT CHANGE UP (ref 1.8–7.4)
NEUTROPHILS NFR BLD AUTO: 96.5 % — HIGH (ref 43–77)
NT-PROBNP SERPL-SCNC: 3857 PG/ML — HIGH (ref 0–300)
PLATELET # BLD AUTO: 198 K/UL — SIGNIFICANT CHANGE UP (ref 150–400)
POTASSIUM SERPL-MCNC: 4.4 MMOL/L — SIGNIFICANT CHANGE UP (ref 3.5–5.3)
POTASSIUM SERPL-SCNC: 4.4 MMOL/L — SIGNIFICANT CHANGE UP (ref 3.5–5.3)
PROT SERPL-MCNC: 7.3 G/DL — SIGNIFICANT CHANGE UP (ref 6–8.3)
PROTHROM AB SERPL-ACNC: 12.7 SEC — SIGNIFICANT CHANGE UP (ref 10–12.9)
RBC # BLD: 3.51 M/UL — LOW (ref 4.2–5.8)
RBC # FLD: 13.9 % — SIGNIFICANT CHANGE UP (ref 10.3–14.5)
SODIUM SERPL-SCNC: 136 MMOL/L — SIGNIFICANT CHANGE UP (ref 135–145)
TROPONIN T SERPL-MCNC: <0.01 NG/ML — SIGNIFICANT CHANGE UP (ref 0–0.01)
WBC # BLD: 7.58 K/UL — SIGNIFICANT CHANGE UP (ref 3.8–10.5)
WBC # FLD AUTO: 7.58 K/UL — SIGNIFICANT CHANGE UP (ref 3.8–10.5)

## 2020-02-07 PROCEDURE — 71045 X-RAY EXAM CHEST 1 VIEW: CPT

## 2020-02-07 PROCEDURE — 71045 X-RAY EXAM CHEST 1 VIEW: CPT | Mod: 26

## 2020-02-07 PROCEDURE — 85730 THROMBOPLASTIN TIME PARTIAL: CPT

## 2020-02-07 PROCEDURE — 99284 EMERGENCY DEPT VISIT MOD MDM: CPT | Mod: 25

## 2020-02-07 PROCEDURE — 85025 COMPLETE CBC W/AUTO DIFF WBC: CPT

## 2020-02-07 PROCEDURE — 99214 OFFICE O/P EST MOD 30 MIN: CPT

## 2020-02-07 PROCEDURE — 93005 ELECTROCARDIOGRAM TRACING: CPT

## 2020-02-07 PROCEDURE — 96374 THER/PROPH/DIAG INJ IV PUSH: CPT

## 2020-02-07 PROCEDURE — 36415 COLL VENOUS BLD VENIPUNCTURE: CPT

## 2020-02-07 PROCEDURE — 80053 COMPREHEN METABOLIC PANEL: CPT

## 2020-02-07 PROCEDURE — 83880 ASSAY OF NATRIURETIC PEPTIDE: CPT

## 2020-02-07 PROCEDURE — 82948 REAGENT STRIP/BLOOD GLUCOSE: CPT

## 2020-02-07 PROCEDURE — 85610 PROTHROMBIN TIME: CPT

## 2020-02-07 PROCEDURE — 99285 EMERGENCY DEPT VISIT HI MDM: CPT

## 2020-02-07 PROCEDURE — 84484 ASSAY OF TROPONIN QUANT: CPT

## 2020-02-07 RX ORDER — FUROSEMIDE 40 MG
20 TABLET ORAL ONCE
Refills: 0 | Status: COMPLETED | OUTPATIENT
Start: 2020-02-07 | End: 2020-02-07

## 2020-02-07 RX ADMIN — Medication 20 MILLIGRAM(S): at 14:41

## 2020-02-07 NOTE — ED PROVIDER NOTE - [id number]_ED_A_ED
Spoke with patient and appointment scheduled for CT SCAN   Attending Attestation (For Attendings USE Only)...

## 2020-02-07 NOTE — ED PROVIDER NOTE - NSFOLLOWUPINSTRUCTIONS_ED_ALL_ED_FT
FOLLOW UP WITH DR AQUINO FOR EVALUATION AND POSSIBLY RESTARTING YOUR DIURETIC   Heart Failure  Heart failure means your heart has trouble pumping blood. This makes it hard for your body to work well. Heart failure is usually a long-term (chronic) condition. You must take good care of yourself and follow your treatment plan from your doctor.  Follow these instructions at home:  Medicines   Take over-the-counter and prescription medicines only as told by your doctor.  Do not stop taking your medicine unless your doctor told you to do that.Do not skip any doses.Refill your prescriptions before you run out of medicine. You need your medicines every day.Eating and drinking   Eat heart-healthy foods. Talk with a diet and nutrition specialist (dietitian) to make an eating plan.Choose foods that:  Have no trans fat.Are low in saturated fat and cholesterol.Choose healthy foods, like:  Fresh or frozen fruits and vegetables.Fish.Low-fat (lean) meats.Legumes (like beans, peas, and lentils).Fat-free or low-fat dairy products.Whole-grain foods.High-fiber foods.Limit salt (sodium) if told by your doctor. Ask your nutrition specialist to recommend heart-healthy seasonings.Cook in healthy ways instead of frying. Healthy ways of cooking include:  Roasting.Grilling.Broiling.Baking.Poaching.Steaming.Stir-frying.Limit how much fluid you drink, if told by your doctor.Lifestyle   Do not smoke or use chewing tobacco. Do not use nicotine gum or patches before talking to your doctor.Limit alcohol intake to no more than 1 drink a day for non-pregnant women and 2 drinks a day for men. One drink equals 12 oz of beer, 5 oz of wine, or 1½ oz of hard liquor.  Tell your doctor if you drink alcohol many times a week.Talk with your doctor about whether any alcohol is safe for you.You should stop drinking alcohol:  If your heart has been damaged by alcohol.You have very bad heart failure.Do not use illegal drugs.Lose weight if told by your doctor.Do moderate physical activity if told by your doctor. Ask your doctor what activities are safe for you if:  You are of older age (elderly).You have very bad heart failure.Keep track of important information   Weigh yourself every day.  Weigh yourself every morning after you pee (urinate) and before breakfast.Wear the same amount of clothing each time.Write down your daily weight. Give your record to your doctor.Check and write down your blood pressure as told by your doctor.Check your pulse as told by your doctor.Dealing with heat and cold   If the weather is very hot:  Avoid activity that takes a lot of energy.Use air conditioning or fans, or find a cooler place.Avoid caffeine.Avoid alcohol.Wear clothing that is loose-fitting, lightweight, and light-colored.If the weather is very cold:  Avoid activity that takes a lot of energy.Layer your clothes.Wear mittens or gloves, a hat, and a scarf when you go outside.Avoid alcohol.General instructions   Manage other conditions that you have as told by your doctor.Learn to manage stress. If you need help, ask your doctor.Plan rest periods for when you get tired.Get education and support as needed.Get rehab (rehabilitation) to help you stay independent and to help with everyday tasks.Stay up to date with shots (immunizations), especially pneumococcal and flu (influenza) shots.Keep all follow-up visits as told by your doctor. This is important.Contact a doctor if:  You gain weight quickly.You are more short of breath than normal.You cannot do your normal activities.You tire easily.You cough more than normal, especially with activity.You have any or more puffiness (swelling) in areas such as your hands, feet, ankles, or belly (abdomen).You cannot sleep because it is hard to breathe.You feel like your heart is beating fast (palpitations).You get dizzy or light-headed when you stand up.Get help right away if:  You have trouble breathing.You or someone else notices a change in your awareness. This could be trouble staying awake or trouble concentrating.You have chest pain or discomfort.You pass out (faint).Summary  Heart failure means your heart has trouble pumping blood.Make sure you refill your prescriptions before you run out of medicine. You need your medicines every day.Keep records of your weight and blood pressure to give to your doctor.Contact a doctor if you gain weight quickly.

## 2020-02-07 NOTE — DISCUSSION/SUMMARY
[FreeTextEntry1] : possible incomplete emptying of bladder   r/o left hydronephrosis \par \par \par send for US of bladder and kidneys \par \par \par r/o   anemia  , may require transfusion \par \par

## 2020-02-07 NOTE — PHYSICAL EXAM
[General Appearance - Well Developed] : well developed [General Appearance - Well Nourished] : well nourished [Normal Appearance] : normal appearance [Well Groomed] : well groomed [No Deformities] : no deformities [Normal Conjunctiva] : the conjunctiva exhibited no abnormalities [General Appearance - In No Acute Distress] : no acute distress [Normal Oral Mucosa] : normal oral mucosa [Eyelids - No Xanthelasma] : the eyelids demonstrated no xanthelasmas [No Oral Cyanosis] : no oral cyanosis [No Oral Pallor] : no oral pallor [Normal Jugular Venous V Waves Present] : normal jugular venous V waves present [Normal Jugular Venous A Waves Present] : normal jugular venous A waves present [Respiration, Rhythm And Depth] : normal respiratory rhythm and effort [No Jugular Venous Iglesias A Waves] : no jugular venous iglesias A waves [Exaggerated Use Of Accessory Muscles For Inspiration] : no accessory muscle use [Auscultation Breath Sounds / Voice Sounds] : lungs were clear to auscultation bilaterally [Heart Sounds] : normal S1 and S2 [Heart Rate And Rhythm] : heart rate and rhythm were normal [Murmurs] : no murmurs present [Arterial Pulses Normal] : the arterial pulses were normal [Bowel Sounds] : normal bowel sounds [Edema] : no peripheral edema present [Abdomen Soft] : soft [Abdomen Tenderness] : non-tender [Abdomen Mass (___ Cm)] : no abdominal mass palpated [Abnormal Walk] : normal gait [Gait - Sufficient For Exercise Testing] : the gait was sufficient for exercise testing [Nail Clubbing] : no clubbing of the fingernails [Nail Splinter Hemorrhages] : no splinter hemorrhages of the nails [Petechial Hemorrhages (___cm)] : no petechial hemorrhages [Cyanosis, Localized] : no localized cyanosis [Skin Color & Pigmentation] : normal skin color and pigmentation [Fingers Osler's Nodes] : Osler's nodes were not seenon the fingers [No Venous Stasis] : no venous stasis [] : no rash [Skin Lesions] : no skin lesions [No Xanthoma] : no  xanthoma was observed [FreeTextEntry1] : decreased skin turgor [No Skin Ulcers] : no skin ulcer [Affect] : the affect was normal [Mood] : the mood was normal [Oriented To Time, Place, And Person] : oriented to person, place, and time

## 2020-02-07 NOTE — ED ADULT NURSE REASSESSMENT NOTE - NS ED NURSE REASSESS COMMENT FT1
Pt. getting dressed, states he wants to leave. Pt. made aware ED provider waiting for call back from MD Sullivan. IV removed. Given food.

## 2020-02-07 NOTE — ED PROVIDER NOTE - OBJECTIVE STATEMENT
The pt is a 73 y/o M, who presents to ED from dr jha's office, c/o sob and cp on / off x few wks, last episode this am. Pt states that symptoms are exertional, unable to walk > 1block, cp 2/10 w/walking, sob w/walking, hx CHF, AAA repair, metastatic prostate ca. Denies dizziness, syncope, n/v/d, abd pain, fevers, chills The pt is a 73 y/o M, who presents to ED from dr jha's office, c/o sob and cp on / off x few wks, last episode this am. Pt states that symptoms are exertional, unable to walk > 1block, cp 2/10 w/walking, sob w/walking, hx CHF (stopped diuretic 6 mon ago - states "it was making me faint"), AAA repair, metastatic prostate ca. Denies dizziness, syncope, n/v/d, abd pain, fevers, chills

## 2020-02-07 NOTE — ASSESSMENT
[FreeTextEntry1] : Weakness /dizziness likely secondary to anemia  +/- left hydronephrosis \par \par \par Needs hospitalization

## 2020-02-07 NOTE — ED PROVIDER NOTE - ATTENDING CONTRIBUTION TO CARE
74M hx of metastatic prostate ca, anemia, CHF, AAA repair, here from [facility] Dr. Sullivan office w/ CP and SOB after walking a block. has been going on for several weeks. no longer on diuretic torsemide as he was getting faint and lightheaded, stopped 6 months ago. EKG w/ inferior and lateral TWI. BNP 3800, neg troponin. Plan for IV lasix (20mg as pt gets dizzy) , and discuss w/ Dr. Sullivan inpatient vs outpatient diuresis.

## 2020-02-07 NOTE — HISTORY OF PRESENT ILLNESS
[FreeTextEntry1] : Reports that he was  in urinary retention requiring catheterization a couple of weeks ago \par No fevers, catheter removed\par \par Was on antibiotics\par \par Denies fevers, chills, nausea, vomiting, or diarrhea\par \par Admits to bladder tenderness and left flank discomfort \par \par \par Finger stick is 133 at this time.

## 2020-02-07 NOTE — ED PROVIDER NOTE - CLINICAL SUMMARY MEDICAL DECISION MAKING FREE TEXT BOX
pt c/o exertional sob w/some cp over past few wks, getting worse, today was at his cards office and sent to ed for eval, pt was on diuretic but it was dc'd a few mon ago due to pt feeling "faint" at times, no active cp or sob while in dept. no le edema or crackles to lungs. but elevated bnp, labs otherwise baseline for pt, given a low dose of lasix in ed and urinating well, ambulating around dept w/o dizziness, pt offered admission but refused - demanding to leave, dr jha called but no call back and pt refusing to wait for call back to discuss outpatient plan, states "I don't care what you say, I'm leaving"

## 2020-02-07 NOTE — ED ADULT NURSE NOTE - INTERVENTIONS DEFINITIONS
Instruct patient to call for assistance/Physically safe environment: no spills, clutter or unnecessary equipment/Stretcher in lowest position, wheels locked, appropriate side rails in place/Monitor for mental status changes and reorient to person, place, and time

## 2020-02-07 NOTE — ED PROVIDER NOTE - CARE PLAN
Principal Discharge DX:	CHF (congestive heart failure)  Secondary Diagnosis:	SOB (shortness of breath)

## 2020-02-07 NOTE — ED PROVIDER NOTE - CARE PROVIDER_API CALL
Rivas Sullivan)  Cardiovascular Disease; Internal Medicine; Nuclear Cardiology  [address] [place] [address]  Phone: [phone]  Fax: [phone]  Follow Up Time:

## 2020-02-07 NOTE — ED ADULT NURSE NOTE - OBJECTIVE STATEMENT
Pt. sent by cardiologist for evaluation of anemia, c/o dizziness/weakness w/ SOB on exertion for over 6 months. Pt. also reports Sx when standing for long periods of time (cooking, showering, etc.). Denies SOB at this time, speaking in clear complete sentences on RA. Pt. also c/o on/off "sharp" L sided CP lasting for a few seconds x 3 months, denies pain at this time. No edema noted of b/l LE. Pt. reports on/off bright red and dark bloody stool for at least a year that occurs w/ straining. Ambulatory independently w/ steady gait.

## 2020-02-07 NOTE — REVIEW OF SYSTEMS
[Dyspnea on exertion] : dyspnea during exertion [Feeling Fatigued] : feeling fatigued [Limb Weakness (Paresis)] : limb weakness [Palpitations] : no palpitations [Dizziness] : dizziness [FreeTextEntry1] : left flank tenderness / bladder tenderness  [Negative] : Heme/Lymph

## 2020-02-07 NOTE — ED PROVIDER NOTE - PATIENT PORTAL LINK FT
You can access the FollowMyHealth Patient Portal offered by [facility] by registering at the following website: [email or web address]. By joining [employer]’s FollowMyHealth portal, you will also be able to view your health information using other applications (apps) compatible with our system.

## 2020-02-07 NOTE — REASON FOR VISIT
[Follow-Up - Clinic] : a clinic follow-up of [Fatigue] : feeling tired (fatigue) [Dizziness] : dizziness [FreeTextEntry1] : 73  year old male with metastatic prostate cancer and hx of chronic hypertensive heart disease is s/p ascending aortic aneurysm repair  with   LVEF ~40-50% %    clear indication for ICD is lacking as QRS is narrow. \par \par He presents today for follow up  dizziness past  couple of weeks \par \par  He never did out patient cardiac rehab post cardiothoracic surgery. \par \par He denies chest pains,  orthopnea  but feels weak all the time

## 2020-03-26 NOTE — H&P ADULT - LV FUNCTION ASSESSMENT
Patient:   LA OLIVA            MRN: [id number]            FIN: [id number]              Age:   45 years     Sex:  MALE     :  70   Associated Diagnoses:   None   Author:   Amando Perera   yes

## 2020-03-27 ENCOUNTER — APPOINTMENT (OUTPATIENT)
Dept: UROLOGY | Facility: CLINIC | Age: 75
End: 2020-03-27
Payer: MEDICARE

## 2020-03-27 PROCEDURE — 99213 OFFICE O/P EST LOW 20 MIN: CPT | Mod: 95

## 2020-03-27 NOTE — PHYSICAL EXAM
[General Appearance - Well Developed] : well developed [General Appearance - Well Nourished] : well nourished [Normal Appearance] : normal appearance [Well Groomed] : well groomed [General Appearance - In No Acute Distress] : no acute distress [Oriented To Time, Place, And Person] : oriented to person, place, and time [Affect] : the affect was normal [Mood] : the mood was normal [Not Anxious] : not anxious [Normal Station and Gait] : the gait and station were normal for the patient's age [No Focal Deficits] : no focal deficits

## 2020-03-27 NOTE — ASSESSMENT
[FreeTextEntry1] : 73yo male with recurrent metastatic symptomatic prostate cancer s/p ADT and EBRT\par He has biopsy proven metastatic disease\par Currently on Firmagon, Abiraterone, Prednisone\par Recent PSA = 2.5 and feeling much better on current regimen\par Continue f/u with Med Onc\par F/u 3 months\par

## 2020-03-27 NOTE — HISTORY OF PRESENT ILLNESS
[Urinary Frequency] : urinary frequency [Nocturia] : nocturia [None] : None [FreeTextEntry1] : 3/27/20 \par The patient-doctor relationship has been established in a face to face fashion via real time video/audio HIPAA compliant communication using telemedicine software.  The patient's identity has been confirmed.  The patient was previously emailed a copy of the telemedicine consent.  They have had a chance to review and has now given verbal consent and has requested care to be assessed and treated through telemedicine and understands there maybe limitations in this process and they may need further follow up care in the office and or hospital settings.   \par  \par Verbal consent given on 3/27/20 at 10:40 by the patient\par \par Follow up visit today for patient's prostate cancer. Patient reports he is doing very well, overall very pleased with his symptoms. Recent PSA=2.5 down from 54 2 months ago. Taking tamsulosin BID. Nocturia 3-4 times a night which he contributes to hydration. Normal bowel movements. Denies any pain. \par \par The patient denies fevers, chills, nausea and or vomiting and no unexplained weight loss. \par  \par All pertinent parts of the patient PFSH (past medical, family and social histories), laboratory, radiological studies and physician notes were reviewed prior to starting the face to face portion of the telemedicine visit.  \par

## 2020-04-06 ENCOUNTER — APPOINTMENT (OUTPATIENT)
Dept: UROLOGY | Facility: CLINIC | Age: 75
End: 2020-04-06

## 2020-05-13 ENCOUNTER — APPOINTMENT (OUTPATIENT)
Dept: HEART AND VASCULAR | Facility: CLINIC | Age: 75
End: 2020-05-13
Payer: MEDICARE

## 2020-05-13 PROCEDURE — 99213 OFFICE O/P EST LOW 20 MIN: CPT | Mod: 95

## 2020-05-13 RX ORDER — COLCHICINE 0.6 MG/1
0.6 TABLET ORAL DAILY
Qty: 7 | Refills: 0 | Status: DISCONTINUED | COMMUNITY
Start: 2019-07-18 | End: 2020-05-13

## 2020-05-13 RX ORDER — TORSEMIDE 20 MG/1
20 TABLET ORAL WEEKLY
Qty: 13 | Refills: 1 | Status: DISCONTINUED | COMMUNITY
Start: 2018-10-22 | End: 2020-05-13

## 2020-07-01 ENCOUNTER — APPOINTMENT (OUTPATIENT)
Dept: INTERNAL MEDICINE | Facility: CLINIC | Age: 75
End: 2020-07-01
Payer: MEDICARE

## 2020-07-01 PROCEDURE — 99215 OFFICE O/P EST HI 40 MIN: CPT | Mod: 95

## 2020-10-23 NOTE — PATIENT PROFILE ADULT - NSASFUNCLEVELADLAMBULATE_GEN_A_NUR
Venipuncture performed to LAC to obtain blood for CMP.  Pressure dressing applied post venipuncture.  No bleeding noted.  Pt tolerated treatment well.   0 = independent

## 2020-11-13 ENCOUNTER — APPOINTMENT (OUTPATIENT)
Dept: HEART AND VASCULAR | Facility: CLINIC | Age: 75
End: 2020-11-13
Payer: MEDICARE

## 2020-11-13 ENCOUNTER — LABORATORY RESULT (OUTPATIENT)
Age: 75
End: 2020-11-13

## 2020-11-13 VITALS
BODY MASS INDEX: 22.07 KG/M2 | SYSTOLIC BLOOD PRESSURE: 150 MMHG | HEIGHT: 74 IN | RESPIRATION RATE: 12 BRPM | HEART RATE: 56 BPM | OXYGEN SATURATION: 96 % | WEIGHT: 172 LBS | DIASTOLIC BLOOD PRESSURE: 80 MMHG | TEMPERATURE: 97 F

## 2020-11-13 DIAGNOSIS — I71.9 AORTIC ANEURYSM OF UNSPECIFIED SITE, W/OUT RUPTURE: ICD-10-CM

## 2020-11-13 DIAGNOSIS — I44.0 ATRIOVENTRICULAR BLOCK, FIRST DEGREE: ICD-10-CM

## 2020-11-13 PROCEDURE — 36415 COLL VENOUS BLD VENIPUNCTURE: CPT

## 2020-11-13 PROCEDURE — 99072 ADDL SUPL MATRL&STAF TM PHE: CPT

## 2020-11-13 PROCEDURE — 93306 TTE W/DOPPLER COMPLETE: CPT

## 2020-11-13 PROCEDURE — 99214 OFFICE O/P EST MOD 30 MIN: CPT

## 2020-11-13 PROCEDURE — 93000 ELECTROCARDIOGRAM COMPLETE: CPT

## 2020-11-14 LAB
ALBUMIN SERPL ELPH-MCNC: 4.3 G/DL
ALP BLD-CCNC: 150 U/L
ALT SERPL-CCNC: 7 U/L
ANION GAP SERPL CALC-SCNC: 15 MMOL/L
APPEARANCE: CLEAR
AST SERPL-CCNC: 16 U/L
BASOPHILS # BLD AUTO: 0.03 K/UL
BASOPHILS NFR BLD AUTO: 0.6 %
BILIRUB SERPL-MCNC: 0.2 MG/DL
BILIRUBIN URINE: NEGATIVE
BLOOD URINE: NEGATIVE
BUN SERPL-MCNC: 18 MG/DL
CALCIUM SERPL-MCNC: 9.8 MG/DL
CHLORIDE SERPL-SCNC: 105 MMOL/L
CHOLEST SERPL-MCNC: 211 MG/DL
CO2 SERPL-SCNC: 22 MMOL/L
COLOR: YELLOW
CREAT SERPL-MCNC: 1.56 MG/DL
CREAT SPEC-SCNC: 232 MG/DL
EOSINOPHIL # BLD AUTO: 0.12 K/UL
EOSINOPHIL NFR BLD AUTO: 2.4 %
GLUCOSE QUALITATIVE U: NEGATIVE
GLUCOSE SERPL-MCNC: 103 MG/DL
HCT VFR BLD CALC: 37.2 %
HDLC SERPL-MCNC: 57 MG/DL
HGB BLD-MCNC: 11.8 G/DL
IMM GRANULOCYTES NFR BLD AUTO: 0.4 %
KETONES URINE: NEGATIVE
LDLC SERPL CALC-MCNC: 115 MG/DL
LEUKOCYTE ESTERASE URINE: NEGATIVE
LYMPHOCYTES # BLD AUTO: 0.68 K/UL
LYMPHOCYTES NFR BLD AUTO: 13.3 %
MAGNESIUM SERPL-MCNC: 2.3 MG/DL
MAN DIFF?: NORMAL
MCHC RBC-ENTMCNC: 31.7 GM/DL
MCHC RBC-ENTMCNC: 32 PG
MCV RBC AUTO: 100.8 FL
MICROALBUMIN 24H UR DL<=1MG/L-MCNC: 16.9 MG/DL
MICROALBUMIN/CREAT 24H UR-RTO: 73 MG/G
MONOCYTES # BLD AUTO: 0.48 K/UL
MONOCYTES NFR BLD AUTO: 9.4 %
NEUTROPHILS # BLD AUTO: 3.77 K/UL
NEUTROPHILS NFR BLD AUTO: 73.9 %
NITRITE URINE: NEGATIVE
NONHDLC SERPL-MCNC: 155 MG/DL
NT-PROBNP SERPL-MCNC: 5583 PG/ML
PH URINE: 6
PLATELET # BLD AUTO: 167 K/UL
POTASSIUM SERPL-SCNC: 5.3 MMOL/L
PROT SERPL-MCNC: 7.5 G/DL
PROTEIN URINE: ABNORMAL
PSA FREE FLD-MCNC: 53 %
PSA FREE SERPL-MCNC: 0.72 NG/ML
PSA SERPL-MCNC: 1.37 NG/ML
RBC # BLD: 3.69 M/UL
RBC # FLD: 14.1 %
SARS-COV-2 IGG SERPL IA-ACNC: <0.1 INDEX
SARS-COV-2 IGG SERPL QL IA: NEGATIVE
SODIUM SERPL-SCNC: 142 MMOL/L
SPECIFIC GRAVITY URINE: 1.02
TRIGL SERPL-MCNC: 198 MG/DL
TSH SERPL-ACNC: 3.7 UIU/ML
UROBILINOGEN URINE: NORMAL
WBC # FLD AUTO: 5.1 K/UL

## 2020-11-16 ENCOUNTER — APPOINTMENT (OUTPATIENT)
Dept: INTERNAL MEDICINE | Facility: CLINIC | Age: 75
End: 2020-11-16
Payer: MEDICARE

## 2020-11-16 VITALS
TEMPERATURE: 97.3 F | HEART RATE: 65 BPM | SYSTOLIC BLOOD PRESSURE: 140 MMHG | BODY MASS INDEX: 22.07 KG/M2 | DIASTOLIC BLOOD PRESSURE: 90 MMHG | OXYGEN SATURATION: 98 % | HEIGHT: 74 IN | WEIGHT: 172 LBS

## 2020-11-16 DIAGNOSIS — R06.00 DYSPNEA, UNSPECIFIED: ICD-10-CM

## 2020-11-16 PROCEDURE — 99214 OFFICE O/P EST MOD 30 MIN: CPT

## 2020-11-16 PROCEDURE — 99072 ADDL SUPL MATRL&STAF TM PHE: CPT

## 2020-11-21 ENCOUNTER — NON-APPOINTMENT (OUTPATIENT)
Age: 75
End: 2020-11-21

## 2020-11-21 DIAGNOSIS — I49.8 OTHER SPECIFIED CARDIAC ARRHYTHMIAS: ICD-10-CM

## 2020-11-21 PROCEDURE — 0298T: CPT

## 2020-11-21 PROCEDURE — 0296T: CPT

## 2020-11-21 PROCEDURE — 99072 ADDL SUPL MATRL&STAF TM PHE: CPT

## 2020-11-27 RX ORDER — METOPROLOL SUCCINATE 25 MG/1
25 TABLET, EXTENDED RELEASE ORAL DAILY
Qty: 45 | Refills: 2 | Status: ACTIVE | COMMUNITY
Start: 2018-10-22 | End: 1900-01-01

## 2020-12-17 PROBLEM — I44.0 1ST DEGREE AV BLOCK: Status: ACTIVE | Noted: 2018-11-02

## 2020-12-17 NOTE — REVIEW OF SYSTEMS
[Feeling Fatigued] : feeling fatigued [Dyspnea on exertion] : dyspnea during exertion [Palpitations] : palpitations [Limb Weakness (Paresis)] : limb weakness [Dizziness] : dizziness [Negative] : Heme/Lymph

## 2020-12-17 NOTE — REASON FOR VISIT
[Follow-Up - Clinic] : a clinic follow-up of [Abnormal ECG] : an abnormal ECG [Cardiomyopathy] : cardiomyopathy [Dyspnea] : dyspnea [Palpitations] : palpitations [FreeTextEntry1] : 75  year old male with metastatic prostate cancer and hx of chronic hypertensive heart disease is s/p ascending aortic aneurysm repair  with   LVEF ~40-50% %    clear indication for ICD is lacking as QRS is narrow. \par \par  He never participated in  patient cardiac rehab post cardiothoracic surgery. \par \par He denies chest pains,  orthopnea  but feels weak all the time

## 2020-12-17 NOTE — HISTORY OF PRESENT ILLNESS
[FreeTextEntry1] : EKG shows NSR  with 1st degree AVB and  LVH with  VPCs  and inferior  ST changes  with normal QTc \par \par Had HD flu vaccine  in the recent past\par \par Denies Covid 19 infection \par \par Increasing palpitations and dizziness past two weeks \par \par  SCHUMACHER after one block\par \par Denies bleeding , fevers, cough

## 2020-12-17 NOTE — DISCUSSION/SUMMARY
[Cardiomyopathy] : cardiomyopathy [Deteriorating] : deteriorating [BNP] : B-type natriuretic peptide [PVCs] : ectopic ventricular beats [Rhythm Disorder] : rhythm disorder [Outpatient Evaluation] : the evalution will be done as an outpatient [Thyroid Function Tests] : thyroid function tests [Echocardiogram] : an echocardiogram [With Me] : with me [de-identified] : Riya patch [FreeTextEntry1] : venipuncture performed with Covid 19 Ab, A1c, lipids,magnesium , BNP, etc \par \par echocardiogram results reviewed with patient \par \par set up 14 day Zio patch  r/o NSVT

## 2020-12-17 NOTE — PHYSICAL EXAM
[General Appearance - Well Developed] : well developed [Normal Appearance] : normal appearance [Well Groomed] : well groomed [General Appearance - Well Nourished] : well nourished [No Deformities] : no deformities [General Appearance - In No Acute Distress] : no acute distress [Normal Conjunctiva] : the conjunctiva exhibited no abnormalities [Eyelids - No Xanthelasma] : the eyelids demonstrated no xanthelasmas [Normal Jugular Venous A Waves Present] : normal jugular venous A waves present [Normal Jugular Venous V Waves Present] : normal jugular venous V waves present [No Jugular Venous Iglesias A Waves] : no jugular venous iglesias A waves [Respiration, Rhythm And Depth] : normal respiratory rhythm and effort [Exaggerated Use Of Accessory Muscles For Inspiration] : no accessory muscle use [Auscultation Breath Sounds / Voice Sounds] : lungs were clear to auscultation bilaterally [Heart Sounds] : normal S1 and S2 [Murmurs] : no murmurs present [Arterial Pulses Normal] : the arterial pulses were normal [Edema] : no peripheral edema present [Bowel Sounds] : normal bowel sounds [Abdomen Soft] : soft [Abdomen Tenderness] : non-tender [Abdomen Mass (___ Cm)] : no abdominal mass palpated [Abnormal Walk] : normal gait [Gait - Sufficient For Exercise Testing] : the gait was sufficient for exercise testing [Nail Clubbing] : no clubbing of the fingernails [Cyanosis, Localized] : no localized cyanosis [Petechial Hemorrhages (___cm)] : no petechial hemorrhages [Nail Splinter Hemorrhages] : no splinter hemorrhages of the nails [Fingers Osler's Nodes] : Osler's nodes were not seenon the fingers [Skin Color & Pigmentation] : normal skin color and pigmentation [] : no rash [No Venous Stasis] : no venous stasis [Skin Lesions] : no skin lesions [No Skin Ulcers] : no skin ulcer [No Xanthoma] : no  xanthoma was observed [FreeTextEntry1] : decreased skin turgor [Oriented To Time, Place, And Person] : oriented to person, place, and time [Affect] : the affect was normal [Mood] : the mood was normal

## 2020-12-18 PROBLEM — I49.8 VENTRICULAR BIGEMINY: Status: ACTIVE | Noted: 2018-11-02

## 2020-12-22 ENCOUNTER — APPOINTMENT (OUTPATIENT)
Dept: HEART AND VASCULAR | Facility: CLINIC | Age: 75
End: 2020-12-22
Payer: MEDICARE

## 2020-12-22 VITALS
RESPIRATION RATE: 12 BRPM | TEMPERATURE: 98 F | HEART RATE: 68 BPM | SYSTOLIC BLOOD PRESSURE: 110 MMHG | OXYGEN SATURATION: 90 % | HEIGHT: 74 IN | DIASTOLIC BLOOD PRESSURE: 70 MMHG

## 2020-12-22 DIAGNOSIS — N28.9 DISORDER OF KIDNEY AND URETER, UNSPECIFIED: ICD-10-CM

## 2020-12-22 DIAGNOSIS — I49.3 VENTRICULAR PREMATURE DEPOLARIZATION: ICD-10-CM

## 2020-12-22 DIAGNOSIS — R00.2 PALPITATIONS: ICD-10-CM

## 2020-12-22 PROCEDURE — 99214 OFFICE O/P EST MOD 30 MIN: CPT

## 2020-12-22 PROCEDURE — 99072 ADDL SUPL MATRL&STAF TM PHE: CPT

## 2020-12-22 RX ORDER — SACUBITRIL AND VALSARTAN 49; 51 MG/1; MG/1
49-51 TABLET, FILM COATED ORAL
Qty: 180 | Refills: 1 | Status: DISCONTINUED | COMMUNITY
Start: 2018-08-29 | End: 2020-12-22

## 2020-12-23 PROBLEM — I49.3 PREMATURE VENTRICULAR CONTRACTION: Status: ACTIVE | Noted: 2018-10-22

## 2020-12-23 PROBLEM — R00.2 PALPITATIONS: Status: ACTIVE | Noted: 2020-12-17

## 2020-12-23 PROBLEM — N28.9 RENAL INSUFFICIENCY: Status: ACTIVE | Noted: 2020-12-23

## 2020-12-23 PROBLEM — I49.3 PREMATURE VENTRICULAR CONTRACTIONS (PVCS) (VPCS): Status: ACTIVE | Noted: 2018-11-02

## 2020-12-23 NOTE — ASSESSMENT
[FreeTextEntry1] : Nonischemic cardiomyopathy\par \par Compensated CHF, LVEF 30%\par \par \par NSVT\par \par \par Metastatic prostate cancer\par \par \par recent gross hematuria

## 2020-12-23 NOTE — HISTORY OF PRESENT ILLNESS
[FreeTextEntry1] : \par \par BNP 5583  with increasing dyspnea \par \par metastatic prostate cancer  with elevated   testosterone \par \par recent blood   shows mild  no anemia with Cr 1.56 mg/dL\par \par LVEF ~ 40% \par \par Had Zio patch arrhythmia monitoring showing frequent VPCs and  NSVT\par \par Admitted to [facility]  were he underwent cardiac catheterization:  normal coronaries with mild pulmonary hypertension   was detected \par \par \par He has been variably compliant with medication / somewhat confused  and admits that he has been taking Entresto only once daily \par

## 2020-12-23 NOTE — REASON FOR VISIT
[Follow-Up - Clinic] : a clinic follow-up of [Abnormal ECG] : an abnormal ECG [Cardiomyopathy] : cardiomyopathy [Dizziness] : dizziness [Dyspnea] : dyspnea [Fatigue] : feeling tired (fatigue) [Palpitations] : palpitations [FreeTextEntry1] : 75  year old AA  male with metastatic prostate cancer and hx of chronic hypertensive heart disease is s/p ascending aortic aneurysm repair  with   LVEF ~40-50% %    clear indication for ICD is lacking as QRS is narrow. \par \par  He never participated in  patient cardiac rehab post cardiothoracic surgery. \par \par He denies chest pains,  orthopnea  but feels weak all the time

## 2020-12-23 NOTE — DISCUSSION/SUMMARY
[Cardiomyopathy] : cardiomyopathy [Stable] : stable [___ Week(s)] : [unfilled] week(s) [With Me] : with me [de-identified] : reinforced bid dosing of Entresto  [FreeTextEntry1] : torsemide 20mg po bid\par \par Reinforced BID dosing of Entresto \par \par Will follow up in 2-4 weeks  to reassess BNP levels and will discuss   ICD  vs medical therapy \par \par patient will follow up with urologist regarding gross hematuria \par \par

## 2020-12-28 ENCOUNTER — APPOINTMENT (OUTPATIENT)
Dept: UROLOGY | Facility: CLINIC | Age: 75
End: 2020-12-28
Payer: MEDICARE

## 2020-12-28 VITALS — TEMPERATURE: 97.2 F

## 2020-12-28 DIAGNOSIS — R33.9 RETENTION OF URINE, UNSPECIFIED: ICD-10-CM

## 2020-12-28 PROCEDURE — 51798 US URINE CAPACITY MEASURE: CPT

## 2020-12-28 PROCEDURE — 99072 ADDL SUPL MATRL&STAF TM PHE: CPT

## 2020-12-28 PROCEDURE — 99214 OFFICE O/P EST MOD 30 MIN: CPT | Mod: 25

## 2020-12-28 RX ORDER — ENZALUTAMIDE 40 MG/1
40 CAPSULE ORAL
Refills: 0 | Status: ACTIVE | COMMUNITY

## 2020-12-28 RX ORDER — ABIRATERONE ACETATE 250 MG/1
250 TABLET ORAL
Refills: 0 | Status: DISCONTINUED | COMMUNITY
End: 2020-12-28

## 2020-12-28 RX ORDER — PREDNISONE 50 MG/1
TABLET ORAL
Refills: 0 | Status: DISCONTINUED | COMMUNITY
End: 2020-12-28

## 2020-12-28 NOTE — END OF VISIT
[] : Resident [FreeTextEntry3] : Metastatic prostate cancer currently on Firmagon and Xtandi with good PSA response. Recent gross hematuria. Needs hematuria workup. CT urogram and cystoscopy. Will get restaging for metastatic prostate cancer.

## 2020-12-28 NOTE — PHYSICAL EXAM
[Normal Appearance] : normal appearance [Abdomen Soft] : soft [Abdomen Tenderness] : non-tender [Edema] : no peripheral edema [] : no respiratory distress [Exaggerated Use Of Accessory Muscles For Inspiration] : no accessory muscle use [No Focal Deficits] : no focal deficits [FreeTextEntry1] : No CVA tenderness

## 2020-12-29 ENCOUNTER — TRANSCRIPTION ENCOUNTER (OUTPATIENT)
Age: 75
End: 2020-12-29

## 2020-12-29 LAB
ALBUMIN SERPL ELPH-MCNC: 4.4 G/DL
ALP BLD-CCNC: 161 U/L
ALT SERPL-CCNC: 13 U/L
ANION GAP SERPL CALC-SCNC: 10 MMOL/L
APPEARANCE: CLEAR
AST SERPL-CCNC: 19 U/L
BACTERIA: NEGATIVE
BASOPHILS # BLD AUTO: 0.03 K/UL
BASOPHILS NFR BLD AUTO: 0.6 %
BILIRUB SERPL-MCNC: 0.4 MG/DL
BILIRUBIN URINE: NEGATIVE
BLOOD URINE: NORMAL
BUN SERPL-MCNC: 15 MG/DL
CALCIUM SERPL-MCNC: 10 MG/DL
CHLORIDE SERPL-SCNC: 110 MMOL/L
CO2 SERPL-SCNC: 24 MMOL/L
COLOR: YELLOW
CREAT SERPL-MCNC: 1.75 MG/DL
EOSINOPHIL # BLD AUTO: 0.12 K/UL
EOSINOPHIL NFR BLD AUTO: 2.5 %
GLUCOSE QUALITATIVE U: NEGATIVE
GLUCOSE SERPL-MCNC: 107 MG/DL
HCT VFR BLD CALC: 37.5 %
HGB BLD-MCNC: 11.7 G/DL
HYALINE CASTS: 2 /LPF
IMM GRANULOCYTES NFR BLD AUTO: 0.4 %
KETONES URINE: NORMAL
LEUKOCYTE ESTERASE URINE: NEGATIVE
LYMPHOCYTES # BLD AUTO: 0.71 K/UL
LYMPHOCYTES NFR BLD AUTO: 14.5 %
MAN DIFF?: NORMAL
MCHC RBC-ENTMCNC: 31.2 GM/DL
MCHC RBC-ENTMCNC: 31.8 PG
MCV RBC AUTO: 101.9 FL
MICROSCOPIC-UA: NORMAL
MONOCYTES # BLD AUTO: 0.45 K/UL
MONOCYTES NFR BLD AUTO: 9.2 %
NEUTROPHILS # BLD AUTO: 3.55 K/UL
NEUTROPHILS NFR BLD AUTO: 72.8 %
NITRITE URINE: NEGATIVE
PH URINE: 6
PLATELET # BLD AUTO: 185 K/UL
POTASSIUM SERPL-SCNC: 5.8 MMOL/L
PROT SERPL-MCNC: 7.4 G/DL
PROTEIN URINE: ABNORMAL
PSA SERPL-MCNC: 1.96 NG/ML
RBC # BLD: 3.68 M/UL
RBC # FLD: 14.6 %
RED BLOOD CELLS URINE: 4 /HPF
SODIUM SERPL-SCNC: 144 MMOL/L
SPECIFIC GRAVITY URINE: 1.02
SQUAMOUS EPITHELIAL CELLS: 2 /HPF
TESTOST SERPL-MCNC: <2.5 NG/DL
UROBILINOGEN URINE: NORMAL
WBC # FLD AUTO: 4.88 K/UL
WHITE BLOOD CELLS URINE: 4 /HPF

## 2020-12-30 LAB
BACTERIA UR CULT: ABNORMAL
URINE CYTOLOGY: NORMAL

## 2020-12-31 ENCOUNTER — EMERGENCY (EMERGENCY)
Facility: HOSPITAL | Age: 75
LOS: 1 days | Discharge: HOME | End: 2020-12-31
Attending: EMERGENCY MEDICINE | Admitting: EMERGENCY MEDICINE
Payer: MEDICARE

## 2020-12-31 ENCOUNTER — NON-APPOINTMENT (OUTPATIENT)
Age: 75
End: 2020-12-31

## 2020-12-31 VITALS
HEIGHT: 74 IN | SYSTOLIC BLOOD PRESSURE: 166 MMHG | HEART RATE: 100 BPM | TEMPERATURE: 98 F | RESPIRATION RATE: 18 BRPM | DIASTOLIC BLOOD PRESSURE: 107 MMHG | OXYGEN SATURATION: 96 % | WEIGHT: 171.96 LBS

## 2020-12-31 VITALS
DIASTOLIC BLOOD PRESSURE: 91 MMHG | TEMPERATURE: 98 F | SYSTOLIC BLOOD PRESSURE: 150 MMHG | HEART RATE: 85 BPM | RESPIRATION RATE: 16 BRPM | OXYGEN SATURATION: 96 %

## 2020-12-31 DIAGNOSIS — R31.9 HEMATURIA, UNSPECIFIED: ICD-10-CM

## 2020-12-31 DIAGNOSIS — M54.5 LOW BACK PAIN: ICD-10-CM

## 2020-12-31 DIAGNOSIS — I11.0 HYPERTENSIVE HEART DISEASE WITH HEART FAILURE: ICD-10-CM

## 2020-12-31 DIAGNOSIS — Z88.8 ALLERGY STATUS TO OTHER DRUGS, MEDICAMENTS AND BIOLOGICAL SUBSTANCES: ICD-10-CM

## 2020-12-31 DIAGNOSIS — Z87.891 PERSONAL HISTORY OF NICOTINE DEPENDENCE: ICD-10-CM

## 2020-12-31 DIAGNOSIS — Z20.828 CONTACT WITH AND (SUSPECTED) EXPOSURE TO OTHER VIRAL COMMUNICABLE DISEASES: ICD-10-CM

## 2020-12-31 DIAGNOSIS — R07.89 OTHER CHEST PAIN: ICD-10-CM

## 2020-12-31 DIAGNOSIS — Z79.899 OTHER LONG TERM (CURRENT) DRUG THERAPY: ICD-10-CM

## 2020-12-31 DIAGNOSIS — S42.009A FRACTURE OF UNSPECIFIED PART OF UNSPECIFIED CLAVICLE, INITIAL ENCOUNTER FOR CLOSED FRACTURE: Chronic | ICD-10-CM

## 2020-12-31 DIAGNOSIS — I50.9 HEART FAILURE, UNSPECIFIED: ICD-10-CM

## 2020-12-31 DIAGNOSIS — Z79.82 LONG TERM (CURRENT) USE OF ASPIRIN: ICD-10-CM

## 2020-12-31 LAB
ALBUMIN SERPL ELPH-MCNC: 4.1 G/DL — SIGNIFICANT CHANGE UP (ref 3.3–5)
ALP SERPL-CCNC: 147 U/L — HIGH (ref 40–120)
ALT FLD-CCNC: 10 U/L — SIGNIFICANT CHANGE UP (ref 10–45)
ANION GAP SERPL CALC-SCNC: 12 MMOL/L — SIGNIFICANT CHANGE UP (ref 5–17)
APPEARANCE UR: ABNORMAL
APTT BLD: 32.4 SEC — SIGNIFICANT CHANGE UP (ref 27.5–35.5)
AST SERPL-CCNC: 18 U/L — SIGNIFICANT CHANGE UP (ref 10–40)
BACTERIA # UR AUTO: SIGNIFICANT CHANGE UP /HPF
BASOPHILS # BLD AUTO: 0.02 K/UL — SIGNIFICANT CHANGE UP (ref 0–0.2)
BASOPHILS NFR BLD AUTO: 0.4 % — SIGNIFICANT CHANGE UP (ref 0–2)
BILIRUB SERPL-MCNC: 0.8 MG/DL — SIGNIFICANT CHANGE UP (ref 0.2–1.2)
BILIRUB UR-MCNC: NEGATIVE — SIGNIFICANT CHANGE UP
BUN SERPL-MCNC: 16 MG/DL — SIGNIFICANT CHANGE UP (ref 7–23)
CALCIUM SERPL-MCNC: 9.6 MG/DL — SIGNIFICANT CHANGE UP (ref 8.4–10.5)
CHLORIDE SERPL-SCNC: 103 MMOL/L — SIGNIFICANT CHANGE UP (ref 96–108)
CO2 SERPL-SCNC: 22 MMOL/L — SIGNIFICANT CHANGE UP (ref 22–31)
COLOR SPEC: ABNORMAL
CREAT SERPL-MCNC: 1.33 MG/DL — HIGH (ref 0.5–1.3)
DIFF PNL FLD: ABNORMAL
EOSINOPHIL # BLD AUTO: 0.11 K/UL — SIGNIFICANT CHANGE UP (ref 0–0.5)
EOSINOPHIL NFR BLD AUTO: 2 % — SIGNIFICANT CHANGE UP (ref 0–6)
EPI CELLS # UR: SIGNIFICANT CHANGE UP /HPF (ref 0–5)
GLUCOSE SERPL-MCNC: 117 MG/DL — HIGH (ref 70–99)
GLUCOSE UR QL: NEGATIVE — SIGNIFICANT CHANGE UP
HCT VFR BLD CALC: 33.1 % — LOW (ref 39–50)
HCT VFR BLD CALC: 34.5 % — LOW (ref 39–50)
HGB BLD-MCNC: 10.6 G/DL — LOW (ref 13–17)
HGB BLD-MCNC: 11 G/DL — LOW (ref 13–17)
IMM GRANULOCYTES NFR BLD AUTO: 0.4 % — SIGNIFICANT CHANGE UP (ref 0–1.5)
INR BLD: 1.23 — HIGH (ref 0.88–1.16)
KETONES UR-MCNC: NEGATIVE — SIGNIFICANT CHANGE UP
LEUKOCYTE ESTERASE UR-ACNC: NEGATIVE — SIGNIFICANT CHANGE UP
LYMPHOCYTES # BLD AUTO: 0.6 K/UL — LOW (ref 1–3.3)
LYMPHOCYTES # BLD AUTO: 10.8 % — LOW (ref 13–44)
MCHC RBC-ENTMCNC: 31.1 PG — SIGNIFICANT CHANGE UP (ref 27–34)
MCHC RBC-ENTMCNC: 31.3 PG — SIGNIFICANT CHANGE UP (ref 27–34)
MCHC RBC-ENTMCNC: 31.9 GM/DL — LOW (ref 32–36)
MCHC RBC-ENTMCNC: 32 GM/DL — SIGNIFICANT CHANGE UP (ref 32–36)
MCV RBC AUTO: 97.5 FL — SIGNIFICANT CHANGE UP (ref 80–100)
MCV RBC AUTO: 97.6 FL — SIGNIFICANT CHANGE UP (ref 80–100)
MONOCYTES # BLD AUTO: 0.41 K/UL — SIGNIFICANT CHANGE UP (ref 0–0.9)
MONOCYTES NFR BLD AUTO: 7.4 % — SIGNIFICANT CHANGE UP (ref 2–14)
NEUTROPHILS # BLD AUTO: 4.39 K/UL — SIGNIFICANT CHANGE UP (ref 1.8–7.4)
NEUTROPHILS NFR BLD AUTO: 79 % — HIGH (ref 43–77)
NITRITE UR-MCNC: NEGATIVE — SIGNIFICANT CHANGE UP
NRBC # BLD: 0 /100 WBCS — SIGNIFICANT CHANGE UP (ref 0–0)
NRBC # BLD: 0 /100 WBCS — SIGNIFICANT CHANGE UP (ref 0–0)
PH UR: 6.5 — SIGNIFICANT CHANGE UP (ref 5–8)
PLATELET # BLD AUTO: 146 K/UL — LOW (ref 150–400)
PLATELET # BLD AUTO: 160 K/UL — SIGNIFICANT CHANGE UP (ref 150–400)
POTASSIUM SERPL-MCNC: 4.3 MMOL/L — SIGNIFICANT CHANGE UP (ref 3.5–5.3)
POTASSIUM SERPL-SCNC: 4.3 MMOL/L — SIGNIFICANT CHANGE UP (ref 3.5–5.3)
PROT SERPL-MCNC: 7.5 G/DL — SIGNIFICANT CHANGE UP (ref 6–8.3)
PROT UR-MCNC: 100 MG/DL
PROTHROM AB SERPL-ACNC: 14.6 SEC — HIGH (ref 10.6–13.6)
RBC # BLD: 3.39 M/UL — LOW (ref 4.2–5.8)
RBC # BLD: 3.54 M/UL — LOW (ref 4.2–5.8)
RBC # FLD: 14.2 % — SIGNIFICANT CHANGE UP (ref 10.3–14.5)
RBC # FLD: 14.4 % — SIGNIFICANT CHANGE UP (ref 10.3–14.5)
RBC CASTS # UR COMP ASSIST: ABNORMAL /HPF
SARS-COV-2 RNA SPEC QL NAA+PROBE: SIGNIFICANT CHANGE UP
SODIUM SERPL-SCNC: 137 MMOL/L — SIGNIFICANT CHANGE UP (ref 135–145)
SP GR SPEC: 1.02 — SIGNIFICANT CHANGE UP (ref 1–1.03)
UROBILINOGEN FLD QL: 0.2 E.U./DL — SIGNIFICANT CHANGE UP
WBC # BLD: 4.69 K/UL — SIGNIFICANT CHANGE UP (ref 3.8–10.5)
WBC # BLD: 5.55 K/UL — SIGNIFICANT CHANGE UP (ref 3.8–10.5)
WBC # FLD AUTO: 4.69 K/UL — SIGNIFICANT CHANGE UP (ref 3.8–10.5)
WBC # FLD AUTO: 5.55 K/UL — SIGNIFICANT CHANGE UP (ref 3.8–10.5)
WBC UR QL: < 5 /HPF — SIGNIFICANT CHANGE UP

## 2020-12-31 PROCEDURE — U0003: CPT

## 2020-12-31 PROCEDURE — 85025 COMPLETE CBC W/AUTO DIFF WBC: CPT

## 2020-12-31 PROCEDURE — 99284 EMERGENCY DEPT VISIT MOD MDM: CPT

## 2020-12-31 PROCEDURE — 87086 URINE CULTURE/COLONY COUNT: CPT

## 2020-12-31 PROCEDURE — 85730 THROMBOPLASTIN TIME PARTIAL: CPT

## 2020-12-31 PROCEDURE — 80053 COMPREHEN METABOLIC PANEL: CPT

## 2020-12-31 PROCEDURE — 36415 COLL VENOUS BLD VENIPUNCTURE: CPT

## 2020-12-31 PROCEDURE — 85610 PROTHROMBIN TIME: CPT

## 2020-12-31 PROCEDURE — 81001 URINALYSIS AUTO W/SCOPE: CPT

## 2020-12-31 PROCEDURE — 85027 COMPLETE CBC AUTOMATED: CPT

## 2020-12-31 RX ORDER — ISOSORBIDE DINITRATE 5 MG/1
20 TABLET ORAL ONCE
Refills: 0 | Status: COMPLETED | OUTPATIENT
Start: 2020-12-31 | End: 2020-12-31

## 2020-12-31 RX ORDER — MEXILETINE HYDROCHLORIDE 150 MG/1
200 CAPSULE ORAL ONCE
Refills: 0 | Status: COMPLETED | OUTPATIENT
Start: 2020-12-31 | End: 2020-12-31

## 2020-12-31 RX ORDER — SPIRONOLACTONE 25 MG/1
25 TABLET, FILM COATED ORAL DAILY
Refills: 0 | Status: DISCONTINUED | OUTPATIENT
Start: 2020-12-31 | End: 2021-01-03

## 2020-12-31 RX ORDER — METOPROLOL TARTRATE 50 MG
25 TABLET ORAL DAILY
Refills: 0 | Status: DISCONTINUED | OUTPATIENT
Start: 2020-12-31 | End: 2021-01-03

## 2020-12-31 RX ADMIN — SPIRONOLACTONE 25 MILLIGRAM(S): 25 TABLET, FILM COATED ORAL at 14:17

## 2020-12-31 RX ADMIN — ISOSORBIDE DINITRATE 20 MILLIGRAM(S): 5 TABLET ORAL at 14:20

## 2020-12-31 RX ADMIN — Medication 25 MILLIGRAM(S): at 14:17

## 2020-12-31 RX ADMIN — MEXILETINE HYDROCHLORIDE 200 MILLIGRAM(S): 150 CAPSULE ORAL at 14:17

## 2020-12-31 NOTE — ED PROVIDER NOTE - OBJECTIVE STATEMENT
76 yo male h/o metastatic prostate ca (spine, lung), bph, paroxysmal afib, chf, cardiomyopathy, aaa s/p repair, htn c/o hematuria.  Pt notes h/o same x several wks, worse since last pm.  Pt takes asa.  No fever, dysuria, change in pt's retention type sx (voiding but feels he doesn't empty completely and states this is his baseline), abd/pelvic pain.  Pt notes L low back pain x 1 yr - worse w movement and activity w/o associated numbness/weakness in ext, incontinence, unchanged w current sx.  No cp, palpitations, sob, dizziness. + gen weakness when he saw lg blood/clots this am.  Pt seen by gu 12/28 - had plans for outpt imaging which he canceled today to come to ed.

## 2020-12-31 NOTE — ED PROVIDER NOTE - NSFOLLOWUPINSTRUCTIONS_ED_ALL_ED_FT
Hematuria   Chest pain    Please follow up with Dr Gomez.  Return for increased bloody urine/clots, inability to urinate, fever, flank pain, vomiting, dizziness, exertional shortness of breath, worsening chest pain, palpitations, any other concerns.    Follow up with Dr Sullivan for your chest pain.  Return for increased pain, difficulty breathing, palpitations, any other concerns.       Hematuria    WHAT YOU NEED TO KNOW:    Hematuria is blood in your urine. Your urine may be bright red to dark brown.    DISCHARGE INSTRUCTIONS:    Return to the emergency department if:   •You have blood in your urine after a new injury, such as a fall.      •You have severe back or side pain that does not go away with treatment.      Call your doctor if:   •You are urinating very small amounts or not at all.      •You feel like you cannot empty your bladder.      •You have a fever that gets worse or does not go away with treatment.      •You cannot keep liquids or medicines down.      •Your urine gets darker, even after you drink extra liquids.      •You have questions or concerns about your condition, treatment, or care.      Drink liquids as directed: You may need to drink extra liquids to help flush the blood from your body through your urine. Water is the best liquid to drink. Ask how much liquid to drink each day and which liquids are best for you.  -----  Chest Pain    Chest pain can be caused by many different conditions which may or may not be dangerous. Causes include heartburn, lung infections, heart attack, blood clot in lungs, skin infections, strain or damage to muscle, cartilage, or bones, etc. In addition to a history and physical examination, an electrocardiogram (ECG) or other lab tests may have been performed to determine the cause of your chest pain. Follow up with your primary care provider or with a cardiologist as instructed.     SEEK IMMEDIATE MEDICAL CARE IF YOU HAVE ANY OF THE FOLLOWING SYMPTOMS: worsening chest pain, coughing up blood, unexplained back/neck/jaw pain, severe abdominal pain, dizziness or lightheadedness, fainting, shortness of breath, sweaty or clammy skin, vomiting, or racing heart beat. These symptoms may represent a serious problem that is an emergency. Do not wait to see if the symptoms will go away. Get medical help right away. Call 911 and do not drive yourself to the hospital.

## 2020-12-31 NOTE — ED PROVIDER NOTE - PROGRESS NOTE DETAILS
ADAM consulted and will eval. Pt c/o L side cp that started after walking back from the bathroom - reports he's had similar many times in the past due to his heart and it self resolves.  No cp now.  EKG w/o sig change from 2/20 ekg.  Pt reports cath at [facility] 12/17 - report reviewed - + pulm htn, nl coronary arteries.  No cough, uri sx, fever, le pain/swelling (no edema or ttp on le exam), h/o dvt/pe.  I am not concerned for acs, pe or pna as cause of pt's brief cp and do not feel it needs further eval today.  Pt cont to await gu.  in ed and plan to irrigate pt.  Await dispo from gu.  Repeat cbc w minimal drop in hgb, vss.  BP improving after pt's home meds.  in ed and irrigated pt.  Cleared for outpt fu w Dr Gomez. Repeat cbc w minimal drop in hgb, vss.  BP improving after pt's home meds.

## 2020-12-31 NOTE — ED PROVIDER NOTE - PATIENT PORTAL LINK FT
You can access the FollowMyHealth Patient Portal offered by [facility] by registering at the following website: [email or web address]. By joining [employer]’s FollowMyHealth portal, you will also be able to view your health information using other applications (apps) compatible with our system.

## 2020-12-31 NOTE — ED PROVIDER NOTE - CARE PROVIDER_API CALL
Eddie Gomez)  Urology  [address] [place] [address]  Phone: [phone]  Fax: [phone]  Follow Up Time:     Rivas Sullivan  CARDIOVASCULAR DISEASE  [address] [place] [address]  Phone: [phone]  Fax: [phone]  Follow Up Time:

## 2020-12-31 NOTE — ED PROVIDER NOTE - CLINICAL SUMMARY MEDICAL DECISION MAKING FREE TEXT BOX
Pt c/o hematuria increased x 2 d w/o associated pain, dysuria, retention.  Suspect hematuria 2/2 pt's prostate ca, less likely uti, stone.  VSS.  Plan labs, gu consult.

## 2020-12-31 NOTE — ED ADULT NURSE NOTE - OBJECTIVE STATEMENT
75 year old M patient, A+OX3, ambulatory w steady gait presents to ED w bloody urine.  Hx of prostate ca.  On chemo.  Pt states he has been bloody urine x2weeks intermittently but last night he had clots in the urine and he woke up saturated in blood during the night.  Denies burning on urination, states he does deal w urinary incontinence since prostate surgery.  Denies difficulty initiating urine.  Denies fecal incontience.  States he has mets to the back but the last two weeks increasing pain to the L side of the back.  NO distress noted.  Also c/o of generalized weakness.

## 2020-12-31 NOTE — ED PROVIDER NOTE - CARE PROVIDERS DIRECT ADDRESSES
[email or web address]

## 2020-12-31 NOTE — CONSULT NOTE ADULT - PROBLEM SELECTOR RECOMMENDATION 9
-stable  -#20fr 6 eye catheter placed with 150 cc light pink UO (pt had recently voided). Bladder was irrigated with minimal small clots till clear.  -can d/c patient home without dupree   -f/u Dr. Gomez  -to reschedule imaging (CT urogram and CT chest as outpt).  -continue present care

## 2020-12-31 NOTE — ED PROVIDER NOTE - PMH
Benign prostatic hyperplasia, presence of lower urinary tract symptoms unspecified, unspecified morphology    Bronchitis    Cardiomyopathy    Congestive heart failure, unspecified congestive heart failure chronicity, unspecified congestive heart failure type    HTN (hypertension)    Metastasis    Paroxysmal atrial fibrillation    Prostate cancer    Shortness of breath

## 2020-12-31 NOTE — CONSULT NOTE ADULT - SUBJECTIVE AND OBJECTIVE BOX
HPI:  76 yo male h/o metastatic prostate ca (spine, lung), bph, paroxysmal afib, chf, cardiomyopathy, aaa s/p repair, htn c/o hematuria.  Pt notes h/o same x several wks, worse since last pm.  Pt takes asa.  No fever, dysuria, change in pt's retention type sx (voiding but feels he doesn't empty completely and states this is his baseline), abd/pelvic pain.  Pt notes L low back pain x 1 yr - worse w movement and activity w/o associated numbness/weakness in ext, incontinence, unchanged w current sx.  No cp, palpitations, sob, dizziness. + gen weakness when he saw lg blood/clots this am.  Pt seen by gu  - had plans for outpt imaging which he canceled today to come to ed.    : above noted. Known patient to our service. H/O castrate resistant met prostate Ca, s/p ADT and EBRT, currently on enzalutamide and degarelix. Presents c/o hematuria and passing clots worsening. No dysuria. No fever/chills. No abdominal pain. Voiding ok. Last seen by  on  and scheduled for outpt imaging.     PAST MEDICAL & SURGICAL HISTORY:  Cardiomyopathy    HTN (hypertension)    Shortness of breath    Bronchitis    Paroxysmal atrial fibrillation    Congestive heart failure, unspecified congestive heart failure chronicity, unspecified congestive heart failure type    Metastasis    Prostate cancer    Benign prostatic hyperplasia, presence of lower urinary tract symptoms unspecified, unspecified morphology    Collar bone fracture        MEDICATIONS  (STANDING):  metoprolol succinate ER 25 milliGRAM(s) Oral daily  spironolactone 25 milliGRAM(s) Oral daily    MEDICATIONS  (PRN):      Allergies    Lupron (Short breath)    Intolerances        SOCIAL HISTORY:    FAMILY HISTORY:  Family history of cancer in sister (Sibling)    Family history of diabetes mellitus (Mother)        Vital Signs Last 24 Hrs  T(C): 36.7 (31 Dec 2020 15:18), Max: 36.8 (31 Dec 2020 14:06)  T(F): 98 (31 Dec 2020 15:18), Max: 98.2 (31 Dec 2020 14:06)  HR: 85 (31 Dec 2020 15:18) (68 - 100)  BP: 150/91 (31 Dec 2020 15:18) (150/91 - 183/114)  BP(mean): --  RR: 16 (31 Dec 2020 15:18) (16 - 18)  SpO2: 96% (31 Dec 2020 15:18) (96% - 98%)    On PE:  General: alert and awake  Abdomen: soft, NT, ND  : no SP discomfort, bilat testes descended, NT  EXT: no edema    LABS:                        10.6   4.69  )-----------( 146      ( 31 Dec 2020 14:08 )             33.1         137  |  103  |  16  ----------------------------<  117<H>  4.3   |  22  |  1.33<H>    Ca    9.6      31 Dec 2020 11:48    TPro  7.5  /  Alb  4.1  /  TBili  0.8  /  DBili  x   /  AST  18  /  ALT  10  /  AlkPhos  147<H>  12    PT/INR - ( 31 Dec 2020 11:48 )   PT: 14.6 sec;   INR: 1.23          PTT - ( 31 Dec 2020 11:48 )  PTT:32.4 sec  Urinalysis Basic - ( 31 Dec 2020 11:38 )    Color: Red / Appearance: SL Cloudy / S.020 / pH: x  Gluc: x / Ketone: NEGATIVE  / Bili: Negative / Urobili: 0.2 E.U./dL   Blood: x / Protein: 100 mg/dL / Nitrite: NEGATIVE   Leuk Esterase: NEGATIVE / RBC: Many /HPF / WBC < 5 /HPF   Sq Epi: x / Non Sq Epi: 0-5 /HPF / Bacteria: None /HPF        RADIOLOGY & ADDITIONAL STUDIES:

## 2020-12-31 NOTE — ED PROVIDER NOTE - CARE PLAN
Principal Discharge DX:	Hematuria   Principal Discharge DX:	Hematuria  Secondary Diagnosis:	Chest pain

## 2021-01-01 LAB
CULTURE RESULTS: SIGNIFICANT CHANGE UP
SPECIMEN SOURCE: SIGNIFICANT CHANGE UP

## 2021-01-04 ENCOUNTER — APPOINTMENT (OUTPATIENT)
Dept: UROLOGY | Facility: CLINIC | Age: 76
End: 2021-01-04

## 2021-01-08 ENCOUNTER — NON-APPOINTMENT (OUTPATIENT)
Age: 76
End: 2021-01-08

## 2021-01-09 PROBLEM — I10 ESSENTIAL (PRIMARY) HYPERTENSION: Chronic | Status: ACTIVE | Noted: 2020-12-31

## 2021-01-09 PROBLEM — I42.9 CARDIOMYOPATHY, UNSPECIFIED: Chronic | Status: ACTIVE | Noted: 2020-12-31

## 2021-01-13 ENCOUNTER — APPOINTMENT (OUTPATIENT)
Dept: UROLOGY | Facility: CLINIC | Age: 76
End: 2021-01-13

## 2021-01-25 ENCOUNTER — APPOINTMENT (OUTPATIENT)
Dept: HEART AND VASCULAR | Facility: CLINIC | Age: 76
End: 2021-01-25

## 2021-02-08 ENCOUNTER — APPOINTMENT (OUTPATIENT)
Dept: UROLOGY | Facility: CLINIC | Age: 76
End: 2021-02-08
Payer: MEDICARE

## 2021-02-08 VITALS — DIASTOLIC BLOOD PRESSURE: 70 MMHG | SYSTOLIC BLOOD PRESSURE: 124 MMHG | HEART RATE: 55 BPM | TEMPERATURE: 97.2 F

## 2021-02-08 DIAGNOSIS — C61 MALIGNANT NEOPLASM OF PROSTATE: ICD-10-CM

## 2021-02-08 PROCEDURE — 99072 ADDL SUPL MATRL&STAF TM PHE: CPT

## 2021-02-08 PROCEDURE — 52001 CYSTO W/IRRG&EVAC MLT CLOTS: CPT

## 2021-02-09 ENCOUNTER — NON-APPOINTMENT (OUTPATIENT)
Age: 76
End: 2021-02-09

## 2021-02-19 ENCOUNTER — APPOINTMENT (OUTPATIENT)
Dept: HEART AND VASCULAR | Facility: CLINIC | Age: 76
End: 2021-02-19
Payer: MEDICARE

## 2021-02-19 VITALS
SYSTOLIC BLOOD PRESSURE: 126 MMHG | DIASTOLIC BLOOD PRESSURE: 80 MMHG | HEART RATE: 90 BPM | OXYGEN SATURATION: 96 % | TEMPERATURE: 98 F | RESPIRATION RATE: 12 BRPM

## 2021-02-19 VITALS — WEIGHT: 165 LBS | BODY MASS INDEX: 21.17 KG/M2 | TEMPERATURE: 98 F | RESPIRATION RATE: 12 BRPM | HEIGHT: 74 IN

## 2021-02-19 DIAGNOSIS — R53.83 OTHER FATIGUE: ICD-10-CM

## 2021-02-19 DIAGNOSIS — I48.91 UNSPECIFIED ATRIAL FIBRILLATION: ICD-10-CM

## 2021-02-19 DIAGNOSIS — I42.9 CARDIOMYOPATHY, UNSPECIFIED: ICD-10-CM

## 2021-02-19 DIAGNOSIS — R06.00 DYSPNEA, UNSPECIFIED: ICD-10-CM

## 2021-02-19 DIAGNOSIS — I50.9 HEART FAILURE, UNSPECIFIED: ICD-10-CM

## 2021-02-19 PROCEDURE — 36415 COLL VENOUS BLD VENIPUNCTURE: CPT

## 2021-02-19 PROCEDURE — 99072 ADDL SUPL MATRL&STAF TM PHE: CPT

## 2021-02-19 PROCEDURE — 99214 OFFICE O/P EST MOD 30 MIN: CPT

## 2021-02-19 PROCEDURE — 93000 ELECTROCARDIOGRAM COMPLETE: CPT

## 2021-02-20 LAB
BASOPHILS # BLD AUTO: 0.02 K/UL
BASOPHILS NFR BLD AUTO: 0.4 %
EOSINOPHIL # BLD AUTO: 0.11 K/UL
EOSINOPHIL NFR BLD AUTO: 2 %
HCT VFR BLD CALC: 33.5 %
HGB BLD-MCNC: 10.3 G/DL
IMM GRANULOCYTES NFR BLD AUTO: 0.6 %
LYMPHOCYTES # BLD AUTO: 0.52 K/UL
LYMPHOCYTES NFR BLD AUTO: 9.7 %
MAN DIFF?: NORMAL
MCHC RBC-ENTMCNC: 30.7 GM/DL
MCHC RBC-ENTMCNC: 30.8 PG
MCV RBC AUTO: 100.3 FL
MONOCYTES # BLD AUTO: 0.3 K/UL
MONOCYTES NFR BLD AUTO: 5.6 %
NEUTROPHILS # BLD AUTO: 4.39 K/UL
NEUTROPHILS NFR BLD AUTO: 81.7 %
NT-PROBNP SERPL-MCNC: ABNORMAL PG/ML
PLATELET # BLD AUTO: 242 K/UL
RBC # BLD: 3.34 M/UL
RBC # FLD: 15 %
WBC # FLD AUTO: 5.37 K/UL

## 2021-02-21 PROBLEM — I42.9 CARDIOMYOPATHY: Status: ACTIVE | Noted: 2019-01-11

## 2021-02-21 PROBLEM — I48.91 ATRIAL FIBRILLATION: Status: ACTIVE | Noted: 2018-02-28

## 2021-02-21 PROBLEM — R06.00 DYSPNEA: Status: ACTIVE | Noted: 2018-10-22

## 2021-02-21 RX ORDER — ISOSORBIDE DINITRATE 20 MG/1
20 TABLET ORAL
Qty: 180 | Refills: 1 | Status: ACTIVE | COMMUNITY
Start: 2021-02-21

## 2021-02-21 RX ORDER — FUROSEMIDE 20 MG/1
20 TABLET ORAL DAILY
Qty: 90 | Refills: 1 | Status: ACTIVE | COMMUNITY
Start: 2021-02-21

## 2021-02-21 RX ORDER — HYDRALAZINE HYDROCHLORIDE 10 MG/1
10 TABLET ORAL
Qty: 180 | Refills: 1 | Status: ACTIVE | COMMUNITY
Start: 2021-02-21

## 2021-02-21 NOTE — DISCUSSION/SUMMARY
[Atrial Fibrillation] : atrial fibrillation [Rate Control] : rate control [Systolic Heart Failure] : systolic congestive heart failure [Stable] : stable [Sodium Restriction] : sodium restriction [___ Month(s)] : [unfilled] month(s) [With Me] : with me [FreeTextEntry1] : venipuncture with BMP , CBC \par \par medications reconciled\par \par High stroke risk  but bleeding risk  is high , therefore,  continue aspirin 81mg daily \par \par advised covid vaccine \par \par melatonin 5mg hs for sleep aid \par \par \par

## 2021-02-21 NOTE — HISTORY OF PRESENT ILLNESS
STERI-STRIPS (BUTTERFLY) POST SURGERY        Steri-Strips have been placed over your incision site to give added support  Please continue to bathe the area as usual     Eventually the Steri-Strips will begin to peel off on the ends  Snip the raised ends off with scissors and let the rest fall off on their own  If you have any questions, please call our office   [phone]
[FreeTextEntry1] : s/p recent hospitalization for CHF  exacerbation to [facility] . He was also in urinary retention  secondary to obstructive clot while on Eliquis.  He was also falling and decision was made to stop Eliquis \par \par Marked orthopnea persists  and he stopped prescribed mixiletine believing it  prohibited sleep \par \par Hematuria has since resolved \par \par Reports that no transfusions were necessary while hospitalized\par \par His  Entresto was discontinued as well \par \par Now he presents in atrial fibrillation   with  bpm \par \par

## 2021-02-21 NOTE — ASSESSMENT
[FreeTextEntry1] : atrial fibrillation\par \par \par CHF\par \par metastatic prostate cancer \par \par insomnia \par \par

## 2021-02-21 NOTE — REASON FOR VISIT
[Follow-Up - From Hospitalization] : follow-up of a recent hospitalization for [Atrial Fibrillation] : atrial fibrillation [Cardiomyopathy] : cardiomyopathy [Dyspnea] : dyspnea [Fatigue] : feeling tired (fatigue) [Heart Failure] : congestive heart failure [Palpitations] : palpitations [FreeTextEntry1] : 75  year old AA  male with metastatic prostate cancer and hx of chronic hypertensive heart disease is s/p ascending aortic aneurysm repair  with   LVEF ~40-50% %    clear indication for ICD is lacking as QRS is narrow. \par \par  He never participated in  patient cardiac rehab post cardiothoracic surgery. \par \par He denies chest pains,  orthopnea  but feels weak all the time

## 2021-02-21 NOTE — REVIEW OF SYSTEMS
[Feeling Fatigued] : feeling fatigued [Recent Weight Loss (___ Lbs)] : recent [unfilled] ~Ulb weight loss [Dyspnea on exertion] : dyspnea during exertion [Palpitations] : palpitations [Limb Weakness (Paresis)] : limb weakness [Anxiety] : anxiety [Dizziness] : dizziness [Negative] : Heme/Lymph [FreeTextEntry1] : orthopnea, insomnia

## 2021-02-21 NOTE — PHYSICAL EXAM
[General Appearance - Well Developed] : well developed [Normal Appearance] : normal appearance [Well Groomed] : well groomed [General Appearance - Well Nourished] : well nourished [No Deformities] : no deformities [General Appearance - In No Acute Distress] : no acute distress [Normal Conjunctiva] : the conjunctiva exhibited no abnormalities [Eyelids - No Xanthelasma] : the eyelids demonstrated no xanthelasmas [Normal Jugular Venous A Waves Present] : normal jugular venous A waves present [Normal Jugular Venous V Waves Present] : normal jugular venous V waves present [No Jugular Venous Iglesias A Waves] : no jugular venous iglesias A waves [Respiration, Rhythm And Depth] : normal respiratory rhythm and effort [Exaggerated Use Of Accessory Muscles For Inspiration] : no accessory muscle use [Auscultation Breath Sounds / Voice Sounds] : lungs were clear to auscultation bilaterally [Heart Sounds] : normal S1 and S2 [Murmurs] : no murmurs present [Arterial Pulses Normal] : the arterial pulses were normal [Edema] : no peripheral edema present [Bowel Sounds] : normal bowel sounds [Abdomen Soft] : soft [Abdomen Tenderness] : non-tender [Abdomen Mass (___ Cm)] : no abdominal mass palpated [Abnormal Walk] : normal gait [Gait - Sufficient For Exercise Testing] : the gait was sufficient for exercise testing [Cyanosis, Localized] : no localized cyanosis [Nail Clubbing] : no clubbing of the fingernails [Petechial Hemorrhages (___cm)] : no petechial hemorrhages [Skin Color & Pigmentation] : normal skin color and pigmentation [] : no rash [No Venous Stasis] : no venous stasis [Skin Lesions] : no skin lesions [No Skin Ulcers] : no skin ulcer [No Xanthoma] : no  xanthoma was observed [FreeTextEntry1] : decreased skin turgor [Oriented To Time, Place, And Person] : oriented to person, place, and time [Affect] : the affect was normal [Mood] : the mood was normal

## 2021-02-23 ENCOUNTER — APPOINTMENT (OUTPATIENT)
Dept: INTERNAL MEDICINE | Facility: CLINIC | Age: 76
End: 2021-02-23
Payer: MEDICARE

## 2021-02-23 DIAGNOSIS — N18.9 CHRONIC KIDNEY DISEASE, UNSPECIFIED: ICD-10-CM

## 2021-02-23 DIAGNOSIS — R31.0 GROSS HEMATURIA: ICD-10-CM

## 2021-02-23 DIAGNOSIS — R06.02 SHORTNESS OF BREATH: ICD-10-CM

## 2021-02-23 PROCEDURE — 99215 OFFICE O/P EST HI 40 MIN: CPT | Mod: 95

## 2021-02-26 ENCOUNTER — APPOINTMENT (OUTPATIENT)
Dept: INTERNAL MEDICINE | Facility: CLINIC | Age: 76
End: 2021-02-26

## 2021-03-23 ENCOUNTER — APPOINTMENT (OUTPATIENT)
Dept: HEART AND VASCULAR | Facility: CLINIC | Age: 76
End: 2021-03-23

## 2021-04-02 ENCOUNTER — NON-APPOINTMENT (OUTPATIENT)
Age: 76
End: 2021-04-02

## 2021-04-16 ENCOUNTER — NON-APPOINTMENT (OUTPATIENT)
Age: 76
End: 2021-04-16

## 2021-06-10 ENCOUNTER — APPOINTMENT (OUTPATIENT)
Dept: INTERNAL MEDICINE | Facility: CLINIC | Age: 76
End: 2021-06-10
Payer: MEDICAID

## 2021-06-10 DIAGNOSIS — C61 MALIGNANT NEOPLASM OF PROSTATE: ICD-10-CM

## 2021-06-10 DIAGNOSIS — C79.51 MALIGNANT NEOPLASM OF PROSTATE: ICD-10-CM

## 2021-06-10 PROCEDURE — 99213 OFFICE O/P EST LOW 20 MIN: CPT | Mod: 95

## 2021-07-07 RX ORDER — TAMSULOSIN HYDROCHLORIDE 0.4 MG/1
0.4 CAPSULE ORAL
Qty: 60 | Refills: 3 | Status: ACTIVE | COMMUNITY
Start: 2018-10-19 | End: 1900-01-01

## 2021-08-03 NOTE — ED PROVIDER NOTE - NSCAREINITIATED _GEN_ER
Director, Ambika(Attending)

## 2021-12-19 NOTE — PATIENT PROFILE ADULT - FALL HARM RISK TYPE OF ASSESSMENT
[facility]    Progress Note    Froilan Cutler Patient Status:  Inpatient    10/1/1990 MRN [id number]   Location [facility] 2W/SW Attending Jeannette Cote, [address] [facility] [address] Day # 15 PCP No primary care provider on file.      Subjec pneumomediastinum. Increased gas around the right heart and inferior heart margins, which may represent redistribution of pre-existing gas. 3.  Stable/satisfactory position of lines and tubes.    Dictated by (CST): Tunde Monroy MD on 12/19/2021 at 9:12 AM (CPT=71045)    Result Date: 12/18/2021  CONCLUSION:  1. Extensive bilateral multifocal airspace disease and or superimposed ARDS. 2. Small bilateral apically pneumothoraces have diminished measuring less than 10%. 3. ET tube in satisfactory position.   Spring admission

## 2022-02-14 NOTE — ED PROVIDER NOTE - DISCHARGE DATE
"Subjective     Patient ID: Citlali Nicolas is a 63 y.o. female. Patient is here for management of multiple medical problems.     Chief Complaint   Patient presents with   • Hypothyroidism     History of Present Illness       Hypothyroid.        The following portions of the patient's history were reviewed and updated as appropriate: allergies, current medications, past family history, past medical history, past social history, past surgical history and problem list.    Review of Systems    Current Outpatient Medications:   •  B Complex-C (B-complex with vitamin C) tablet, Take 1 tablet by mouth Daily., Disp: 30 tablet, Rfl: 11  •  buPROPion XL (WELLBUTRIN XL) 300 MG 24 hr tablet, Take 300 mg by mouth Daily., Disp: , Rfl:   •  levothyroxine (SYNTHROID, LEVOTHROID) 137 MCG tablet, Take 1 tablet by mouth Daily., Disp: 90 tablet, Rfl: 3    Objective      Blood pressure 132/86, pulse 80, temperature 97.7 °F (36.5 °C), resp. rate 16, height 161.3 cm (63.5\"), weight 86.6 kg (191 lb), SpO2 98 %.    Physical Exam     General Appearance:    Alert, cooperative, no distress, appears stated age   Head:    Normocephalic, without obvious abnormality, atraumatic   Eyes:    PERRL, conjunctiva/corneas clear, EOM's intact   Ears:    Normal TM's and external ear canals, both ears   Nose:   Nares normal, septum midline, mucosa normal, no drainage   or sinus tenderness   Throat:   Lips, mucosa, and tongue normal; teeth and gums normal   Neck:   Supple, symmetrical, trachea midline, no adenopathy;        thyroid:  No enlargement/tenderness/nodules; no carotid    bruit or JVD   Back:     Symmetric, no curvature, ROM normal, no CVA tenderness   Lungs:     Clear to auscultation bilaterally, respirations unlabored   Chest wall:    No tenderness or deformity   Heart:    Regular rate and rhythm, S1 and S2 normal, no murmur,        rub or gallop   Abdomen:     Soft, non-tender, bowel sounds active all four quadrants,     no masses, no " organomegaly   Extremities:   Extremities normal, atraumatic, no cyanosis or edema   Pulses:   2+ and symmetric all extremities   Skin:   Skin color, texture, turgor normal, no rashes or lesions   Lymph nodes:   Cervical, supraclavicular, and axillary nodes normal   Neurologic:   CNII-XII intact. Normal strength, sensation and reflexes       throughout      Results for orders placed or performed in visit on 11/23/21   Tissue Pathology Exam    Specimen: 1: Large Intestine, Cecum; Tissue    2: Colon; Tissue   Result Value Ref Range    Case Report       Surgical Pathology Report                         Case: [id number]                                  Authorizing Provider:  Popeye Lamar MD    Collected:           11/23/2021 01:38 PM          Ordering Location:     [facility]   Received:            11/24/2021 11:23 AM                                 LABORATORY                                                                   Pathologist:           Krishan Conley MD                                                            Specimens:   1) - Large Intestine, Cecum                                                                         2) - Colon                                                                                 Clinical Information       Screening colonoscopy, family history of malignant neoplasm of digestive organs, polyp of colon, but benign lipomatous neoplasm of intra-abdominal organs, hemorrhoids      Final Diagnosis       1. CECUM, POLYPECTOMIES:   Tubular adenomas.   Negative for high grade dysplasia or malignancy.  2. COLON, SPLENIC FLEXURE, POLYPECTOMY:   Inflammatory polyp.   Negative for dysplasia or malignancy.  GJK      Gross Description          Specimen 1 received in formalin labeled cecum polyps are at least 3 tan polyps averaging 0.3 x 0.3 x 0.3 cm submitted entirely in a single cassette.       Specimen 2 received in formalin labeled splenic flexure polyp are 2 pieces  of tan irregular soft tissue aggregating 0.6 x 0.5 x 0.4 cm submitted entirely in a single cassette.  HM      Microscopic Description       The slides are reviewed and demonstrate histopathologic features supporting the above rendered diagnosis.             Assessment/Plan   Suture with abnormalities.    Pt will go back to Dr Evans.      Diagnoses and all orders for this visit:    1. Acquired hypothyroidism (Primary)  -     Comprehensive Metabolic Panel  -     CBC & Differential  -     TSH  -     T4, Free    2. Elevated liver enzymes  -     Comprehensive Metabolic Panel  -     CBC & Differential  -     TSH  -     T4, Free    Other orders  -     levothyroxine (SYNTHROID, LEVOTHROID) 137 MCG tablet; Take 1 tablet by mouth Daily.  Dispense: 90 tablet; Refill: 3      Return in about 6 months (around 8/14/2022).          There are no Patient Instructions on file for this visit.     Tariq Brown MD    Assessment/Plan        31-Dec-2020

## 2022-04-11 PROBLEM — I77.819 AORTIC ECTASIA: Status: ACTIVE | Noted: 2017-04-03

## 2022-04-15 NOTE — PATIENT PROFILE ADULT. - NS PRO OT REFERRAL QUES 2 YN
A/P    63 y/o M with history of CAD, s/p multiple PCI, with most recent 2/22 PCI of LAD in setting of NSTEMI, on ASA only (pradaxa), chronic atrial fibrillation maintained on Pradaxa, essential HTN, type 2 DM previously on oral medications but on insulin, diabetic neuropathy with chronic RIGHT LE venous stasis changes>left, LEFT foot ulcer seen by podiatry, past endarterectomy LEFT CEA in 2014 presenting with 1 week of decreased urine output, cystitis with hematuria     #ANT on CKD, new HD  -oliguric renal failure in setting of ? UTI/cystitis, r/o obstruction ? secondary to abx   -worsened with diuretic use but unlikely due to hypovolemia alone from diuretic use   -hyperkalemia improved   -New HD for uremia, renal failure-   sp RIJ shiley placed on 4/12 in IR.   -renal f/u -sp rrt 4/12 for uncontrolled bleeding sp  right groin shiley removal  - monitor h/h - stable   IR following planning  tunnelled HD catheter placement.  on Mon 4/18. 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   -ABG noted  -med f/u   -MICU eval noted 4/5  -head ct ok    #Acute on chronic HFpEF  -remains overloaded but improved  -continue aggressive volume removal with HD  -now tolerating po - c/w  bb    #Chronic AF  -rates improved sp cardizem gtt    -c/w metoprolol succ 100 mg qd  -c/w dilt cd 120mg daily    -c.w hep gtt     #HTN  -tolerating PO--- c/w meds   -increase hydral if bp remains elevated     #CAD, s/p PCI, most recent 2/2022  -stable, cont ASA, off plavix due to a/c indication  -statin     #R carotid stenosis  -cont med tx  -MRA noted with Greater than 75% stenosis of the right distal common carotid artery. Approximately 60% stenosis of the proximal left internal carotid artery.  -Continue ASA and Statin Therapy  -vasc outpt f/u Dr Pinto    ACP- Advanced Care Planning  -Advanced care planning discussed with patient. Advanced care planning forms discussed with patient and/or family.  Risks, benefits, and alternatives of medical/cardiac procedures were discussed in detail with all questions answered.  30 minutes were spent addressing advance care planning.         no

## 2022-04-26 NOTE — ED PROVIDER NOTE - NSDCPRINTRESULTS_ED_ALL_ED
"Patient reports her biggest complaint is not sleeping due to noise from her neighbors upstairs. She has had a headache on Sundays the past three weeks, but then fine during the week. She does have a headache today however, and ibuprofen didn't help much. She rates the pain 6/10 and describes it as \"burning\" pain on the top of her head near her shunt. She also notes some dizziness that accompany the headache. No vision changes. She said she has bumped her head a few times the last couple of weeks getting in and out of the car. Nothing else notable that she could describe.     Pt's last MRI was 12/28/21 (3 month postop) and was stable. Due for another MRI end of this year. Last visit with Me[facility] for headaches was in April 2021.     Dr. Pinzon reviewed, CT head ordered to evaluate shunt, and if stable, recommend follow up with Neurology for headaches.    Message routed to scheduling team to assist patient with setting up CT.    " Patient requests all Lab and Radiology Results on their Discharge Instructions

## 2022-05-26 NOTE — ED ADULT TRIAGE NOTE - TEMPERATURE IN FAHRENHEIT (DEGREES F)
Klaudia De La Paz (:  1958) is a 70 y.o. female,Established patient, here for evaluation of the following chief complaint(s):  Fall (Fall on . Left knee and ankle pain. Tingling in the toes since the fall. Takes Norco with some relief. )         ASSESSMENT/PLAN:  1. Acute pain of left knee  -     Florinda Done   2. Acute left ankle pain  -     Florinda Done Dr  1. L knee pain suspect meniscal tear. Advised to stay off knee. Will refer to ortho for evaluation. Will probably need MRI. We will let Ortho decide. 2. L ankle pain. Suspect strain will let ortho do xrays. Return if symptoms worsen or fail to improve. Subjective   SUBJECTIVE/OBJECTIVE:  75-year-old female patient of Dr. Franci Gutierrez who comes in because of left knee and ankle pain after a fall on 22. While coming down stairs. Burning in left ankle. Also with burning in knee. Review of Systems       Objective   Physical Exam  Constitutional:       Appearance: Normal appearance. HENT:      Head: Normocephalic. Musculoskeletal:      Comments: Left knee: tenderness over media meniscus. No swelling. Some tenderness over medial collateral ligament. L ankle tenderness over lateral malleolus. Neurological:      Mental Status: She is alert. An electronic signature was used to authenticate this note.     --Payam Garza MD .
98

## 2022-08-18 ENCOUNTER — NON-APPOINTMENT (OUTPATIENT)
Age: 77
End: 2022-08-18

## 2023-11-07 NOTE — PATIENT PROFILE ADULT. - SPIRITUAL CULTURAL, RELIGIOUS PRACTICES/VALUES, PROFILE
[facility] Counseling:  I discussed with the patient the risks of ustekinumab including but not limited to immunosuppression, malignancy, posterior leukoencephalopathy syndrome, and serious infections.  The patient understands that monitoring is required including a PPD at baseline and must alert us or the primary physician if symptoms of infection or other concerning signs are noted.

## 2024-08-29 NOTE — ED PROVIDER NOTE - OBJECTIVE STATEMENT
Medicare Annual Wellness Visit    Samina HECK Derick is here for Medicare AWV    Assessment & Plan   Medicare annual wellness visit, subsequent  Recommendations for Preventive Services Due: see orders and patient instructions/AVS.  Recommended screening schedule for the next 5-10 years is provided to the patient in written form: see Patient Instructions/AVS.     Return in 1 year (on 8/29/2025) for Medicare AWV.     Subjective       Patient's complete Health Risk Assessment and screening values have been reviewed and are found in Flowsheets. The following problems were reviewed today and where indicated follow up appointments were made and/or referrals ordered.    Positive Risk Factor Screenings with Interventions:            Controlled Medication Review:    Today's Pain Level: No data recorded   Opioid Risk: (Low risk score <55) Opioid risk score: 47    Patient is low risk for opioid use disorder or overdose.    Last PDMP Norberto as Reviewed:  Review User Review Instant Review Result   ROME GREENE 8/15/2024 12:14 PM     Reviewed PDMP [1]     Last Controlled Substance Monitoring Documentation      Flowsheet Row Refill from 8/15/2024 in [facility]   Periodic Controlled Substance Monitoring No signs of potential drug abuse or diversion identified. filed at 08/15/2024 1214               Inactivity:  On average, how many days per week do you engage in moderate to strenuous exercise (like a brisk walk)?: 0 days (patient has bad back -nurse let patient know she can do leg lifts and arm stretches) (!) Abnormal  On average, how many minutes do you engage in exercise at this level?: 0 min  Interventions:  See AVS for additional education material     Abnormal BMI (obese):  There is no height or weight on file to calculate BMI. 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  Samina HECK Derick, was evaluated through a synchronous (real-time) audio-video encounter. The patient (or guardian if applicable) is aware that this is a billable service, which includes applicable co-pays. This Virtual Visit was conducted with patient's (and/or legal guardian's) consent. Patient identification was verified, and a caregiver was present when appropriate.   The patient was located at Home: [address] [id number]  Provider was located at Facility (Appt Dept): [address] [place] [id number]  Confirm you are appropriately licensed, registered, or certified to deliver care in the [facility] where the patient is located as indicated above. If you are not or unsure, please re-schedule the visit: Yes, I confirm.      This encounter was performed under my, Buck Tavares MD’s, direct supervision, 8/29/2024.    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 Denies dysuria or flank pain. Pt Is also endorsing severe lightheadedness upon standing and exertional SOB with minor exertion (walking across room, getting himself out of a cab). No CP or palpitations. No n/v/c/d or abd pain.

## 2025-01-31 NOTE — HISTORY OF PRESENT ILLNESS
Patient has acute on chronic back pain  He received ketamin injection   [FreeTextEntry1] : 73 year old male with PVCs s/p unsuccessful ablation at OSH , history of AAA s/p repair 2017, HTN, cardiomyopathy and metastatic prostate cancer, who underwent a EP study with ablation of inferior basal RV PVC, who presents for followup.\par \par As per notes he has had an EF as low as 30% and as per note from [facility] he was recommended to have an ICD but deferred this. Most recent echo with EF 40%.  He states that he had prostate cancer and was in remission but it came back and has metastasis to the spine.  He believes his cardiomyopathy was secondary to the prior medication he was on (lupron) and was told by his oncologist this was unlikely.  He states he had one episode  of syncope right before his open heart surgery.  He states that recently he was feeling lightheaded and had an episode where he tripped.  He states he stopped taking his torsemide (now only takes PRN). \par \par He underwent a PVC ablation (inferior basal RV PVC) 6/2019.  He states that he felt great for a few days after discharge; but then started to experience the lightheadedness he had before.  He states it was slightly better then what he experience; but still there and interfering with his daily life.  He states it has stopped him from leaving his house.  One time he did and he had a near syncopal episode.  He reduced his MEtoprolol and felt a slight improvement in the way he felt.  EKG showed recurrent PVCs and he went for an echocardiogram which showed  EF 45% and no effusion.  He was started on Mexiletine and presents for follow up.  He notes an improvement in the way he feels.  Less lightheadedness.  No palpitations, syncope, chest pain. \par    \par